# Patient Record
Sex: FEMALE | Race: WHITE | Employment: OTHER
[De-identification: names, ages, dates, MRNs, and addresses within clinical notes are randomized per-mention and may not be internally consistent; named-entity substitution may affect disease eponyms.]

---

## 2017-01-23 RX ORDER — SODIUM CHLORIDE 0.9 % (FLUSH) 0.9 %
5-10 SYRINGE (ML) INJECTION AS NEEDED
Status: CANCELLED | OUTPATIENT
Start: 2017-01-24

## 2017-01-23 RX ORDER — DIAZEPAM 5 MG/1
10 TABLET ORAL ONCE
Status: CANCELLED | OUTPATIENT
Start: 2017-01-24 | End: 2017-01-24

## 2017-01-24 ENCOUNTER — SURGERY (OUTPATIENT)
Age: 77
End: 2017-01-24

## 2017-01-24 ENCOUNTER — HOSPITAL ENCOUNTER (OUTPATIENT)
Age: 77
Setting detail: OUTPATIENT SURGERY
Discharge: HOME | End: 2017-01-24
Attending: PHYSICAL MEDICINE & REHABILITATION | Admitting: PHYSICAL MEDICINE & REHABILITATION
Payer: MEDICARE

## 2017-01-24 ENCOUNTER — APPOINTMENT (OUTPATIENT)
Dept: GENERAL RADIOLOGY | Age: 77
End: 2017-01-24
Attending: PHYSICAL MEDICINE & REHABILITATION
Payer: MEDICARE

## 2017-01-24 VITALS
SYSTOLIC BLOOD PRESSURE: 111 MMHG | DIASTOLIC BLOOD PRESSURE: 58 MMHG | HEART RATE: 76 BPM | BODY MASS INDEX: 24.35 KG/M2 | RESPIRATION RATE: 14 BRPM | OXYGEN SATURATION: 98 % | TEMPERATURE: 98.1 F | WEIGHT: 124 LBS | HEIGHT: 60 IN

## 2017-01-24 LAB — GLUCOSE BLD STRIP.AUTO-MCNC: 112 MG/DL (ref 70–110)

## 2017-01-24 PROCEDURE — 82962 GLUCOSE BLOOD TEST: CPT

## 2017-01-24 PROCEDURE — 76010000009 HC PAIN MGT 0 TO 30 MIN PROC: Performed by: PHYSICAL MEDICINE & REHABILITATION

## 2017-01-24 PROCEDURE — 77030003665 HC NDL SPN BBMI -A: Performed by: PHYSICAL MEDICINE & REHABILITATION

## 2017-01-24 PROCEDURE — 74011250636 HC RX REV CODE- 250/636

## 2017-01-24 PROCEDURE — 74011000250 HC RX REV CODE- 250: Performed by: PHYSICAL MEDICINE & REHABILITATION

## 2017-01-24 PROCEDURE — 74011250636 HC RX REV CODE- 250/636: Performed by: PHYSICAL MEDICINE & REHABILITATION

## 2017-01-24 PROCEDURE — 74011250637 HC RX REV CODE- 250/637: Performed by: PHYSICAL MEDICINE & REHABILITATION

## 2017-01-24 RX ORDER — DIAZEPAM 5 MG/1
5 TABLET ORAL ONCE
Status: COMPLETED | OUTPATIENT
Start: 2017-01-24 | End: 2017-01-24

## 2017-01-24 RX ORDER — LIDOCAINE HYDROCHLORIDE 10 MG/ML
INJECTION, SOLUTION EPIDURAL; INFILTRATION; INTRACAUDAL; PERINEURAL AS NEEDED
Status: DISCONTINUED | OUTPATIENT
Start: 2017-01-24 | End: 2017-01-24 | Stop reason: HOSPADM

## 2017-01-24 RX ORDER — ROPIVACAINE HYDROCHLORIDE 2 MG/ML
INJECTION, SOLUTION EPIDURAL; INFILTRATION; PERINEURAL AS NEEDED
Status: DISCONTINUED | OUTPATIENT
Start: 2017-01-24 | End: 2017-01-24 | Stop reason: HOSPADM

## 2017-01-24 RX ORDER — SODIUM CHLORIDE 0.9 % (FLUSH) 0.9 %
5-10 SYRINGE (ML) INJECTION AS NEEDED
Status: DISCONTINUED | OUTPATIENT
Start: 2017-01-24 | End: 2017-01-24 | Stop reason: HOSPADM

## 2017-01-24 RX ORDER — DIAZEPAM 5 MG/1
10 TABLET ORAL ONCE
Status: DISCONTINUED | OUTPATIENT
Start: 2017-01-24 | End: 2017-01-24

## 2017-01-24 RX ADMIN — DIAZEPAM 5 MG: 5 TABLET ORAL at 10:24

## 2017-01-24 RX ADMIN — LIDOCAINE HYDROCHLORIDE 6 ML: 10 INJECTION, SOLUTION EPIDURAL; INFILTRATION; INTRACAUDAL; PERINEURAL at 11:15

## 2017-01-24 RX ADMIN — ROPIVACAINE HYDROCHLORIDE 6 ML: 2 INJECTION, SOLUTION EPIDURAL; INFILTRATION at 11:16

## 2017-01-24 NOTE — PROCEDURES
[facility]    DIAG LUMBAR FACET INJECTIONS  PROCEDURE REPORT      PATIENT:  Bhupendra Alan OF BIRTH:  1940  DATE OF SERVICE:  1/24/2017  [facility]    PRE-PROCEDURE DIAGNOSIS:  See Above    POST-PROCEDURE DIAGNOSIS:  See Above                PROCEDURE:  1. Bilateral diagnostic lumbar medial branch blocks via the L3/L4,  L4/L5,  L5/S1 medial branch nerves ( 78839, 50;  62055, 50;  40001, 50 )  2. Fluoroscopic needle guidance (35524)      LEVELS TREATED:  Bilateral sided L3/L4,  L4/L5,  L5/S1 medial branch nerves     ANESTHESIA:  See Medication Administration Record    COMPLICATIONS: None. PHYSICIAN:  Christy Eli MD    PRE-PROCEDURE NOTE:  Pre-procedural assessment of the patient was performed including a limited history and physical examination. The details of the procedure were discussed with the patient, including the risks, benefits and alternative options and an informed consent was obtained. The patients NPO status, if necessary for the specific procedure and/or administration of moderate intravenous sedation, if utilized, and availability of a responsible adult to escort the patient following the procedure were confirmed. PROCEDURE NOTE:  The patient was brought to the procedure suite and positioned on the fluoroscopy table in the prone position. Physiologic monitors were applied and supplemental oxygen was administered via nasal cannula. The skin was prepped in the standard surgical fashion and sterile drapes were applied over the procedure site. Please refer to the Flowsheet for documentation of the patients vital signs and the Medication Administration Record for any oral and/or intravenous sedation administered prior to or during the procedure. 1% Lidocaine was utilized for local anesthesia.  Under AP fluoroscopic guidance a 25-gauge, 3-1/2 inch short bevel spinal needle was advanced to the junction of the superior articular process and transverse process of each vertebral level immediately inferior to the above-mentioned dorsal rami medial branch nerves. A needle was also placed along the sacral ala to block the L5 medial branch nerve. After all needles were placed,  0.5 mL of 0.2% ropivacaine was injected at each location after the negative aspiration of blood, air or CSF. The needles were removed and the stilets were replaced. The procedure was performed on the contralateral side in the same fashion and at the same levels using the same volume of local anesthetic following negative aspiration of blood, air or CSF. The needles were removed intact. The area was thoroughly cleaned and sterile bandages applied as necessary. The patient tolerated the procedure well and vital signs remained stable throughout the procedure. The patient was assessed immediately following the procedure and was noted to have greater than 50% reduction in pain (a reduction from 7/10 to 3/10 in severity of lumbar pain). Based on these results, this procedure will be repeated to assess if the patient is an appropriate candidate for radiofrequency ablation of the above-mentioned medial branch nerves. Based on these results, the patient is considered an appropriate candidate for radiofrequency ablation of the above-mentioned medial branch nerves and will be scheduled for that procedure. The total levels successfully blocked were 3 levels, both sides. POST-PROCEDURE COURSE:  The patient was escorted from the procedure suite in satisfactory condition and recovered per facility protocol based on the type of procedure performed and/or the sedation utilized. The patient did not experience any adverse events and remained hemodynamically stable during the post-procedure period.       DISCHARGE NOTE:  Upon discharge, the patient was able to tolerate fluids and was in no acute distress. The patient was oriented to person, place and time and vital signs were stable. Appropriate post-procedure instructions were provided and explained to the patient in detail and all questions were answered.     Kelley Oconnell MD 1/24/2017 11:23 AM

## 2017-01-24 NOTE — H&P
24 Jabn 2017, 10:07AM.  Patient seen and examined prior to procedure. Chart and data reviewed. No significant interval changes from previous evaluation noted. Stable for procedure as intended and discussed.  [facility]

## 2017-01-24 NOTE — PERIOP NOTES
Armband removed and placed in shredder. Patient alert, oriented x 4, denies complaints of pain. Injection site dressing dry and intact. Taken to car by wheelchair with no complications.

## 2017-01-24 NOTE — DISCHARGE INSTRUCTIONS
[facility]      Post Procedures Instructions    *Resume Diet and Activity as tolerated. Rest for the remainder of the day. *You may fell worse before you feel better as the numbing medications wear off before the steroids take effect if used for your procedures. *Do not use affected extremity until numbness or loss of sensation has completely resolved without assistance. *DO NOT DRIVE, operate machinery/heavey equipment for 24 hours. *DO NOT DRINK ALCOHOL for 24 hours as it may interact with the sedation if you received it and also thins your blood and may cause you to bleed. *WAIT 24 hours before starting back ANY Blood thinning medications:   (Heparin, Coumadin, Warfarin, Lovenox, Plavix, Aggrenox)    *Resume Pre-Procedure Medications as prescribed except Blood Thinners unless directed by your Physician or Cardiologist.     *Avoid Hot tubs and Heating pad for 24 hours to prevent dissipation of medications, you may shower to remove bandages and remaining prep residue on the skin. * If you develop a Headache, drink plenty of fluids including beverages with caffeine (Coffee, Mt. Dew etc.) and rest.  If the headache persists longer than 24 hoursor intensifies - Please call [facility] ([facility]) [phone]    * If you are DIABETIC, check your blood sugar three times a day for the next three days, the steroids will increase your blood sugar. If your blood sugar is greater than 400 have someone drive you to the nearest [address]. * If you experience any of the following problems, call the [facility] [phone] between 8:00 am - 4:30pm or After Hours [phone].     Shortness of breath    Fever of 101 F or higher    Nausea / Vomiting (not normal to you)    Increasing stiffness in the neck    Weakness or numbness in the arms or legs that is not resolving    Prolonged and increasing pain > than 4 days    ANYTHING OUT of the ORDINARY TO YOU    If YOU are experiencing a severe reaction / complication that you have never had before post procedure, call 911 or go to the nearest emergency room! All patients must have a  for transportation [place] regardless if you do or do not receive sedation. DISCHARGE SUMMARY from Nurse      PATIENT INSTRUCTIONS:    After Oral  or intravenous sedation, for 24 hours or while taking prescription Narcotics:  · Limit your activities  · Do not drive and operate hazardous machinery  · Do not make important personal or business decisions  · Do  not drink alcoholic beverages  · If you have not urinated within 8 hours after discharge, please contact your surgeon on call. Report the following to your surgeon:  · Excessive pain, swelling, redness or odor of or around the surgical area  · Temperature over 101  · Nausea and vomiting lasting longer than 4 hours or if unable to take medications  · Any signs of decreased circulation or nerve impairment to extremity: change in color, persistent  numbness, tingling, coldness or increase pain  · Any questions        What to do at Home:  Recommended activity: Activity as tolerated, NO DRIVING FOR 24 Hours post injection          *  Please give a list of your current medications to your Primary Care Provider. *  Please update this list whenever your medications are discontinued, doses are      changed, or new medications (including over-the-counter products) are added. *  Please carry medication information at all times in case of emergency situations. These are general instructions for a healthy lifestyle:    No smoking/ No tobacco products/ Avoid exposure to second hand smoke    Surgeon General's Warning:  Quitting smoking now greatly reduces serious risk to your health.     Obesity, smoking, and sedentary lifestyle greatly increases your risk for illness    A healthy diet, regular physical exercise & weight monitoring are important for maintaining a healthy lifestyle    You may be retaining fluid if you have a history of heart failure or if you experience any of the following symptoms:  Weight gain of 3 pounds or more overnight or 5 pounds in a week, increased swelling in our hands or feet or shortness of breath while lying flat in bed. Please call your doctor as soon as you notice any of these symptoms; do not wait until your next office visit. Recognize signs and symptoms of STROKE:    F-face looks uneven    A-arms unable to move or move unevenly    S-speech slurred or non-existent    T-time-call 911 as soon as signs and symptoms begin-DO NOT go       Back to bed or wait to see if you get better-TIME IS BRAIN.

## 2017-01-27 ENCOUNTER — OFFICE VISIT (OUTPATIENT)
Dept: NEUROLOGY | Age: 77
End: 2017-01-27

## 2017-01-27 ENCOUNTER — TELEPHONE (OUTPATIENT)
Dept: PAIN MANAGEMENT | Age: 77
End: 2017-01-27

## 2017-01-27 VITALS
BODY MASS INDEX: 25.48 KG/M2 | SYSTOLIC BLOOD PRESSURE: 118 MMHG | OXYGEN SATURATION: 99 % | DIASTOLIC BLOOD PRESSURE: 58 MMHG | WEIGHT: 129.8 LBS | HEART RATE: 73 BPM | TEMPERATURE: 97.4 F | HEIGHT: 60 IN

## 2017-01-27 DIAGNOSIS — R25.9 MIXED ACTION AND RESTING TREMOR: ICD-10-CM

## 2017-01-27 DIAGNOSIS — G21.4 VASCULAR PARKINSONISM (HCC): Primary | ICD-10-CM

## 2017-01-27 DIAGNOSIS — I67.9 SMALL VESSEL DISEASE, CEREBROVASCULAR: ICD-10-CM

## 2017-01-27 RX ORDER — PRAMIPEXOLE DIHYDROCHLORIDE 0.5 MG/1
TABLET ORAL
Qty: 270 TAB | Refills: 0 | Status: SHIPPED | COMMUNITY
Start: 2017-01-27 | End: 2017-04-07 | Stop reason: SDUPTHER

## 2017-01-27 NOTE — MR AVS SNAPSHOT
Visit Information Date & Time Provider Department Dept. Phone Encounter #  
 1/27/2017  4:00 PM Sanjana Gomez MD [facility] [phone] [id number] Follow-up Instructions Return in about 3 months (around 4/27/2017). Your Appointments 1/31/2017  8:00 AM  
Follow Up with Marycarmen Wilkinson MD  
[facility]) Appt Note: 3 month  back fu  no copay; 3 month back fu no copay [address] [facility] [address]  
[phone]  
  
   
 [address] [facility],[address] [facility]  
  
    
 2/1/2017  2:00 PM  
ROUTINE CARE with Goldy Barton MD  
[facility] ([facility]) Appt Note: routine f/u 2mo; r/s from 11/18 at request of pt; .; DUE FOR SUB AWV  now; LMOV to confirm appt 1/9/2017 kjs; =  
 [address] [facility] [address] [id number] [address] [facility]  
  
    
 4/28/2017  4:00 PM  
Follow Up with Sanjana Gomez MD  
[facility]) Appt Note: 3MON F/U  
 [address] [facility] [address] [facility] [address] [phone]  
  
   
 [facility] [address] Upcoming Health Maintenance Date Due  
 MEDICARE YEARLY EXAM 5/10/2003 INFLUENZA AGE 9 TO ADULT 8/1/2016 HEMOGLOBIN A1C Q6M 1/13/2017 MICROALBUMIN Q1 7/13/2017 LIPID PANEL Q1 7/13/2017 FOOT EXAM Q1 7/14/2017 Pneumococcal 65+ Low/Medium Risk (2 of 2 - PPSV23) 7/20/2017 EYE EXAM RETINAL OR DILATED Q1 10/12/2017 GLAUCOMA SCREENING Q2Y 10/12/2018 DTaP/Tdap/Td series (2 - Td) 7/20/2026 Allergies as of 1/27/2017  Review Complete On: 1/27/2017 By: Sanjana Gomez MD  
  
 Severity Noted Reaction Type Reactions Codeine  06/29/2016    Swelling Codeine  07/01/2016    Other (comments) Severe swelling Feldene [Piroxicam]  06/29/2016    Swelling Severe swelling of hands, face and feet. Current Immunizations  Never Reviewed No immunizations on file. Not reviewed this visit You Were Diagnosed With   
  
 Codes Comments Vascular parkinsonism ([facility]    -  Primary ICD-10-CM: G21.4 ICD-9-CM: 332.0 Mixed action and resting tremor     ICD-10-CM: R25.9 ICD-9-CM: 781.0 Small vessel disease, cerebrovascular     ICD-10-CM: I67.9 ICD-9-CM: 437.9 Vitals BP Pulse Temp Height(growth percentile) Weight(growth percentile) SpO2  
 118/58 73 97.4 °F (36.3 °C) (Oral) 5' (1.524 m) 129 lb 12.8 oz (58.9 kg) 99% BMI OB Status Smoking Status 25.35 kg/m2 Hysterectomy Never Smoker BMI and BSA Data Body Mass Index Body Surface Area  
 25.35 kg/m 2 1.58 m 2 Preferred Pharmacy Pharmacy Name Phone [address] [facility] [phone] Your Updated Medication List  
  
   
This list is accurate as of: 1/27/17  4:25 PM.  Always use your most recent med list.  
  
  
  
  
 alendronate 70 mg tablet Commonly known as:  FOSAMAX Take 1 Tab by mouth every seven (7) days. amLODIPine 10 mg tablet Commonly known as:  Hernandez Cater TAKE 1 TABLET DAILY  
  
 aspirin 81 mg chewable tablet Take 81 mg by mouth daily. cholecalciferol 1,000 unit Cap Commonly known as:  VITAMIN D3 Take 1,000 Units by mouth daily. CRANBERRY PLUS VITAMIN C PO Take 4,000 mg by mouth daily. furosemide 40 mg tablet Commonly known as:  LASIX Take 1 Tab by mouth daily. Takes 40 mg every morning  
  
 gabapentin 300 mg capsule Commonly known as:  NEURONTIN Take 1 Cap by mouth nightly. glucose blood VI test strips strip Commonly known as:  ASCENSIA AUTODISC VI, ONE TOUCH ULTRA TEST VI  
by Does Not Apply route See Admin Instructions. Check 1-2 times a day Lancets Misc Precision X-tra. Check 1-2 times a day  
  
 levothyroxine 100 mcg tablet Commonly known as:  SYNTHROID Take 1 Tab by mouth Daily (before breakfast). magnesium 250 mg Tab Take 250 mg by mouth daily. metFORMIN 1,000 mg tablet Commonly known as:  GLUCOPHAGE  
TAKE 1 TABLET TWICE A DAY WITH MEALS  
  
 metOLazone 5 mg tablet Commonly known as:  ZAROXOLYN  
TAKE 1 TABLET DAILY NexIUM 40 mg capsule Generic drug:  esomeprazole TAKE 1 CAPSULE EVERY MORNING  
  
 omega-3 fatty acids-vitamin e 1,000 mg Cap Take 1 Cap by mouth two (2) times a day. 1,200 mg 2 times daily  
  
 pramipexole 0.5 mg tablet Commonly known as:  MIRAPEX  
1 tab po three times a day  
  
 predniSONE 20 mg tablet Commonly known as:  Jose Felling Take 20 mg by mouth once as needed for Pain (gout). simvastatin 20 mg tablet Commonly known as:  ZOCOR  
TAKE 1 TABLET NIGHTLY  
  
 tiZANidine 4 mg capsule Commonly known as:  Kip Schirmer Take 4 mg by mouth nightly. traMADol 50 mg tablet Commonly known as:  ULTRAM  
Take 50 mg by mouth every eight (8) hours as needed for Pain.  
  
 valsartan 160 mg tablet Commonly known as:  DIOVAN Take 1 Tab by mouth daily. Prescriptions Sent to Mail Order Refills  
 pramipexole (MIRAPEX) 0.5 mg tablet 0 Si tab po three times a day Class: Mail Order Pharmacy: [address], [address] Ph #: [phone] Follow-up Instructions Return in about 3 months (around 2017). Introducing [facility]! Dear Mary Greene: Thank you for requesting a [employer] account. Our records indicate that you already have an active [employer] account. You can access your account anytime at [email or web address] Did you know that you can access your hospital and ER discharge instructions at any time in [employer]?   You can also review all of your test results from your hospital stay or ER visit. Additional Information If you have questions, please visit the Frequently Asked Questions section of the [employer] website at [email or web address] Remember, [employer] is NOT to be used for urgent needs. For medical emergencies, dial 911. Now available from your iPhone and Android! Please provide this summary of care documentation to your next provider. Your primary care clinician is listed as Leda Harrell. If you have any questions after today's visit, please call [phone].

## 2017-01-27 NOTE — PROGRESS NOTES
[facility]             [address] [place] [address] [address], [address]    1/27/2017    HPI:  Romayne Boop is a 64 y.o., right handed,   female, who presents with tremor . The patient reports onset of left hand tremor approximately 12 years ago. The right hand started shaking 4-5 years ago. Noam Garcia been diabetic since 1993. The tremor is worse at with action, but present at rest relieves with holding the hands. She also notes leg weakness and foot numbness, as well as left-sided sciatica, cramping as been relieved with the use of Neurontin. Tremor is still worse on the left. She denies any shuffling gait, although she does note some stiffness slowness to do back pain. She has no history of strokes. She does have a history of hypertension, hypothyroidism, and diabetes  SHe continues to have tremor worse at rest difficulty walking slowness of gait. She did not tolerate the vitamin B2. She returns today to review test results. Patient is walking better she did have an injection and reports her sciatica on the left is much improved initially s      Patient reports doing well except when very nervous or stressed. She is tolerating the Mirapex at 0.5 mg 3 times daily and only taking that dose. She denies any difficulty with the medication. She has fallen once since last visit when she tripped garage in [place].   She states her left knee occasionally soham and so is hesitant in terms of walking  Current Outpatient Prescriptions   Medication Sig Dispense Refill    pramipexole (MIRAPEX) 0.5 mg tablet 1 tab po three times a day 270 Tab 0    metOLazone (ZAROXOLYN) 5 mg tablet TAKE 1 TABLET DAILY 90 Tab 0    amLODIPine (NORVASC) 10 mg tablet TAKE 1 TABLET DAILY 90 Tab 0    simvastatin (ZOCOR) 20 mg tablet TAKE 1 TABLET NIGHTLY 90 Tab 0    NEXIUM 40 mg capsule TAKE 1 CAPSULE EVERY MORNING 90 Cap 0    metFORMIN (GLUCOPHAGE) 1,000 mg tablet TAKE 1 TABLET TWICE A DAY WITH MEALS 180 Tab 1    levothyroxine (SYNTHROID) 100 mcg tablet Take 1 Tab by mouth Daily (before breakfast). 30 Tab 1    magnesium 250 mg tab Take 250 mg by mouth daily.  valsartan (DIOVAN) 160 mg tablet Take 1 Tab by mouth daily. 30 Tab 1    alendronate (FOSAMAX) 70 mg tablet Take 1 Tab by mouth every seven (7) days. 4 Tab 3    predniSONE (DELTASONE) 20 mg tablet Take 20 mg by mouth once as needed for Pain (gout).  gabapentin (NEURONTIN) 300 mg capsule Take 1 Cap by mouth nightly. 30 Cap 0    furosemide (LASIX) 40 mg tablet Take 1 Tab by mouth daily. Takes 40 mg every morning 30 Tab 0    tiZANidine (ZANAFLEX) 4 mg capsule Take 4 mg by mouth nightly.  traMADol (ULTRAM) 50 mg tablet Take 50 mg by mouth every eight (8) hours as needed for Pain.  glucose blood VI test strips (ASCENSIA AUTODISC VI, ONE TOUCH ULTRA TEST VI) strip by Does Not Apply route See Admin Instructions. Check 1-2 times a day 100 Strip 6    Lancets misc Precision X-tra. Check 1-2 times a day 100 Each 6    aspirin 81 mg chewable tablet Take 81 mg by mouth daily.  omega-3 fatty acids-vitamin e 1,000 mg cap Take 1 Cap by mouth two (2) times a day. 1,200 mg 2 times daily      cholecalciferol (VITAMIN D3) 1,000 unit cap Take 1,000 Units by mouth daily.  CRANBERRY CONC/ASCORBIC ACID (CRANBERRY PLUS VITAMIN C PO) Take 4,000 mg by mouth daily.          Past Medical History   Diagnosis Date    Acid reflux     Cancer ([address]      uterine    Diabetes ([address]     Dizzy spells     Essential hypertension     Gallbladder attack     Hearing loss     High cholesterol     Hypertension     Memory loss     Sinus complaint     SOB (shortness of breath)     Thyroid disease        Past Surgical History   Procedure Laterality Date    Hx orthopaedic       5 back surgeries    Hx hysterectomy       partial hysterectomy    Hx orthopaedic       hand arm and left knee    Hx cataract removal      Hx appendectomy      Hx cholecystectomy      Pr neurological procedure unlisted  1993     Compression Fracture Surgery    Pr neurological procedure unlisted  1994    Pr neurological procedure unlisted  1995     Family History   Problem Relation Age of Onset    Cancer Mother 76     Cervical Cancer    Asthma Father     Heart Attack Father     Cancer Maternal Grandmother      colon cancer    Diabetes Maternal Grandmother     Hypertension Maternal Grandmother     Breast Cancer Daughter      two daughters    Diabetes Paternal Grandmother     Heart Disease Paternal Grandmother     Other Maternal Aunt      mental illness due to accident during infancy     Allergies   Allergen Reactions    Codeine Swelling    Codeine Other (comments)     Severe swelling    Feldene [Piroxicam] Swelling     Severe swelling of hands, face and feet. Review of Systems:   Review of Systems - History obtained from the patient  General ROS: negative  Psychological ROS: positive for - anxiety  ENT ROS: negative  Hematological and Lymphatic ROS: negative  Endocrine ROS: negative  Respiratory ROS: no cough, shortness of breath, or wheezing  Cardiovascular ROS: no chest pain or dyspnea on exertion  Gastrointestinal ROS: no abdominal pain, change in bowel habits, or black or bloody stools  Genito-Urinary ROS: no dysuria, trouble voiding, or hematuria  Musculoskeletal ROS: positive for - gait disturbance, joint pain and pain in back - lower  Neurological ROS: positive for - gait disturbance, numbness/tingling, tremors and weakness  Dermatological ROS: negative    PHYSICAL EXAMINATION:    Visit Vitals    /58    Pulse 73    Temp 97.4 °F (36.3 °C) (Oral)    Ht 5' (1.524 m)    Wt 58.9 kg (129 lb 12.8 oz)    SpO2 99%    BMI 25.35 kg/m2     General:  Well defined, nourished, and groomed individual in no acute distress. Neck: Supple, nontender, thyroid within normal limits, no JVD, no bruits, no pain with resistance to active range of motion. Heart: Regular rate and rhythm, no murmurs, rub, or gallop. Normal S1S2. Lungs:  Clear to auscultation bilaterally with equal chest expansion, no cough, no wheeze  Musculoskeletal:  Extremities revealed no edema and had full range of motion of joints. Psych:  Good mood and normal affect    NEUROLOGICAL EXAMINATION:     Mental Status:   Alert and oriented to person, place, and time with recent and remote memory  Fairly intact. Attention span and concentration are normal. Speech is fluent with a full fund of knowledge. Cranial Nerves:    II, III, IV, VI:  Visual acuity grossly intact. Visual fields are normal.    Pupils are equal, round, and reactive to light and accommodation. Extra-ocular movements are full and fluid. no ptosis or nystagmus. V-XII: Hearing is grossly intact. Facial features are symmetric  Motor Examination: Normal tone, bulk, and strength, 5/5 muscle strength throughout except distal ble. No cogwheel rigidity or clonus present. Coordination:  .  Finger to nose and rapid arm movement testing was fairly well done minimal resting worse on left     Gait and Station:  Steady while walking . No muscle wasting or fasiculations noted. Mri brain images reviewed personally  as showing moderately severe changes consistent with small vessel disease    Vit b12 supra therapeutic  Assessment and Plan:   Radha Bond is a 64 y.o. right handed female whose history and physical are consistent with mixed action and resting tremor,bradykinessia. Radha Bond who has risk factors including hypertension,diabetes,probable diabetic neuropathy,small vessl disease    Ish Sanchez was seen today for tremors.     Diagnoses and all orders for this visit:    Vascular parkinsonism ([address]    Mixed action and resting tremor    Small vessel disease, cerebrovascular    Other orders  -     pramipexole (MIRAPEX) 0.5 mg tablet; 1 tab po three times a day      Follow-up Disposition:  Return in about 3 months (around 4/27/2017). Reviewed work up accomplished ,notes from Dr Ambreen Hopper   Reviewed risks and benefits of mirapex I spent 30  minutes with the patient in face-to-face consultation, of which greater than 50% was spent in counseling and coordination of care as described above.

## 2017-01-27 NOTE — TELEPHONE ENCOUNTER
Ms. Teixeira Both was contacted for follow-up status post [address] on January 24, 2017.  She reports:    Pre-procedure numerical pain score: 8/10  Post-procedure numerical pain score immediately after: 0/10  Duration of relief post-procedure (if applicable): 2 days  Improvement in functional activities (if applicable): Yes  Percentage of overall improvement: 100%    COMMENTS:

## 2017-01-31 ENCOUNTER — OFFICE VISIT (OUTPATIENT)
Dept: ORTHOPEDIC SURGERY | Age: 77
End: 2017-01-31

## 2017-01-31 VITALS
HEART RATE: 76 BPM | OXYGEN SATURATION: 99 % | TEMPERATURE: 97.8 F | RESPIRATION RATE: 18 BRPM | BODY MASS INDEX: 25.25 KG/M2 | HEIGHT: 60 IN | SYSTOLIC BLOOD PRESSURE: 110 MMHG | DIASTOLIC BLOOD PRESSURE: 47 MMHG | WEIGHT: 128.6 LBS

## 2017-01-31 DIAGNOSIS — M54.16 LUMBAR RADICULOPATHY: ICD-10-CM

## 2017-01-31 DIAGNOSIS — M47.899 FACET SYNDROME: Primary | ICD-10-CM

## 2017-01-31 DIAGNOSIS — M48.061 LUMBAR STENOSIS: ICD-10-CM

## 2017-01-31 NOTE — MR AVS SNAPSHOT
Visit Information Date & Time Provider Department Dept. Phone Encounter #  
 1/31/2017  8:00 AM Kathy Uribe MD [facility] [phone] [id number] Follow-up Instructions Return in about 6 weeks (around 3/14/2017), or if symptoms worsen or fail to improve. Your Appointments 2/1/2017  2:00 PM  
ROUTINE CARE with Gregoria Subramanian MD  
[facility] ([address]) Appt Note: routine f/u 2mo; r/s from 11/18 at request of pt; .; DUE FOR SUB AWV  now; LMOV to confirm appt 1/9/2017 kjs; =  
 [address] [facility] [address] [place] [address] [address] [id number] [address] [address]  
  
    
 3/14/2017  3:45 PM  
Follow Up with Kathy Uribe MD  
[address] [facility] [facility] [address]) Appt Note: 6 WK BACK FU  NO COPAY  
 [address] [facility] [address]  
[phone]  
  
   
 [address] [facility],[address] [facility]  
  
    
 4/28/2017  4:00 PM  
Follow Up with Vipin Huff MD  
[facility] [address] [facility]) Appt Note: 3MON F/U  
 [address] [facility] [address] [facility] [id number] [phone]  
  
   
 [facility] [id number] Upcoming Health Maintenance Date Due  
 MEDICARE YEARLY EXAM 5/10/2003 INFLUENZA AGE 9 TO ADULT 8/1/2016 HEMOGLOBIN A1C Q6M 1/13/2017 MICROALBUMIN Q1 7/13/2017 LIPID PANEL Q1 7/13/2017 FOOT EXAM Q1 7/14/2017 Pneumococcal 65+ Low/Medium Risk (2 of 2 - PPSV23) 7/20/2017 EYE EXAM RETINAL OR DILATED Q1 10/12/2017 GLAUCOMA SCREENING Q2Y 10/12/2018 DTaP/Tdap/Td series (2 - Td) 7/20/2026 Allergies as of 1/31/2017  Review Complete On: 1/31/2017 By: Patrick Fisher LPN Severity Noted Reaction Type Reactions Codeine  06/29/2016    Swelling Codeine  07/01/2016    Other (comments) Severe swelling Feldene [Piroxicam]  06/29/2016    Swelling Severe swelling of hands, face and feet. Current Immunizations  Never Reviewed No immunizations on file. Not reviewed this visit You Were Diagnosed With   
  
 Codes Comments Facet syndrome    -  Primary ICD-10-CM: M12.88 ICD-9-CM: 724.8 Lumbar stenosis     ICD-10-CM: M48.06 
ICD-9-CM: 724.02 Lumbar radiculopathy     ICD-10-CM: M54.16 
ICD-9-CM: 724.4 Vitals BP Pulse Temp Resp Height(growth percentile) Weight(growth percentile) 110/47 76 97.8 °F (36.6 °C) (Oral) 18 5' (1.524 m) 128 lb 9.6 oz (58.3 kg) SpO2 BMI OB Status Smoking Status 99% 25.12 kg/m2 Hysterectomy Never Smoker BMI and BSA Data Body Mass Index Body Surface Area  
 25.12 kg/m 2 1.57 m 2 [phone] Your Updated Medication List  
  
   
This list is accurate as of: 1/31/17  8:48 AM.  Always use your most recent med list.  
  
  
  
  
 alendronate 70 mg tablet Commonly known as:  FOSAMAX Take 1 Tab by mouth every seven (7) days. amLODIPine 10 mg tablet Commonly known as:  Niurka Colon TAKE 1 TABLET DAILY  
  
 aspirin 81 mg chewable tablet Take 81 mg by mouth daily. cholecalciferol 1,000 unit Cap Commonly known as:  VITAMIN D3 Take 1,000 Units by mouth daily. CRANBERRY PLUS VITAMIN C PO Take 4,000 mg by mouth daily. furosemide 40 mg tablet Commonly known as:  LASIX Take 1 Tab by mouth daily. Takes 40 mg every morning  
  
 gabapentin 300 mg capsule Commonly known as:  NEURONTIN Take 1 Cap by mouth nightly. glucose blood VI test strips strip Commonly known as:  ASCENSIA AUTODISC VI, ONE TOUCH ULTRA TEST VI  
by Does Not Apply route See Admin Instructions. Check 1-2 times a day Lancets Misc Precision X-tra. Check 1-2 times a day levothyroxine 100 mcg tablet Commonly known as:  SYNTHROID Take 1 Tab by mouth Daily (before breakfast). magnesium 250 mg Tab Take 250 mg by mouth daily. metFORMIN 1,000 mg tablet Commonly known as:  GLUCOPHAGE  
TAKE 1 TABLET TWICE A DAY WITH MEALS  
  
 metOLazone 5 mg tablet Commonly known as:  ZAROXOLYN  
TAKE 1 TABLET DAILY NexIUM 40 mg capsule Generic drug:  esomeprazole TAKE 1 CAPSULE EVERY MORNING  
  
 omega-3 fatty acids-vitamin e 1,000 mg Cap Take 1 Cap by mouth two (2) times a day. 1,200 mg 2 times daily  
  
 pramipexole 0.5 mg tablet Commonly known as:  MIRAPEX  
1 tab po three times a day  
  
 predniSONE 20 mg tablet Commonly known as:  Devendra Lever Take 20 mg by mouth once as needed for Pain (gout). simvastatin 20 mg tablet Commonly known as:  ZOCOR  
TAKE 1 TABLET NIGHTLY  
  
 tiZANidine 4 mg capsule Commonly known as:  Pita Dilma Take 4 mg by mouth nightly. traMADol 50 mg tablet Commonly known as:  ULTRAM  
Take 50 mg by mouth every eight (8) hours as needed for Pain.  
  
 valsartan 160 mg tablet Commonly known as:  DIOVAN Take 1 Tab by mouth daily. Follow-up Instructions Return in about 6 weeks (around 3/14/2017), or if symptoms worsen or fail to improve. Patient Instructions Learning About Medial Branch Block and Neurotomy What are medial branch block and neurotomy? Facet joints connect your vertebrae to each other. Problems in these joints can cause chronic (long-term) pain in the neck or back. They can sometimes affect the shoulders, arms, buttocks, or legs. Medial branch nerves are the nerves that carry many of the pain messages from your facet joints. Radiofrequency medial branch neurotomy is a type of medial branch neurotomy that is used to relieve arthritis pain. It uses radio waves to damage nerves in your neck or back so that they can no longer send pain messages to your brain. Before your doctor knows if a neurotomy will help you, he or she will do a medial branch block to find out if certain nerves are the ones that are a source of your pain. You will need two separate visits to the outpatient center or hospital to have both procedures. How is a medial branch block done? The doctor will use a tiny needle to numb the skin where you will get the block. Then he or she puts the block needle into the numbed area. You may feel some pressure, but you should not feel pain. Using fluoroscopy (live X-ray) to guide the needle, the doctor injects medicine onto one or more nerves to make them numb. If you get relief from your pain in the next 4 to 6 hours, it's a sign that those nerves may be contributing to your pain. The relief will last only a short time. You may then have a medial branch neurotomy at a later visit to try to get longer relief. It takes 20 to 30 minutes to get the block. You can go home after the doctor watches you for about an hour. You will get instructions on how to report how much pain you have when you are at home. You will need someone to drive you home. How is medial branch neurotomy done? The doctor will use a tiny needle to numb the skin where you will get the neurotomy. Then he or she puts the neurotomy needle into the numbed area. You may feel some pressure. Using fluoroscopy (live X-ray) to guide the needle, the doctor sends radio waves through the needle to the nerve for 60 to 90 seconds. The radio waves heat the nerve, which damages it. The doctor may do this several times. And he or she may treat more than one nerve. It takes 45 to 90 minutes to get a neurotomy, depending on how many nerves are heated. You will probably go home 30 to 60 minutes later. You will need someone to drive you home. What can you expect after a neurotomy?  
You may feel a little sore or tender at the injection site at first. But after a successful neurotomy, most people have pain relief right away. It often lasts for 9 to 12 months or longer. Sometimes the pain relief is permanent. If your pain does come back, it may mean that the damaged nerve has healed and can send pain messages again. Or it can mean that a different nerve is causing pain. Your doctor will discuss your options with you. Follow-up care is a key part of your treatment and safety. Be sure to make and go to all appointments, and call your doctor if you are having problems. It's also a good idea to know your test results and keep a list of the medicines you take. Where can you learn more? Go to http://nathalia-kam.info/. Enter W246 in the search box to learn more about \"Learning About Medial Branch Block and Neurotomy. \" Current as of: February 19, 2016 Content Version: 11.1 © 9395-4440 [employer]. Care instructions adapted under license by [employer] (which disclaims liability or warranty for this information). If you have questions about a medical condition or this instruction, always ask your healthcare professional. [address] any warranty or liability for your use of this information. Introducing [facility]! Dear Kat Feliciano: Thank you for requesting a [employer] account. Our records indicate that you already have an active [employer] account. You can access your account anytime at [email or web address] Did you know that you can access your hospital and ER discharge instructions at any time in TheFamily? You can also review all of your test results from your hospital stay or ER visit. Additional Information If you have questions, please visit the Frequently Asked Questions section of the [employer] website at [email or web address] Remember, TheFamily is NOT to be used for urgent needs. For medical emergencies, dial 911. Now available from your iPhone and Android! Please provide this summary of care documentation to your next provider. Your primary care clinician is listed as Ariel Isidro. If you have any questions after today's visit, please call [phone].

## 2017-01-31 NOTE — PATIENT INSTRUCTIONS
Learning About Medial Branch Block and Neurotomy  What are medial branch block and neurotomy? Facet joints connect your vertebrae to each other. Problems in these joints can cause chronic (long-term) pain in the neck or back. They can sometimes affect the shoulders, arms, buttocks, or legs. Medial branch nerves are the nerves that carry many of the pain messages from your facet joints. Radiofrequency medial branch neurotomy is a type of medial branch neurotomy that is used to relieve arthritis pain. It uses radio waves to damage nerves in your neck or back so that they can no longer send pain messages to your brain. Before your doctor knows if a neurotomy will help you, he or she will do a medial branch block to find out if certain nerves are the ones that are a source of your pain. You will need two separate visits to the outpatient center or hospital to have both procedures. How is a medial branch block done? The doctor will use a tiny needle to numb the skin where you will get the block. Then he or she puts the block needle into the numbed area. You may feel some pressure, but you should not feel pain. Using fluoroscopy (live X-ray) to guide the needle, the doctor injects medicine onto one or more nerves to make them numb. If you get relief from your pain in the next 4 to 6 hours, it's a sign that those nerves may be contributing to your pain. The relief will last only a short time. You may then have a medial branch neurotomy at a later visit to try to get longer relief. It takes 20 to 30 minutes to get the block. You can go home after the doctor watches you for about an hour. You will get instructions on how to report how much pain you have when you are at home. You will need someone to drive you home. How is medial branch neurotomy done? The doctor will use a tiny needle to numb the skin where you will get the neurotomy. Then he or she puts the neurotomy needle into the numbed area.  You may feel some pressure. Using fluoroscopy (live X-ray) to guide the needle, the doctor sends radio waves through the needle to the nerve for 60 to 90 seconds. The radio waves heat the nerve, which damages it. The doctor may do this several times. And he or she may treat more than one nerve. It takes 45 to 90 minutes to get a neurotomy, depending on how many nerves are heated. You will probably go home 30 to 60 minutes later. You will need someone to drive you home. What can you expect after a neurotomy? You may feel a little sore or tender at the injection site at first. But after a successful neurotomy, most people have pain relief right away. It often lasts for 9 to 12 months or longer. Sometimes the pain relief is permanent. If your pain does come back, it may mean that the damaged nerve has healed and can send pain messages again. Or it can mean that a different nerve is causing pain. Your doctor will discuss your options with you. Follow-up care is a key part of your treatment and safety. Be sure to make and go to all appointments, and call your doctor if you are having problems. It's also a good idea to know your test results and keep a list of the medicines you take. Where can you learn more? Go to http://nathalia-kam.info/. Enter S669 in the search box to learn more about \"Learning About Medial Branch Block and Neurotomy. \"  Current as of: February 19, 2016  Content Version: 11.1  © 2681-7590 [employer]. Care instructions adapted under license by [employer] (which disclaims liability or warranty for this information). If you have questions about a medical condition or this instruction, always ask your healthcare professional. [place] [address] any warranty or liability for your use of this information.
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
## 2017-02-01 ENCOUNTER — OFFICE VISIT (OUTPATIENT)
Dept: FAMILY MEDICINE CLINIC | Age: 77
End: 2017-02-01

## 2017-02-01 VITALS
RESPIRATION RATE: 16 BRPM | OXYGEN SATURATION: 99 % | DIASTOLIC BLOOD PRESSURE: 50 MMHG | HEART RATE: 77 BPM | BODY MASS INDEX: 25.13 KG/M2 | WEIGHT: 128 LBS | HEIGHT: 60 IN | TEMPERATURE: 98.5 F | SYSTOLIC BLOOD PRESSURE: 120 MMHG

## 2017-02-01 DIAGNOSIS — G89.29 CHRONIC BILATERAL LOW BACK PAIN WITHOUT SCIATICA: ICD-10-CM

## 2017-02-01 DIAGNOSIS — E03.9 HYPOTHYROIDISM, UNSPECIFIED TYPE: ICD-10-CM

## 2017-02-01 DIAGNOSIS — K21.9 GASTROESOPHAGEAL REFLUX DISEASE WITHOUT ESOPHAGITIS: ICD-10-CM

## 2017-02-01 DIAGNOSIS — I10 ESSENTIAL HYPERTENSION WITH GOAL BLOOD PRESSURE LESS THAN 130/80: ICD-10-CM

## 2017-02-01 DIAGNOSIS — Z13.39 SCREENING FOR ALCOHOLISM: ICD-10-CM

## 2017-02-01 DIAGNOSIS — E11.9 CONTROLLED TYPE 2 DIABETES MELLITUS WITHOUT COMPLICATION, WITHOUT LONG-TERM CURRENT USE OF INSULIN (HCC): ICD-10-CM

## 2017-02-01 DIAGNOSIS — M54.50 CHRONIC BILATERAL LOW BACK PAIN WITHOUT SCIATICA: ICD-10-CM

## 2017-02-01 DIAGNOSIS — R53.82 CHRONIC FATIGUE: ICD-10-CM

## 2017-02-01 DIAGNOSIS — M79.89 LEG SWELLING: ICD-10-CM

## 2017-02-01 DIAGNOSIS — Z00.00 ROUTINE GENERAL MEDICAL EXAMINATION AT A HEALTH CARE FACILITY: Primary | ICD-10-CM

## 2017-02-01 PROBLEM — Z71.89 ADVANCE DIRECTIVE DISCUSSED WITH PATIENT: Status: ACTIVE | Noted: 2017-02-01

## 2017-02-01 LAB — HBA1C MFR BLD HPLC: 5.9 %

## 2017-02-01 RX ORDER — ESOMEPRAZOLE MAGNESIUM 40 MG/1
40 CAPSULE, DELAYED RELEASE ORAL DAILY
Qty: 90 CAP | Refills: 0 | Status: SHIPPED | OUTPATIENT
Start: 2017-02-01 | End: 2017-05-06 | Stop reason: SDUPTHER

## 2017-02-01 NOTE — MR AVS SNAPSHOT
Visit Information Date & Time Provider Department Dept. Phone Encounter #  
 2/1/2017  2:00 PM Jax Christopher, [facility] [phone] [id number] Your Appointments 3/14/2017  3:45 PM  
Follow Up with Dwain Gupta MD  
[address] [facility], [address] and [facility]) Appt Note: 6 WK BACK FU  NO COPAY  
 [address] [facility] [address]  
[phone]  
  
   
 [address] [facility],[address]  
  
    
 4/28/2017  4:00 PM  
Follow Up with Liz Rocha MD  
[address] [facility]) Appt Note: 3MON F/U  
 [address] [facility] [address]  
[phone]  
  
   
 [facility] [address] Upcoming Health Maintenance Date Due  
 MEDICARE YEARLY EXAM 5/10/2003 HEMOGLOBIN A1C Q6M 1/13/2017 MICROALBUMIN Q1 7/13/2017 LIPID PANEL Q1 7/13/2017 FOOT EXAM Q1 7/14/2017 Pneumococcal 65+ Low/Medium Risk (2 of 2 - PPSV23) 7/20/2017 EYE EXAM RETINAL OR DILATED Q1 10/12/2017 GLAUCOMA SCREENING Q2Y 10/12/2018 DTaP/Tdap/Td series (2 - Td) 7/20/2026 Allergies as of 2/1/2017  Review Complete On: 2/1/2017 By: Jax Christopher MD  
  
 Severity Noted Reaction Type Reactions Codeine  06/29/2016    Swelling Codeine  07/01/2016    Other (comments) Severe swelling Feldene [Piroxicam]  06/29/2016    Swelling Severe swelling of hands, face and feet. Current Immunizations  Never Reviewed Name Date Influenza Vaccine 8/1/2016 Not reviewed this visit You Were Diagnosed With   
  
 Codes Comments Controlled type 2 diabetes mellitus without complication, without long-term current use of insulin ([facility]    -  Primary ICD-10-CM: E11.9 ICD-9-CM: 250.00 Routine general medical examination at a health care facility     ICD-10-CM: Z00.00 ICD-9-CM: V70.0 Screening for alcoholism     ICD-10-CM: Z13.89 ICD-9-CM: V79.1 Hypothyroidism, unspecified type     ICD-10-CM: E03.9 ICD-9-CM: 244.9 Essential hypertension with goal blood pressure less than 130/80     ICD-10-CM: I10 
ICD-9-CM: 401.9 Leg swelling     ICD-10-CM: M79.89 ICD-9-CM: 729.81 Chronic bilateral low back pain without sciatica     ICD-10-CM: M54.5, G89.29 ICD-9-CM: 724.2, 338.29 Low TSH level     ICD-10-CM: R94.6 ICD-9-CM: 794.5 Gastroesophageal reflux disease without esophagitis     ICD-10-CM: K21.9 ICD-9-CM: 530.81 Chronic fatigue     ICD-10-CM: R53.82 
ICD-9-CM: 780.79 Vitals BP Pulse Temp Resp Height(growth percentile) Weight(growth percentile) 120/50 (BP 1 Location: Left arm, BP Patient Position: Sitting) 77 98.5 °F (36.9 °C) (Oral) 16 5' (1.524 m) 128 lb (58.1 kg) SpO2 BMI OB Status Smoking Status 99% 25 kg/m2 Hysterectomy Never Smoker Vitals History BMI and BSA Data Body Mass Index Body Surface Area  
 25 kg/m 2 1.57 m 2 Preferred Pharmacy Pharmacy Name Phone [address] [facility] [phone] Your Updated Medication List  
  
   
This list is accurate as of: 2/1/17  3:58 PM.  Always use your most recent med list.  
  
  
  
  
 alendronate 70 mg tablet Commonly known as:  FOSAMAX Take 1 Tab by mouth every seven (7) days. amLODIPine 10 mg tablet Commonly known as:  Huang Hilario TAKE 1 TABLET DAILY  
  
 aspirin 81 mg chewable tablet Take 81 mg by mouth daily. cholecalciferol 1,000 unit Cap Commonly known as:  VITAMIN D3 Take 1,000 Units by mouth daily. CRANBERRY PLUS VITAMIN C PO Take 4,000 mg by mouth daily. esomeprazole 40 mg capsule Commonly known as:  NexIUM Take 1 Cap by mouth daily. furosemide 40 mg tablet Commonly known as:  LASIX Take 1 Tab by mouth daily. Takes 40 mg every morning gabapentin 300 mg capsule Commonly known as:  NEURONTIN Take 1 Cap by mouth nightly. glucose blood VI test strips strip Commonly known as:  ASCENSIA AUTODISC VI, ONE TOUCH ULTRA TEST VI  
by Does Not Apply route See Admin Instructions. Check 1-2 times a day Lancets Misc Precision X-tra. Check 1-2 times a day  
  
 levothyroxine 100 mcg tablet Commonly known as:  SYNTHROID Take 1 Tab by mouth Daily (before breakfast). magnesium 250 mg Tab Take 250 mg by mouth daily. metFORMIN 1,000 mg tablet Commonly known as:  GLUCOPHAGE  
TAKE 1 TABLET TWICE A DAY WITH MEALS  
  
 metOLazone 5 mg tablet Commonly known as:  ZAROXOLYN  
TAKE 1 TABLET DAILY  
  
 omega-3 fatty acids-vitamin e 1,000 mg Cap Take 1 Cap by mouth two (2) times a day. 1,200 mg 2 times daily  
  
 pramipexole 0.5 mg tablet Commonly known as:  MIRAPEX  
1 tab po three times a day  
  
 predniSONE 20 mg tablet Commonly known as:  Jonetta Moment Take 20 mg by mouth once as needed for Pain (gout). simvastatin 20 mg tablet Commonly known as:  ZOCOR  
TAKE 1 TABLET NIGHTLY  
  
 tiZANidine 4 mg capsule Commonly known as:  Barbara Ord Take 4 mg by mouth nightly. traMADol 50 mg tablet Commonly known as:  ULTRAM  
Take 50 mg by mouth every eight (8) hours as needed for Pain.  
  
 valsartan 160 mg tablet Commonly known as:  DIOVAN Take 1 Tab by mouth daily. Prescriptions Sent to Pharmacy Refills  
 esomeprazole (NEXIUM) 40 mg capsule 0 Sig: Take 1 Cap by mouth daily. Class: Normal  
 Pharmacy: [address], [address] [place] [address] Ph #: [phone] Route: Oral  
  
We Performed the Following AMB POC HEMOGLOBIN A1C [82811 CPT(R)] Patient Instructions Medicare Part B Preventive Services Limitations Recommendation Scheduled Bone Mass Measurement 
(age 72 & older, biennial) Requires diagnosis related to osteoporosis or estrogen deficiency. Biennial benefit unless patient has history of long-term glucocorticoid tx or baseline is needed because initial test was by other method Had it in 2016 before left prior town  per patient. Cardiovascular Screening Blood Tests (every 5 years) Total cholesterol, HDL, Triglycerides Order as a panel if possible Done in July 2016 Colorectal Cancer Screening 
-Fecal occult blood test (annual) -Flexible sigmoidoscopy (5y) 
-Screening colonoscopy (10y) -Barium Enema  NA Counseling to Prevent Tobacco Use (up to 8 sessions per year) - Counseling greater than 3 and up to 10 minutes - Counseling greater than 10 minutes Patients must be asymptomatic of tobacco-related conditions to receive as preventive service N/a Diabetes Screening Tests (at least every 3 years, Medicare covers annually or at 6-month intervals for prediabetic patients) Fasting blood sugar (FBS) or glucose tolerance test (GTT) Patient must be diagnosed with one of the following: 
-Hypertension, Dyslipidemia, obesity, previous impaired FBS or GTT 
Or any two of the following: overweight, FH of diabetes, age ? 72, history of gestational diabetes, birth of baby weighing more than 9 pounds  Due Diabetes Self-Management Training (DSMT) (no USPSTF recommendation) Requires referral by treating physician for patient with diabetes or renal disease. 10 hours of initial DSMT session of no less than 30 minutes each in a continuous 12-month period. 2 hours of follow-up DSMT in subsequent years. N/a Glaucoma Screening (no USPSTF recommendation) Diabetes mellitus, family history, , age 48 or over,  American, age 72 or over Up to date 9/2016 Due  
9/2017 Human Immunodeficiency Virus (HIV) Screening (annually for increased risk patients) HIV-1 and HIV-2 by EIA, AUDREY, rapid antibody test, or oral mucosa transudate Patient must be at increased risk for HIV infection per USPSTF guidelines or pregnant. Tests covered annually for patients at increased risk. Pregnant patients may receive up to 3 test during pregnancy. N/a Medical Nutrition Therapy (MNT) (for diabetes or renal disease not recommended schedule) Requires referral by treating physician for patient with diabetes or renal disease. Can be provided in same year as diabetes self-management training (DSMT), and CMS recommends medical nutrition therapy take place after DSMT. Up to 3 hours for initial year and 2 hours in subsequent years. N/a Shingles Vaccination A shingles vaccine is also recommended once in a lifetime after age 61 Up to date per pt Seasonal Influenza Vaccination (annually)  Up to date  Yearly Pneumococcal Vaccination (once after 65)  Up to date Hepatitis B Vaccinations (if medium/high risk) Medium/high risk factors:  End-stage renal disease, Hemophiliacs who received Factor VIII or IX concentrates, Clients of institutions for the mentally retarded, Persons who live in the same house as a HepB virus carrier, Homosexual men, Illicit injectable drug abusers. NA Screening Mammography (biennial age 54-69) Annually (age 36 or over) Per patient had it in may 2016. Yearly Screening Pap Tests and Pelvic Examination (up to age 79 and after 79 if unknown history or abnormal study last 10 years) Every 24 months except high risk N/a Ultrasound Screening for Abdominal Aortic Aneurysm (AAA) (once) Patient must be referred through IPPE and not have had a screening for abdominal aortic aneurysm before under Medicare. Limited to patients who meet one of the following criteria: 
- Men who are 73-68 years old and have smoked more than 100 cigarettes in their lifetime. 
-Anyone with a FH of AAA 
-Anyone recommended for screening by USPSTF N/a Introducing [facility]! Dear Mohit Prom: Thank you for requesting a [employer] account.   Our records indicate that you already have an active [employer] account. You can access your account anytime at [email or web address] Did you know that you can access your hospital and ER discharge instructions at any time in [employer]? You can also review all of your test results from your hospital stay or ER visit. Additional Information If you have questions, please visit the Frequently Asked Questions section of the [employer] website at [email or web address] Remember, [employer]ustr is NOT to be used for urgent needs. For medical emergencies, dial 911. Now available from your iPhone and Android! Please provide this summary of care documentation to your next provider. Your primary care clinician is listed as Magdalena Echevarria. If you have any questions after today's visit, please call [phone].

## 2017-02-01 NOTE — PATIENT INSTRUCTIONS
Medicare Part B Preventive Services Limitations Recommendation Scheduled   Bone Mass Measurement  (age 72 & older, biennial) Requires diagnosis related to osteoporosis or estrogen deficiency. Biennial benefit unless patient has history of long-term glucocorticoid tx or baseline is needed because initial test was by other method Had it in 2016 before left prior town  per patient. Cardiovascular Screening Blood Tests (every 5 years)  Total cholesterol, HDL, Triglycerides Order as a panel if possible Done in July 2016       Colorectal Cancer Screening  -Fecal occult blood test (annual)  -Flexible sigmoidoscopy (5y)  -Screening colonoscopy (10y)  -Barium Enema  NA     Counseling to Prevent Tobacco Use (up to 8 sessions per year)  - Counseling greater than 3 and up to 10 minutes  - Counseling greater than 10 minutes Patients must be asymptomatic of tobacco-related conditions to receive as preventive service N/a      Diabetes Screening Tests (at least every 3 years, Medicare covers annually or at 6-month intervals for prediabetic patients)    Fasting blood sugar (FBS) or glucose tolerance test (GTT) Patient must be diagnosed with one of the following:  -Hypertension, Dyslipidemia, obesity, previous impaired FBS or GTT  Or any two of the following: overweight, FH of diabetes, age ? 72, history of gestational diabetes, birth of baby weighing more than 9 pounds  Due   Diabetes Self-Management Training (DSMT) (no USPSTF recommendation) Requires referral by treating physician for patient with diabetes or renal disease. 10 hours of initial DSMT session of no less than 30 minutes each in a continuous 12-month period. 2 hours of follow-up DSMT in subsequent years.  N/a      Glaucoma Screening (no USPSTF recommendation) Diabetes mellitus, family history, , age 48 or over,  American, age 72 or over Up to date  9/2016 Due   9/2017   Human Immunodeficiency Virus (HIV) Screening (annually for increased risk patients)  HIV-1 and HIV-2 by EIA, AUDREY, rapid antibody test, or oral mucosa transudate Patient must be at increased risk for HIV infection per USPSTF guidelines or pregnant. Tests covered annually for patients at increased risk. Pregnant patients may receive up to 3 test during pregnancy. N/a      Medical Nutrition Therapy (MNT) (for diabetes or renal disease not recommended schedule) Requires referral by treating physician for patient with diabetes or renal disease. Can be provided in same year as diabetes self-management training (DSMT), and CMS recommends medical nutrition therapy take place after DSMT. Up to 3 hours for initial year and 2 hours in subsequent years. N/a      Shingles Vaccination A shingles vaccine is also recommended once in a lifetime after age 61 Up to date per pt     Seasonal Influenza Vaccination (annually)  Up to date  Yearly    Pneumococcal Vaccination (once after 72)  Up to date     Hepatitis B Vaccinations (if medium/high risk) Medium/high risk factors:  End-stage renal disease,  Hemophiliacs who received Factor VIII or IX concentrates, Clients of institutions for the mentally retarded, Persons who live in the same house as a HepB virus carrier, Homosexual men, Illicit injectable drug abusers. NA    Screening Mammography (biennial age 54-69) Annually (age 36 or over) Per patient had it in may 2016. Yearly    Screening Pap Tests and Pelvic Examination (up to age 79 and after 79 if unknown history or abnormal study last 10 years) Every 24 months except high risk N/a      Ultrasound Screening for Abdominal Aortic Aneurysm (AAA) (once) Patient must be referred through IPPE and not have had a screening for abdominal aortic aneurysm before under Medicare.   Limited to patients who meet one of the following criteria:  - Men who are 73-68 years old and have smoked more than 100 cigarettes in their lifetime.  -Anyone with a FH of AAA  -Anyone recommended for screening by USPSTF N/a

## 2017-02-01 NOTE — PROGRESS NOTES
This is a Subsequent Medicare Annual Wellness Visit providing Personalized Prevention Plan Services (PPPS) (Performed 12 months after initial AWV and PPPS )    I have reviewed the patient's medical history in detail and updated the computerized patient record. History     Past Medical History   Diagnosis Date    Acid reflux     Cancer ([facility] [address]      uterine    Diabetes ([facility] [address]     Dizzy spells     Essential hypertension     Gallbladder attack     Hearing loss     High cholesterol     Hypertension     Memory loss     Sinus complaint     SOB (shortness of breath)     Thyroid disease       Past Surgical History   Procedure Laterality Date    Hx orthopaedic       5 back surgeries    Hx hysterectomy       partial hysterectomy    Hx orthopaedic       hand arm and left knee    Hx cataract removal      Hx appendectomy      Hx cholecystectomy      Pr neurological procedure unlisted  1993     Compression Fracture Surgery    Pr neurological procedure unlisted  1994    Pr neurological procedure unlisted  1995     Current Outpatient Prescriptions   Medication Sig Dispense Refill    pramipexole (MIRAPEX) 0.5 mg tablet 1 tab po three times a day 270 Tab 0    metOLazone (ZAROXOLYN) 5 mg tablet TAKE 1 TABLET DAILY 90 Tab 0    amLODIPine (NORVASC) 10 mg tablet TAKE 1 TABLET DAILY 90 Tab 0    simvastatin (ZOCOR) 20 mg tablet TAKE 1 TABLET NIGHTLY 90 Tab 0    NEXIUM 40 mg capsule TAKE 1 CAPSULE EVERY MORNING 90 Cap 0    metFORMIN (GLUCOPHAGE) 1,000 mg tablet TAKE 1 TABLET TWICE A DAY WITH MEALS 180 Tab 1    levothyroxine (SYNTHROID) 100 mcg tablet Take 1 Tab by mouth Daily (before breakfast). 30 Tab 1    magnesium 250 mg tab Take 250 mg by mouth daily.  valsartan (DIOVAN) 160 mg tablet Take 1 Tab by mouth daily. 30 Tab 1    alendronate (FOSAMAX) 70 mg tablet Take 1 Tab by mouth every seven (7) days. 4 Tab 3    predniSONE (DELTASONE) 20 mg tablet Take 20 mg by mouth once as needed for Pain (gout).  gabapentin (NEURONTIN) 300 mg capsule Take 1 Cap by mouth nightly. 30 Cap 0    furosemide (LASIX) 40 mg tablet Take 1 Tab by mouth daily. Takes 40 mg every morning 30 Tab 0    tiZANidine (ZANAFLEX) 4 mg capsule Take 4 mg by mouth nightly.  traMADol (ULTRAM) 50 mg tablet Take 50 mg by mouth every eight (8) hours as needed for Pain.  glucose blood VI test strips (ASCENSIA AUTODISC VI, ONE TOUCH ULTRA TEST VI) strip by Does Not Apply route See Admin Instructions. Check 1-2 times a day 100 Strip 6    Lancets misc Precision X-tra. Check 1-2 times a day 100 Each 6    aspirin 81 mg chewable tablet Take 81 mg by mouth daily.  omega-3 fatty acids-vitamin e 1,000 mg cap Take 1 Cap by mouth two (2) times a day. 1,200 mg 2 times daily      cholecalciferol (VITAMIN D3) 1,000 unit cap Take 1,000 Units by mouth daily.  CRANBERRY CONC/ASCORBIC ACID (CRANBERRY PLUS VITAMIN C PO) Take 4,000 mg by mouth daily. Allergies   Allergen Reactions    Codeine Swelling    Codeine Other (comments)     Severe swelling    Feldene [Piroxicam] Swelling     Severe swelling of hands, face and feet.      Family History   Problem Relation Age of Onset    Cancer Mother 76     Cervical Cancer    Asthma Father     Heart Attack Father     Cancer Maternal Grandmother      colon cancer    Diabetes Maternal Grandmother     Hypertension Maternal Grandmother     Breast Cancer Daughter      two daughters    Diabetes Paternal Grandmother     Heart Disease Paternal Grandmother     Other Maternal Aunt      mental illness due to accident during infancy     Social History   Substance Use Topics    Smoking status: Never Smoker    Smokeless tobacco: Never Used    Alcohol use No     Patient Active Problem List   Diagnosis Code    Diabetic mononeuropathy associated with diabetes mellitus due to underlying condition ([facility] [address] E08.41    Hypothyroidism E03.9    Leg swelling M79.89    Essential hypertension with goal blood pressure less than 130/80 I10    Diabetes mellitus type 2, controlled (HCC) E11.9    H/O iron deficiency anemia Z86.2    Cramp of both lower extremities R25.2    Chronic bilateral low back pain without sciatica/ h/o prior back surgery M54.5, G89.29    Tremor of both hands R25.1    H/O osteopenia/ per patient. started medication due to high fracture risk  Z87.39    Low TSH level/ recheck labs/ pt wants to wait till next labs before do referral to endocrinologist  R94.6    Spondylosis of lumbar region without myelopathy or radiculopathy M47.816    Lumbar facet arthropathy ([address] M12.88    Lumbar stenosis M48.06       Depression Risk Factor Screening:     PHQ 2 / 9, over the last two weeks 6/29/2016   Little interest or pleasure in doing things Not at all   Feeling down, depressed or hopeless Not at all   Total Score PHQ 2 0     Alcohol Risk Factor Screening: On any occasion during the past 3 months, have you had more than 3 drinks containing alcohol? No    Do you average more than 7 drinks per week? No      Functional Ability and Level of Safety:     Hearing Loss   moderate    Activities of Daily Living   Self-care. Requires assistance with: no ADLs    Fall Risk     Fall Risk Assessment, last 12 mths 9/16/2016   Able to walk? Yes   Fall in past 12 months? Yes   Fall with injury?  Yes   Number of falls in past 12 months 3   Fall Risk Score 4     Abuse Screen   Patient is not abused    Review of Systems   per Dr Kenyon Cole    Physical Examination     Evaluation of Cognitive Function:  Mood/affect:  neutral  Appearance: age appropriate  Family member/caregiver input: N/A    Cognitive functions intact    Patient Care Team:  Kishan Johnson MD as PCP - General (Family Practice)  Kishan Johnson MD (Family Practice)  Damian Savage MD (Ophthalmology)  Guera Milner MD as Consulting Provider (Neurology)    Advice/Referrals/Counseling   Education and counseling provided:  Are appropriate based on today's review and evaluation  Review nurses note and assessment. Agree with that. Discussed 5 years health plan and given copy in AVS.  Discussed advance directive. Given hand out with AVS for more information. Discussed DNR and DNI. Pt had made living wheel in the past during hospitalization at [facility]. Now said she will make a new living wheel again as not sure how her kids will able to decide together if any situation occurs as they all think very different way. She will think and let me know. Assessment/Plan       ICD-10-CM ICD-9-CM    1. Routine general medical examination at a health care facility Z00.00 V70.0    2. Screening for alcoholism Z13.89 V79.1    Pt understood and agrees with above plan. Follow-up Disposition: Not on File.

## 2017-02-01 NOTE — PROGRESS NOTES
1. Have you been to the ER, urgent care clinic since your last visit? Hospitalized since your last visit? No  2. Have you seen or consulted any other health care providers outside of the [address] [place] [address] since your last visit? Include any pap smears or colon screening. Yes Dr. José Alfaro endocrinology two weeks ago. Patient requests refill on Nexium 90 day supply with refills to [facility]. Last flu vaccine 8/2016.

## 2017-02-02 NOTE — PROGRESS NOTES
HISTORY OF PRESENT ILLNESS  Marcio Skinner is a 64 y.o. female. HPI:  here for routine follow up. H/o type 2 diabetes. Very well controlled. Today HBA1C done at the office was 5.9. Compliance with medication. Denies any side effects. H/o hypothyrodism. Seen endocrinology. Recently had labs done there will obtain records. No change in dose of medication yet as not heard from specialist office. For now she will call them for an update as well. Denies any unusual fatigue. Feels ok. No mood changes. No constipation or diarrhea. Feeling fatigue and feels like she sleeps all the time. No depression or anxiety. Has h/o tremor. Probably parkinson's disease. Also having hand tremors more at resting. Which has improved somewhat on medication. Has seen neurology. H/o chronic lower back pain. Following pain management. Having left lower ext sciatica pain and also feeling unsteady gait due to radiculopathy from lumbar spinal stenosis over left lower ext. Has no loss of urine or bowel control. Has been having frequent fall. Today discussed to use support but she does not like to do that. Discussed safety concern and said she will try to use. H/o leg swelling. Lately stable. No concern. No sob. No chest pain. No nausea or vomiting. Appetite fair. No sleep concern. H/o GERD and stable on current medication. No trouble swallowing. No change in voice. She had dexa scan and mammogram done mid last year. Still has not received a records. She thinks it was around aug 2016.    Visit Vitals    /50 (BP 1 Location: Left arm, BP Patient Position: Sitting)    Pulse 77    Temp 98.5 °F (36.9 °C) (Oral)    Resp 16    Ht 5' (1.524 m)    Wt 128 lb (58.1 kg)    SpO2 99%    BMI 25 kg/m2     Lab Results   Component Value Date/Time    Hemoglobin A1c 6.6 07/13/2016 09:51 AM    Hemoglobin A1c (POC) 5.9 02/01/2017 03:52 PM     Lab Results   Component Value Date/Time    WBC 7.6 07/13/2016 09:51 AM    HGB 11.1 07/13/2016 09:51 AM    HCT 32.9 07/13/2016 09:51 AM    PLATELET 597 83/03/2732 09:51 AM    MCV 85.2 07/13/2016 09:51 AM     Lab Results   Component Value Date/Time    Sodium 142 07/13/2016 09:51 AM    Potassium 4.1 07/13/2016 09:51 AM    Chloride 102 07/13/2016 09:51 AM    CO2 31 07/13/2016 09:51 AM    Anion gap 9 07/13/2016 09:51 AM    Glucose 124 07/13/2016 09:51 AM    BUN 27 07/13/2016 09:51 AM    Creatinine 1.15 07/13/2016 09:51 AM    BUN/Creatinine ratio 23 07/13/2016 09:51 AM    GFR est AA 55 07/13/2016 09:51 AM    GFR est non-AA 46 07/13/2016 09:51 AM    Calcium 9.9 07/13/2016 09:51 AM    Bilirubin, total 0.5 07/13/2016 09:51 AM    AST (SGOT) 14 07/13/2016 09:51 AM    Alk. phosphatase 55 07/13/2016 09:51 AM    Protein, total 7.2 07/13/2016 09:51 AM    Albumin 4.1 07/13/2016 09:51 AM    Globulin 3.1 07/13/2016 09:51 AM    A-G Ratio 1.3 07/13/2016 09:51 AM    ALT (SGPT) 19 07/13/2016 09:51 AM     Lab Results   Component Value Date/Time    TSH 0.01 10/26/2016 05:10 PM     Lab Results   Component Value Date/Time    Cholesterol, total 147 07/13/2016 09:51 AM    HDL Cholesterol 41 07/13/2016 09:51 AM    LDL, calculated 56.8 07/13/2016 09:51 AM    VLDL, calculated 49.2 07/13/2016 09:51 AM    Triglyceride 246 07/13/2016 09:51 AM    CHOL/HDL Ratio 3.6 07/13/2016 09:51 AM       ROS: see HPI     Physical Exam   Constitutional: She is oriented to person, place, and time. No distress. Cardiovascular: Normal heart sounds. Pulmonary/Chest: No respiratory distress. She has no wheezes. Abdominal: Soft. There is no tenderness. Musculoskeletal: She exhibits no edema. Generalize lower back discomfort. SLR positive. ROM limited due to discomfort mainly flexion. Neurological: She is oriented to person, place, and time. Resting tremor. Neurologically grossly intact    Psychiatric: Her behavior is normal. Thought content normal.       ASSESSMENT and PLAN    ICD-10-CM ICD-9-CM    1.  Controlled type 2 diabetes mellitus without complication, without long-term current use of insulin ([facility] [address]): well controlled diabetes. Will continue current plan. Will f/u next visit. On statin and ARB  E11.9 250.00 AMB POC HEMOGLOBIN A1C   2. Hypothyroidism, unspecified type: following endocrinology. Will try to obtain recent labs. E03.9 244.9    3. Essential hypertension with goal blood pressure less than 130/80: stable. Continue current plan. I10 401.9    4. Leg swelling: currently stable. Advised to take low salt diet. Keep leg elevated and f/u next visit. On lasix to take as needed. M79.89 729.81    5. . Chronic bilateral low back pain without sciatica/ h/o prior back surgery: now following Dr. Kwon. Having rt hip joint injection for pain relief. Also on pain medication for lower back pain. Will follow specialist recommendations. M54.5 724.2     G89.29 338.29    6. Low TSH level R94.6 794.5    7 Gastroesophageal reflux disease without esophagitis: stable on medication. K21.9 530.81 esomeprazole (NEXIUM) 40 mg capsule   8 Chronic fatigue: could be multiple cause. As could be pain medication or statin. Or thyroid problem . Will observe for now . R53.82 780.79    Pt understood and agrees with above plan.    Review HM   Follow-up Disposition: Not on File

## 2017-02-03 RX ORDER — DIAZEPAM 5 MG/1
10 TABLET ORAL ONCE
Status: CANCELLED | OUTPATIENT
Start: 2017-02-07 | End: 2017-02-07

## 2017-02-03 RX ORDER — SODIUM CHLORIDE 0.9 % (FLUSH) 0.9 %
5-10 SYRINGE (ML) INJECTION AS NEEDED
Status: CANCELLED | OUTPATIENT
Start: 2017-02-07

## 2017-02-07 ENCOUNTER — HOSPITAL ENCOUNTER (OUTPATIENT)
Age: 77
Setting detail: OUTPATIENT SURGERY
Discharge: HOME | End: 2017-02-07
Attending: PHYSICAL MEDICINE & REHABILITATION | Admitting: PHYSICAL MEDICINE & REHABILITATION
Payer: MEDICARE

## 2017-02-07 ENCOUNTER — SURGERY (OUTPATIENT)
Age: 77
End: 2017-02-07

## 2017-02-07 ENCOUNTER — APPOINTMENT (OUTPATIENT)
Dept: GENERAL RADIOLOGY | Age: 77
End: 2017-02-07
Attending: PHYSICAL MEDICINE & REHABILITATION
Payer: MEDICARE

## 2017-02-07 VITALS
RESPIRATION RATE: 17 BRPM | HEART RATE: 63 BPM | BODY MASS INDEX: 25.13 KG/M2 | DIASTOLIC BLOOD PRESSURE: 62 MMHG | HEIGHT: 60 IN | OXYGEN SATURATION: 96 % | SYSTOLIC BLOOD PRESSURE: 117 MMHG | TEMPERATURE: 98.5 F | WEIGHT: 128 LBS

## 2017-02-07 LAB — GLUCOSE BLD STRIP.AUTO-MCNC: 127 MG/DL (ref 70–110)

## 2017-02-07 PROCEDURE — 74011000250 HC RX REV CODE- 250: Performed by: PHYSICAL MEDICINE & REHABILITATION

## 2017-02-07 PROCEDURE — 77030003672 HC NDL SPN HALY -A: Performed by: PHYSICAL MEDICINE & REHABILITATION

## 2017-02-07 PROCEDURE — 74011250636 HC RX REV CODE- 250/636: Performed by: PHYSICAL MEDICINE & REHABILITATION

## 2017-02-07 PROCEDURE — 82962 GLUCOSE BLOOD TEST: CPT

## 2017-02-07 PROCEDURE — 76010000009 HC PAIN MGT 0 TO 30 MIN PROC: Performed by: PHYSICAL MEDICINE & REHABILITATION

## 2017-02-07 PROCEDURE — 74011250636 HC RX REV CODE- 250/636

## 2017-02-07 PROCEDURE — 74011250637 HC RX REV CODE- 250/637: Performed by: PHYSICAL MEDICINE & REHABILITATION

## 2017-02-07 RX ORDER — ROPIVACAINE HYDROCHLORIDE 2 MG/ML
INJECTION, SOLUTION EPIDURAL; INFILTRATION; PERINEURAL AS NEEDED
Status: DISCONTINUED | OUTPATIENT
Start: 2017-02-07 | End: 2017-02-07 | Stop reason: HOSPADM

## 2017-02-07 RX ORDER — DIAZEPAM 5 MG/1
10 TABLET ORAL ONCE
Status: COMPLETED | OUTPATIENT
Start: 2017-02-07 | End: 2017-02-07

## 2017-02-07 RX ORDER — SODIUM CHLORIDE 0.9 % (FLUSH) 0.9 %
5-10 SYRINGE (ML) INJECTION AS NEEDED
Status: DISCONTINUED | OUTPATIENT
Start: 2017-02-07 | End: 2017-02-07 | Stop reason: HOSPADM

## 2017-02-07 RX ORDER — LIDOCAINE HYDROCHLORIDE 10 MG/ML
INJECTION, SOLUTION EPIDURAL; INFILTRATION; INTRACAUDAL; PERINEURAL AS NEEDED
Status: DISCONTINUED | OUTPATIENT
Start: 2017-02-07 | End: 2017-02-07 | Stop reason: HOSPADM

## 2017-02-07 RX ADMIN — DIAZEPAM 5 MG: 5 TABLET ORAL at 09:44

## 2017-02-07 RX ADMIN — ROPIVACAINE HYDROCHLORIDE 6 ML: 2 INJECTION, SOLUTION EPIDURAL; INFILTRATION at 10:10

## 2017-02-07 RX ADMIN — LIDOCAINE HYDROCHLORIDE 6 ML: 10 INJECTION, SOLUTION EPIDURAL; INFILTRATION; INTRACAUDAL; PERINEURAL at 10:10

## 2017-02-07 NOTE — PROCEDURES
[facility]    DIAG LUMBAR FACET INJECTIONS  PROCEDURE REPORT      PATIENT:  Leandro Cline OF BIRTH:  1940  DATE OF SERVICE:  2/7/2017  SITE:  [facility] Special Procedures Suite    PRE-PROCEDURE DIAGNOSIS:  See Above    POST-PROCEDURE DIAGNOSIS:  See Above                PROCEDURE:  1. Bilateral diagnostic lumbar medial branch blocks via the L3/L4,  L4/L5,  L5/S1 medial branch nerves ( 15978, 50;  34164, 50;  23691, 50 )  2. Fluoroscopic needle guidance (54679)      LEVELS TREATED:  Bilateral sided L3/L4,  L4/L5,  L5/S1 medial branch nerves     ANESTHESIA:  See Medication Administration Record    COMPLICATIONS: None. PHYSICIAN:  Love Baltazar MD    PRE-PROCEDURE NOTE:  Pre-procedural assessment of the patient was performed including a limited history and physical examination. The details of the procedure were discussed with the patient, including the risks, benefits and alternative options and an informed consent was obtained. The patients NPO status, if necessary for the specific procedure and/or administration of moderate intravenous sedation, if utilized, and availability of a responsible adult to escort the patient following the procedure were confirmed. PROCEDURE NOTE:  The patient was brought to the procedure suite and positioned on the fluoroscopy table in the prone position. Physiologic monitors were applied and supplemental oxygen was administered via nasal cannula. The skin was prepped in the standard surgical fashion and sterile drapes were applied over the procedure site. Please refer to the Flowsheet for documentation of the patients vital signs and the Medication Administration Record for any oral and/or intravenous sedation administered prior to or during the procedure. 1% Lidocaine was utilized for local anesthesia.  Under AP fluoroscopic guidance a 25-gauge, 3-1/2 inch short bevel spinal needle was advanced to the junction of the superior articular process and transverse process of each vertebral level immediately inferior to the above-mentioned dorsal rami medial branch nerves. A needle was also placed along the sacral ala to block the L5 medial branch nerve. After all needles were placed,  0.5 mL of 0.2% ropivacaine was injected at each location after the negative aspiration of blood, air or CSF. The needles were removed and the stilets were replaced. The procedure was performed on the contralateral side in the same fashion and at the same levels using the same volume of local anesthetic following negative aspiration of blood, air or CSF. The needles were removed intact. The area was thoroughly cleaned and sterile bandages applied as necessary. The patient tolerated the procedure well and vital signs remained stable throughout the procedure. The patient was assessed immediately following the procedure and was noted to have greater than 50% reduction in pain (a reduction from 6/10 to 2/10 in severity of lumbar pain). Based on these results, the patient is considered an appropriate candidate for radiofrequency ablation of the above-mentioned medial branch nerves and will be scheduled for that procedure. The total levels successfully blocked were 3 levels, both sides. POST-PROCEDURE COURSE:  The patient was escorted from the procedure suite in satisfactory condition and recovered per facility protocol based on the type of procedure performed and/or the sedation utilized. The patient did not experience any adverse events and remained hemodynamically stable during the post-procedure period. DISCHARGE NOTE:  Upon discharge, the patient was able to tolerate fluids and was in no acute distress. The patient was oriented to person, place and time and vital signs were stable.  Appropriate post-procedure instructions were provided and explained to the patient in detail and all questions were answered.     Benson Kincaid MD 2/7/2017 11:48 AM

## 2017-02-07 NOTE — INTERVAL H&P NOTE
H&P Update:  Jordan Tyler was seen and examined. History and physical has been reviewed. The patient has been examined.  There have been no significant clinical changes since the completion of the originally dated History and Physical.    Signed By: Juliocesar Saavedra MD     February 7, 2017 9:20 AM

## 2017-02-07 NOTE — PERIOP NOTES
Patient tolerated procedure well. No complications noted. VSS. No redness, swelling, or bleeding from injection site. Dressing dry and intact. Armband removed and shredded. Patient ambulated to main entrance and discharged alive and well, in stable condition.

## 2017-02-07 NOTE — H&P (VIEW-ONLY)
This is a Subsequent Medicare Annual Wellness Visit providing Personalized Prevention Plan Services (PPPS) (Performed 12 months after initial AWV and PPPS )    I have reviewed the patient's medical history in detail and updated the computerized patient record. History     Past Medical History   Diagnosis Date    Acid reflux     Cancer ([facility] [address]      uterine    Diabetes ([facility] [address]     Dizzy spells     Essential hypertension     Gallbladder attack     Hearing loss     High cholesterol     Hypertension     Memory loss     Sinus complaint     SOB (shortness of breath)     Thyroid disease       Past Surgical History   Procedure Laterality Date    Hx orthopaedic       5 back surgeries    Hx hysterectomy       partial hysterectomy    Hx orthopaedic       hand arm and left knee    Hx cataract removal      Hx appendectomy      Hx cholecystectomy      Pr neurological procedure unlisted  1993     Compression Fracture Surgery    Pr neurological procedure unlisted  1994    Pr neurological procedure unlisted  1995     Current Outpatient Prescriptions   Medication Sig Dispense Refill    pramipexole (MIRAPEX) 0.5 mg tablet 1 tab po three times a day 270 Tab 0    metOLazone (ZAROXOLYN) 5 mg tablet TAKE 1 TABLET DAILY 90 Tab 0    amLODIPine (NORVASC) 10 mg tablet TAKE 1 TABLET DAILY 90 Tab 0    simvastatin (ZOCOR) 20 mg tablet TAKE 1 TABLET NIGHTLY 90 Tab 0    NEXIUM 40 mg capsule TAKE 1 CAPSULE EVERY MORNING 90 Cap 0    metFORMIN (GLUCOPHAGE) 1,000 mg tablet TAKE 1 TABLET TWICE A DAY WITH MEALS 180 Tab 1    levothyroxine (SYNTHROID) 100 mcg tablet Take 1 Tab by mouth Daily (before breakfast). 30 Tab 1    magnesium 250 mg tab Take 250 mg by mouth daily.  valsartan (DIOVAN) 160 mg tablet Take 1 Tab by mouth daily. 30 Tab 1    alendronate (FOSAMAX) 70 mg tablet Take 1 Tab by mouth every seven (7) days. 4 Tab 3    predniSONE (DELTASONE) 20 mg tablet Take 20 mg by mouth once as needed for Pain (gout).  gabapentin (NEURONTIN) 300 mg capsule Take 1 Cap by mouth nightly. 30 Cap 0    furosemide (LASIX) 40 mg tablet Take 1 Tab by mouth daily. Takes 40 mg every morning 30 Tab 0    tiZANidine (ZANAFLEX) 4 mg capsule Take 4 mg by mouth nightly.  traMADol (ULTRAM) 50 mg tablet Take 50 mg by mouth every eight (8) hours as needed for Pain.  glucose blood VI test strips (ASCENSIA AUTODISC VI, ONE TOUCH ULTRA TEST VI) strip by Does Not Apply route See Admin Instructions. Check 1-2 times a day 100 Strip 6    Lancets misc Precision X-tra. Check 1-2 times a day 100 Each 6    aspirin 81 mg chewable tablet Take 81 mg by mouth daily.  omega-3 fatty acids-vitamin e 1,000 mg cap Take 1 Cap by mouth two (2) times a day. 1,200 mg 2 times daily      cholecalciferol (VITAMIN D3) 1,000 unit cap Take 1,000 Units by mouth daily.  CRANBERRY CONC/ASCORBIC ACID (CRANBERRY PLUS VITAMIN C PO) Take 4,000 mg by mouth daily. Allergies   Allergen Reactions    Codeine Swelling    Codeine Other (comments)     Severe swelling    Feldene [Piroxicam] Swelling     Severe swelling of hands, face and feet.      Family History   Problem Relation Age of Onset    Cancer Mother 76     Cervical Cancer    Asthma Father     Heart Attack Father     Cancer Maternal Grandmother      colon cancer    Diabetes Maternal Grandmother     Hypertension Maternal Grandmother     Breast Cancer Daughter      two daughters    Diabetes Paternal Grandmother     Heart Disease Paternal Grandmother     Other Maternal Aunt      mental illness due to accident during infancy     Social History   Substance Use Topics    Smoking status: Never Smoker    Smokeless tobacco: Never Used    Alcohol use No     Patient Active Problem List   Diagnosis Code    Diabetic mononeuropathy associated with diabetes mellitus due to underlying condition ([facility] [address] E08.41    Hypothyroidism E03.9    Leg swelling M79.89    Essential hypertension with goal blood pressure less than 130/80 I10    Diabetes mellitus type 2, controlled ([facility]) E11.9    H/O iron deficiency anemia Z86.2    Cramp of both lower extremities R25.2    Chronic bilateral low back pain without sciatica/ h/o prior back surgery M54.5, G89.29    Tremor of both hands R25.1    H/O osteopenia/ per patient. started medication due to high fracture risk  Z87.39    Low TSH level/ recheck labs/ pt wants to wait till next labs before do referral to endocrinologist  R94.6    Spondylosis of lumbar region without myelopathy or radiculopathy M47.816    Lumbar facet arthropathy ([address] M12.88    Lumbar stenosis M48.06       Depression Risk Factor Screening:     PHQ 2 / 9, over the last two weeks 6/29/2016   Little interest or pleasure in doing things Not at all   Feeling down, depressed or hopeless Not at all   Total Score PHQ 2 0     Alcohol Risk Factor Screening: On any occasion during the past 3 months, have you had more than 3 drinks containing alcohol? No    Do you average more than 7 drinks per week? No      Functional Ability and Level of Safety:     Hearing Loss   moderate    Activities of Daily Living   Self-care. Requires assistance with: no ADLs    Fall Risk     Fall Risk Assessment, last 12 mths 9/16/2016   Able to walk? Yes   Fall in past 12 months? Yes   Fall with injury?  Yes   Number of falls in past 12 months 3   Fall Risk Score 4     Abuse Screen   Patient is not abused    Review of Systems   per Dr Celestina Sigala    Physical Examination     Evaluation of Cognitive Function:  Mood/affect:  neutral  Appearance: age appropriate  Family member/caregiver input: N/A    Cognitive functions intact    Patient Care Team:  Tushar Wray MD as PCP - General (Family Practice)  Tushar Wray MD (Family Practice)  Brie Keller MD (Ophthalmology)  Olu Mora MD as Consulting Provider (Neurology)    Advice/Referrals/Counseling   Education and counseling provided:  Are appropriate based on today's review and evaluation  Review nurses note and assessment. Agree with that. Discussed 5 years health plan and given copy in AVS.  Discussed advance directive. Given hand out with AVS for more information. Discussed DNR and DNI. Pt had made living wheel in the past during hospitalization at [facility]. Now said she will make a new living wheel again as not sure how her kids will able to decide together if any situation occurs as they all think very different way. She will think and let me know. Assessment/Plan       ICD-10-CM ICD-9-CM    1. Routine general medical examination at a health care facility Z00.00 V70.0    2. Screening for alcoholism Z13.89 V79.1    Pt understood and agrees with above plan. Follow-up Disposition: Not on File.

## 2017-02-10 ENCOUNTER — TELEPHONE (OUTPATIENT)
Dept: PAIN MANAGEMENT | Age: 77
End: 2017-02-10

## 2017-02-10 NOTE — TELEPHONE ENCOUNTER
Ms. Que Urbano was contacted for follow-up status post [address] on February 7th 2017. She reports:     Pre-procedure numerical pain score: 7/10  Post-procedure numerical pain score immediately after: 3/10  Duration of relief post-procedure (if applicable): 3 days  Improvement in functional activities (if applicable): Yes  Percentage of overall improvement: 75%     COMMENTS:   Ms. Que Urbano states that she has been able to stand and cook a meal then sit down to have dinner with her family; prior to the procedure she cannot recall the last time she was able to do so. She typically would not be able to stand and cook, or either would have to lay across the counter for support and is now able to stand upright for a substantial amount of time. She states that she has been experiencing relief for the past 3 days and the pain is just now starting to return as an achey feeling, instead of the sharp or shooting pain she is accustomed to. The achey feeling improves when she lies down for a short time before resuming activity. She expresses excitement at continuing with RFA as she is looking forward to being pain free for longer periods.

## 2017-02-20 RX ORDER — MIDAZOLAM HYDROCHLORIDE 1 MG/ML
.5-6 INJECTION, SOLUTION INTRAMUSCULAR; INTRAVENOUS
Status: CANCELLED | OUTPATIENT
Start: 2017-02-21

## 2017-02-20 RX ORDER — SODIUM CHLORIDE 0.9 % (FLUSH) 0.9 %
5-10 SYRINGE (ML) INJECTION AS NEEDED
Status: CANCELLED | OUTPATIENT
Start: 2017-02-21

## 2017-02-21 ENCOUNTER — SURGERY (OUTPATIENT)
Age: 77
End: 2017-02-21

## 2017-02-21 ENCOUNTER — HOSPITAL ENCOUNTER (OUTPATIENT)
Age: 77
Setting detail: OUTPATIENT SURGERY
Discharge: HOME | End: 2017-02-21
Attending: PHYSICAL MEDICINE & REHABILITATION | Admitting: PHYSICAL MEDICINE & REHABILITATION
Payer: MEDICARE

## 2017-02-21 ENCOUNTER — APPOINTMENT (OUTPATIENT)
Dept: GENERAL RADIOLOGY | Age: 77
End: 2017-02-21
Attending: PHYSICAL MEDICINE & REHABILITATION
Payer: MEDICARE

## 2017-02-21 VITALS
SYSTOLIC BLOOD PRESSURE: 114 MMHG | OXYGEN SATURATION: 98 % | HEART RATE: 68 BPM | RESPIRATION RATE: 20 BRPM | TEMPERATURE: 97.2 F | DIASTOLIC BLOOD PRESSURE: 61 MMHG

## 2017-02-21 LAB — GLUCOSE BLD STRIP.AUTO-MCNC: 106 MG/DL (ref 70–110)

## 2017-02-21 PROCEDURE — 82962 GLUCOSE BLOOD TEST: CPT

## 2017-02-21 PROCEDURE — 99152 MOD SED SAME PHYS/QHP 5/>YRS: CPT | Performed by: PHYSICAL MEDICINE & REHABILITATION

## 2017-02-21 PROCEDURE — 76010000009 HC PAIN MGT 0 TO 30 MIN PROC: Performed by: PHYSICAL MEDICINE & REHABILITATION

## 2017-02-21 PROCEDURE — 74011250636 HC RX REV CODE- 250/636

## 2017-02-21 PROCEDURE — 99144 HC MOD CS >5 YRS 1ST 30 MINS: CPT | Performed by: PHYSICAL MEDICINE & REHABILITATION

## 2017-02-21 PROCEDURE — 74011000250 HC RX REV CODE- 250: Performed by: PHYSICAL MEDICINE & REHABILITATION

## 2017-02-21 PROCEDURE — 74011250636 HC RX REV CODE- 250/636: Performed by: PHYSICAL MEDICINE & REHABILITATION

## 2017-02-21 PROCEDURE — 77030020508 HC PD GRND GENRTR BAYL -A: Performed by: PHYSICAL MEDICINE & REHABILITATION

## 2017-02-21 RX ORDER — MIDAZOLAM HYDROCHLORIDE 1 MG/ML
.5-6 INJECTION, SOLUTION INTRAMUSCULAR; INTRAVENOUS
Status: DISCONTINUED | OUTPATIENT
Start: 2017-02-21 | End: 2017-02-21 | Stop reason: HOSPADM

## 2017-02-21 RX ORDER — DEXAMETHASONE SODIUM PHOSPHATE 100 MG/10ML
INJECTION INTRAMUSCULAR; INTRAVENOUS AS NEEDED
Status: DISCONTINUED | OUTPATIENT
Start: 2017-02-21 | End: 2017-02-21 | Stop reason: HOSPADM

## 2017-02-21 RX ORDER — SODIUM CHLORIDE 0.9 % (FLUSH) 0.9 %
5-10 SYRINGE (ML) INJECTION AS NEEDED
Status: DISCONTINUED | OUTPATIENT
Start: 2017-02-21 | End: 2017-02-21 | Stop reason: HOSPADM

## 2017-02-21 RX ORDER — FENTANYL CITRATE 50 UG/ML
INJECTION, SOLUTION INTRAMUSCULAR; INTRAVENOUS AS NEEDED
Status: DISCONTINUED | OUTPATIENT
Start: 2017-02-21 | End: 2017-02-21 | Stop reason: HOSPADM

## 2017-02-21 RX ORDER — LIDOCAINE HYDROCHLORIDE 10 MG/ML
INJECTION, SOLUTION EPIDURAL; INFILTRATION; INTRACAUDAL; PERINEURAL AS NEEDED
Status: DISCONTINUED | OUTPATIENT
Start: 2017-02-21 | End: 2017-02-21 | Stop reason: HOSPADM

## 2017-02-21 RX ORDER — LIDOCAINE HYDROCHLORIDE 20 MG/ML
INJECTION, SOLUTION EPIDURAL; INFILTRATION; INTRACAUDAL; PERINEURAL AS NEEDED
Status: DISCONTINUED | OUTPATIENT
Start: 2017-02-21 | End: 2017-02-21 | Stop reason: HOSPADM

## 2017-02-21 RX ADMIN — LIDOCAINE HYDROCHLORIDE 3 ML: 20 INJECTION, SOLUTION INTRAVENOUS at 12:12

## 2017-02-21 RX ADMIN — FENTANYL CITRATE 50 MCG: 50 INJECTION INTRAMUSCULAR; INTRAVENOUS at 12:12

## 2017-02-21 RX ADMIN — DEXAMETHASONE SODIUM PHOSPHATE 6 MG: 10 INJECTION INTRAMUSCULAR; INTRAVENOUS at 12:12

## 2017-02-21 RX ADMIN — LIDOCAINE HYDROCHLORIDE 10 ML: 10 INJECTION, SOLUTION EPIDURAL; INFILTRATION; INTRACAUDAL; PERINEURAL at 12:12

## 2017-02-21 RX ADMIN — MIDAZOLAM HYDROCHLORIDE 1 MG: 1 INJECTION, SOLUTION INTRAMUSCULAR; INTRAVENOUS at 12:12

## 2017-02-21 NOTE — H&P (VIEW-ONLY)
This is a Subsequent Medicare Annual Wellness Visit providing Personalized Prevention Plan Services (PPPS) (Performed 12 months after initial AWV and PPPS )    I have reviewed the patient's medical history in detail and updated the computerized patient record. History     Past Medical History   Diagnosis Date    Acid reflux     Cancer ([facility] [address]      uterine    Diabetes ([facility] [address]     Dizzy spells     Essential hypertension     Gallbladder attack     Hearing loss     High cholesterol     Hypertension     Memory loss     Sinus complaint     SOB (shortness of breath)     Thyroid disease       Past Surgical History   Procedure Laterality Date    Hx orthopaedic       5 back surgeries    Hx hysterectomy       partial hysterectomy    Hx orthopaedic       hand arm and left knee    Hx cataract removal      Hx appendectomy      Hx cholecystectomy      Pr neurological procedure unlisted  1993     Compression Fracture Surgery    Pr neurological procedure unlisted  1994    Pr neurological procedure unlisted  1995     Current Outpatient Prescriptions   Medication Sig Dispense Refill    pramipexole (MIRAPEX) 0.5 mg tablet 1 tab po three times a day 270 Tab 0    metOLazone (ZAROXOLYN) 5 mg tablet TAKE 1 TABLET DAILY 90 Tab 0    amLODIPine (NORVASC) 10 mg tablet TAKE 1 TABLET DAILY 90 Tab 0    simvastatin (ZOCOR) 20 mg tablet TAKE 1 TABLET NIGHTLY 90 Tab 0    NEXIUM 40 mg capsule TAKE 1 CAPSULE EVERY MORNING 90 Cap 0    metFORMIN (GLUCOPHAGE) 1,000 mg tablet TAKE 1 TABLET TWICE A DAY WITH MEALS 180 Tab 1    levothyroxine (SYNTHROID) 100 mcg tablet Take 1 Tab by mouth Daily (before breakfast). 30 Tab 1    magnesium 250 mg tab Take 250 mg by mouth daily.  valsartan (DIOVAN) 160 mg tablet Take 1 Tab by mouth daily. 30 Tab 1    alendronate (FOSAMAX) 70 mg tablet Take 1 Tab by mouth every seven (7) days. 4 Tab 3    predniSONE (DELTASONE) 20 mg tablet Take 20 mg by mouth once as needed for Pain (gout).  gabapentin (NEURONTIN) 300 mg capsule Take 1 Cap by mouth nightly. 30 Cap 0    furosemide (LASIX) 40 mg tablet Take 1 Tab by mouth daily. Takes 40 mg every morning 30 Tab 0    tiZANidine (ZANAFLEX) 4 mg capsule Take 4 mg by mouth nightly.  traMADol (ULTRAM) 50 mg tablet Take 50 mg by mouth every eight (8) hours as needed for Pain.  glucose blood VI test strips (ASCENSIA AUTODISC VI, ONE TOUCH ULTRA TEST VI) strip by Does Not Apply route See Admin Instructions. Check 1-2 times a day 100 Strip 6    Lancets misc Precision X-tra. Check 1-2 times a day 100 Each 6    aspirin 81 mg chewable tablet Take 81 mg by mouth daily.  omega-3 fatty acids-vitamin e 1,000 mg cap Take 1 Cap by mouth two (2) times a day. 1,200 mg 2 times daily      cholecalciferol (VITAMIN D3) 1,000 unit cap Take 1,000 Units by mouth daily.  CRANBERRY CONC/ASCORBIC ACID (CRANBERRY PLUS VITAMIN C PO) Take 4,000 mg by mouth daily. Allergies   Allergen Reactions    Codeine Swelling    Codeine Other (comments)     Severe swelling    Feldene [Piroxicam] Swelling     Severe swelling of hands, face and feet.      Family History   Problem Relation Age of Onset    Cancer Mother 76     Cervical Cancer    Asthma Father     Heart Attack Father     Cancer Maternal Grandmother      colon cancer    Diabetes Maternal Grandmother     Hypertension Maternal Grandmother     Breast Cancer Daughter      two daughters    Diabetes Paternal Grandmother     Heart Disease Paternal Grandmother     Other Maternal Aunt      mental illness due to accident during infancy     Social History   Substance Use Topics    Smoking status: Never Smoker    Smokeless tobacco: Never Used    Alcohol use No     Patient Active Problem List   Diagnosis Code    Diabetic mononeuropathy associated with diabetes mellitus due to underlying condition ([facility] [address] E08.41    Hypothyroidism E03.9    Leg swelling M79.89    Essential hypertension with goal blood pressure less than 130/80 I10    Diabetes mellitus type 2, controlled ([facility] E11.9    H/O iron deficiency anemia Z86.2    Cramp of both lower extremities R25.2    Chronic bilateral low back pain without sciatica/ h/o prior back surgery M54.5, G89.29    Tremor of both hands R25.1    H/O osteopenia/ per patient. started medication due to high fracture risk  Z87.39    Low TSH level/ recheck labs/ pt wants to wait till next labs before do referral to endocrinologist  R94.6    Spondylosis of lumbar region without myelopathy or radiculopathy M47.816    Lumbar facet arthropathy ([address] M12.88    Lumbar stenosis M48.06       Depression Risk Factor Screening:     PHQ 2 / 9, over the last two weeks 6/29/2016   Little interest or pleasure in doing things Not at all   Feeling down, depressed or hopeless Not at all   Total Score PHQ 2 0     Alcohol Risk Factor Screening: On any occasion during the past 3 months, have you had more than 3 drinks containing alcohol? No    Do you average more than 7 drinks per week? No      Functional Ability and Level of Safety:     Hearing Loss   moderate    Activities of Daily Living   Self-care. Requires assistance with: no ADLs    Fall Risk     Fall Risk Assessment, last 12 mths 9/16/2016   Able to walk? Yes   Fall in past 12 months? Yes   Fall with injury?  Yes   Number of falls in past 12 months 3   Fall Risk Score 4     Abuse Screen   Patient is not abused    Review of Systems   per Dr Bakari Alxeandre    Physical Examination     Evaluation of Cognitive Function:  Mood/affect:  neutral  Appearance: age appropriate  Family member/caregiver input: N/A    Cognitive functions intact    Patient Care Team:  Radha Cuenca MD as PCP - General (Family Practice)  Radha Cuenca MD (Family Practice)  Diana Benjamin MD (Ophthalmology)  Karon Bhatia MD as Consulting Provider (Neurology)    Advice/Referrals/Counseling   Education and counseling provided:  Are appropriate based on today's review and evaluation  Review nurses note and assessment. Agree with that. Discussed 5 years health plan and given copy in AVS.  Discussed advance directive. Given hand out with AVS for more information. Discussed DNR and DNI. Pt had made living wheel in the past during hospitalization at [facility]. Now said she will make a new living wheel again as not sure how her kids will able to decide together if any situation occurs as they all think very different way. She will think and let me know. Assessment/Plan       ICD-10-CM ICD-9-CM    1. Routine general medical examination at a health care facility Z00.00 V70.0    2. Screening for alcoholism Z13.89 V79.1    Pt understood and agrees with above plan. Follow-up Disposition: Not on File.

## 2017-02-21 NOTE — PROCEDURES
[facility]    THERMOCOAGULATION OF LUMBAR MEDIAL BRANCH  PROCEDURE REPORT      PATIENT:  Chidi De La Rosa OF BIRTH:  1940  DATE OF SERVICE:  2/21/2017  SITE:  [facility] Special Procedures Suite    PRE-PROCEDURE DIAGNOSIS:  See Above    POST-PROCEDURE DIAGNOSIS:  See Above    PROCEDURE:      1. Right radiofrequency thermocoagulation of lumbar medial branch nerves, L3/L4, L4/L5, L5/S1 (95115, 64636 x2)  2. Fluoroscopic needle guidance (spinal) (74226)  3. Supervision of moderate sedation (54944)  4. Additional 15 minutes of supervised moderate sedation (66842)    ANESTHESIA:  Local with moderate IV sedation. See Medication Administration Record for specific medications and dosage. COMPLICATIONS: None. PHYSICIAN:  Carleen Bhatia MD    PRE-PROCEDURE NOTE:  Pre-procedural assessment of the patient was performed including a limited history and physical examination. The details of the procedure were discussed with the patient, including the risks, benefits and alternative options and an informed consent was obtained. The patients NPO status, if necessary for the specific procedure and/or administration of moderate intravenous sedation, if utilized, and availability of a responsible adult to escort the patient following the procedure were confirmed. A peripheral intravenous cannula was placed without difficulty and lactated Ringers solution administered. See nursing notes for details. PROCEDURE NOTE:  The patient was brought to the procedure suite and positioned on the fluoroscopy table in the prone position. Physiologic monitors were applied and supplemental oxygen was administered via nasal cannula. The skin was prepped in the standard surgical fashion and sterile drapes were applied over the procedure site.  Please refer to the Flowsheet for documentation of the patients vital signs and the Medication Administration Record for any oral and/or intravenous sedation administered prior to or during the procedure. 1% Lidocaine was utilized for local anesthesia. Under 10-15 degree ipsilateral oblique fluoroscopic guidance a 15cm 18gauge radiofrequency needle with a 10 mm curved active tip was advanced to the junction of the superior articular process and transverse process of each vertebral level immediately inferior to the above-mentioned dorsal rami medial branch nerves. Under AP fluoroscopic guidance, a similar needle was then placed over the superior margin of the sacral ala to thermocoagulate the L5 medial branch nerve. After each individual needle was placed, sensory and motor testing, at 50 Hz and 2 Hz, respectively, were performed which elicited ipsilateral deep local back discomfort without evidence of motor stimulation in the ipsilateral gluteal muscles or extremity. Following this, 1 mL of lidocaine 2% was injected after the negative aspiration of blood, air or CSF. Final correct needle placement was then confirmed by viewing each needle in AP and lateral fluoroscopic views in addition to repeat sensory and motor testing which, again, elicited ipsilateral deep local back discomfort without evidence of motor stimulation in the ipsilateral gluteal muscles or extremity. Medial branch nerve radiofrequency thermocoagulation was then performed at each level for 120 seconds at 80° centigrade x 1 cycle. Following this, 1/3ml  to 1/2ml of a mixture of lidocaine 1% admixed with dexamethasone 2mg [10mg/ml] was injected through each radiofrequency needle after negative aspiration and before removing each needle. Then, all needles were removed intact. The area was thoroughly cleaned and sterile bandages applied as necessary. The patient tolerated the procedure well without complication and the vital signs remained stable throughout the procedure.     POST-PROCEDURE COURSE:   The patient was escorted from the procedure suite in satisfactory condition and recovered per facility protocol based on the type of procedure performed and/or the sedation utilized. The patient did not experience any adverse events and remained hemodynamically stable during the post-procedure period. DISCHARGE NOTE:  Upon discharge, the patient was able to tolerate fluids and was in no acute distress. The patient was oriented to person, place and time and vital signs were stable. Appropriate post-procedure instructions were provided and explained to the patient in detail and all questions were answered.                     Ngozi Cifuentes MD 2/21/2017 12:27 PM

## 2017-02-21 NOTE — DISCHARGE INSTRUCTIONS
DISCHARGE SUMMARY from Nurse    The following personal items are in your possession at time of discharge:                                    PATIENT INSTRUCTIONS:    After general anesthesia or intravenous sedation, for 24 hours or while taking prescription Narcotics:  · Limit your activities  · Do not drive and operate hazardous machinery  · Do not make important personal or business decisions  · Do  not drink alcoholic beverages  · If you have not urinated within 8 hours after discharge, please contact your surgeon on call. Report the following to your surgeon:  · Excessive pain, swelling, redness or odor of or around the surgical area  · Temperature over 100.5  · Nausea and vomiting lasting longer than 4 hours or if unable to take medications  · Any signs of decreased circulation or nerve impairment to extremity: change in color, persistent  numbness, tingling, coldness or increase pain  · Any questions        What to do at Home:  Recommended activity: {discharge activity:23274}, ***    If you experience any of the following symptoms ***, please follow up with ***. *  Please give a list of your current medications to your Primary Care Provider. *  Please update this list whenever your medications are discontinued, doses are      changed, or new medications (including over-the-counter products) are added. *  Please carry medication information at all times in case of emergency situations. These are general instructions for a healthy lifestyle:    No smoking/ No tobacco products/ Avoid exposure to second hand smoke    Surgeon General's Warning:  Quitting smoking now greatly reduces serious risk to your health.     Obesity, smoking, and sedentary lifestyle greatly increases your risk for illness    A healthy diet, regular physical exercise & weight monitoring are important for maintaining a healthy lifestyle    You may be retaining fluid if you have a history of heart failure or if you experience any of the following symptoms:  Weight gain of 3 pounds or more overnight or 5 pounds in a week, increased swelling in our hands or feet or shortness of breath while lying flat in bed. Please call your doctor as soon as you notice any of these symptoms; do not wait until your next office visit. Recognize signs and symptoms of STROKE:    F-face looks uneven    A-arms unable to move or move unevenly    S-speech slurred or non-existent    T-time-call 911 as soon as signs and symptoms begin-DO NOT go       Back to bed or wait to see if you get better-TIME IS BRAIN. Warning Signs of HEART ATTACK     Call 911 if you have these symptoms:   Chest discomfort. Most heart attacks involve discomfort in the center of the chest that lasts more than a few minutes, or that goes away and comes back. It can feel like uncomfortable pressure, squeezing, fullness, or pain.  Discomfort in other areas of the upper body. Symptoms can include pain or discomfort in one or both arms, the back, neck, jaw, or stomach.  Shortness of breath with or without chest discomfort.  Other signs may include breaking out in a cold sweat, nausea, or lightheadedness. Don't wait more than five minutes to call 911 - MINUTES MATTER! Fast action can save your life. Calling 911 is almost always the fastest way to get lifesaving treatment. Emergency Medical Services staff can begin treatment when they arrive -- up to an hour sooner than if someone gets to the hospital by car. The discharge information has been reviewed with the {PATIENT PARENT GUARDIAN:23483}. The {PATIENT PARENT GUARDIAN:38996} verbalized understanding. Discharge medications reviewed with the {Dishcarge meds reviewed [facility]} and appropriate educational materials and side effects teaching were provided.

## 2017-02-26 DIAGNOSIS — M79.18 MYOFASCIAL PAIN: ICD-10-CM

## 2017-02-26 DIAGNOSIS — Z87.39 H/O OSTEOPENIA: ICD-10-CM

## 2017-02-26 DIAGNOSIS — M47.899 FACET SYNDROME: ICD-10-CM

## 2017-02-27 RX ORDER — ALENDRONATE SODIUM 70 MG/1
TABLET ORAL
Qty: 12 TAB | Refills: 0 | Status: SHIPPED | OUTPATIENT
Start: 2017-02-27 | End: 2017-05-22 | Stop reason: SDUPTHER

## 2017-02-28 ENCOUNTER — TELEPHONE (OUTPATIENT)
Dept: PAIN MANAGEMENT | Age: 77
End: 2017-02-28

## 2017-02-28 NOTE — TELEPHONE ENCOUNTER
Ms. Zhanna Kerr was contacted for follow-up status post [address] on February 7, 2017.  She reports:    Pre-procedure numerical pain score: 9/10  Post-procedure numerical pain score immediately after: 4/10  Duration of relief post-procedure (if applicable): 2 days  Improvement in functional activities (if applicable): Yes  Percentage of overall improvement: 50%    COMMENTS:

## 2017-02-28 NOTE — TELEPHONE ENCOUNTER
Ms. Chavarria All was contacted for follow-up status post [address] on February 21, 2017.  She reports:    Pre-procedure numerical pain score: 7/10  Post-procedure numerical pain score immediately after: 3/10  Duration of relief post-procedure (if applicable): 1 weeks  Improvement in functional activities (if applicable): Yes  Percentage of overall improvement: 50%    COMMENTS:

## 2017-03-07 DIAGNOSIS — M79.89 LEG SWELLING: ICD-10-CM

## 2017-03-07 RX ORDER — METOLAZONE 5 MG/1
TABLET ORAL
Qty: 90 TAB | Refills: 0 | Status: SHIPPED | OUTPATIENT
Start: 2017-03-07 | End: 2017-05-31

## 2017-03-07 RX ORDER — SIMVASTATIN 20 MG/1
TABLET, FILM COATED ORAL
Qty: 90 TAB | Refills: 0 | Status: SHIPPED | OUTPATIENT
Start: 2017-03-07 | End: 2017-05-31 | Stop reason: SDUPTHER

## 2017-03-07 RX ORDER — AMLODIPINE BESYLATE 10 MG/1
TABLET ORAL
Qty: 90 TAB | Refills: 0 | Status: SHIPPED | OUTPATIENT
Start: 2017-03-07 | End: 2017-05-31 | Stop reason: SDUPTHER

## 2017-03-14 ENCOUNTER — OFFICE VISIT (OUTPATIENT)
Dept: ORTHOPEDIC SURGERY | Age: 77
End: 2017-03-14

## 2017-03-14 VITALS
TEMPERATURE: 97.9 F | DIASTOLIC BLOOD PRESSURE: 50 MMHG | BODY MASS INDEX: 25.13 KG/M2 | HEIGHT: 60 IN | SYSTOLIC BLOOD PRESSURE: 120 MMHG | RESPIRATION RATE: 18 BRPM | WEIGHT: 128 LBS | OXYGEN SATURATION: 99 % | HEART RATE: 75 BPM

## 2017-03-14 DIAGNOSIS — M48.061 LUMBAR STENOSIS: ICD-10-CM

## 2017-03-14 DIAGNOSIS — M54.16 LUMBAR RADICULOPATHY: ICD-10-CM

## 2017-03-14 DIAGNOSIS — M25.562 PAIN IN BOTH KNEES, UNSPECIFIED CHRONICITY: ICD-10-CM

## 2017-03-14 DIAGNOSIS — M47.899 FACET SYNDROME: Primary | ICD-10-CM

## 2017-03-14 DIAGNOSIS — M25.561 PAIN IN BOTH KNEES, UNSPECIFIED CHRONICITY: ICD-10-CM

## 2017-03-14 RX ORDER — DICLOFENAC SODIUM 10 MG/G
GEL TOPICAL 4 TIMES DAILY
Qty: 5 EACH | Refills: 2 | Status: SHIPPED | OUTPATIENT
Start: 2017-03-14 | End: 2019-02-08

## 2017-03-14 RX ORDER — DICLOFENAC SODIUM 10 MG/G
GEL TOPICAL 4 TIMES DAILY
Qty: 5 EACH | Refills: 2 | Status: SHIPPED | OUTPATIENT
Start: 2017-03-14 | End: 2017-03-14 | Stop reason: SDUPTHER

## 2017-03-14 RX ORDER — LEVOTHYROXINE SODIUM 88 UG/1
TABLET ORAL
Refills: 2 | COMMUNITY
Start: 2017-02-02 | End: 2017-05-31 | Stop reason: DRUGHIGH

## 2017-03-14 NOTE — MR AVS SNAPSHOT
Visit Information Date & Time Provider Department Dept. Phone Encounter #  
 3/14/2017  3:45 PM Glo Maravilla MD [facility] [phone] [id number] Follow-up Instructions Return if symptoms worsen or fail to improve. Routing History Follow-up and Disposition History Your Appointments 3/15/2017 11:00 AM  
PROCEDURE with Christy Eli MD  
[facility] (WENDIE SCHEDULING) Appt Note: LT RFA @ L3, L4, L5 per Henick [address] [facility] [address] [facility]  
[phone]  
  
   
 [facility] [id number]  
  
    
 3/22/2017  3:45 PM  
Office Visit with Christy Eli MD  
[address] [place] [address] for Pain Management [facility]) Appt Note: POST PROCEDURE F.U FROM RFA  
 [address] [facility] [address]  
[phone] [address]  
  
    
 4/28/2017  4:00 PM  
Follow Up with Patricia Wolf MD  
[address] [place] [address] [facility] Appt Note: 3MON F/U  
 [address] [address]  
[phone]  
  
   
 [facility] [id number] 5/31/2017  2:45 PM  
ROUTINE CARE with Kerry Bonilla MD  
[address] [facility] Appt Note: 3 mo fu; LMOV to reschedule appt /POO 2/17/2017 kjs; .  
 [address] [facility] [address]  
[address] [facility] [address] [facility] [address] Upcoming Health Maintenance Date Due MICROALBUMIN Q1 7/13/2017 LIPID PANEL Q1 7/13/2017 FOOT EXAM Q1 7/14/2017 Pneumococcal 65+ Low/Medium Risk (2 of 2 - PPSV23) 7/20/2017 HEMOGLOBIN A1C Q6M 8/1/2017 EYE EXAM RETINAL OR DILATED Q1 10/12/2017 MEDICARE YEARLY EXAM 2/2/2018 GLAUCOMA SCREENING Q2Y 10/12/2018 DTaP/Tdap/Td series (2 - Td) 7/20/2026 Allergies as of 3/14/2017  Review Complete On: 3/14/2017 By: Elliott Elmore MD  
  
 Severity Noted Reaction Type Reactions Codeine  06/29/2016    Swelling Codeine  07/01/2016    Other (comments) Severe swelling Feldene [Piroxicam]  06/29/2016    Swelling Severe swelling of hands, face and feet. Current Immunizations  Never Reviewed Name Date Influenza Vaccine 8/1/2016 Not reviewed this visit You Were Diagnosed With   
  
 Codes Comments Facet syndrome    -  Primary ICD-10-CM: M12.88 ICD-9-CM: 724.8 Lumbar stenosis     ICD-10-CM: M48.06 
ICD-9-CM: 724.02 Lumbar radiculopathy     ICD-10-CM: M54.16 
ICD-9-CM: 724.4 Pain in both knees, unspecified chronicity     ICD-10-CM: M25.561, M25.562 ICD-9-CM: 719.46 Vitals BP Pulse Temp Resp Height(growth percentile) Weight(growth percentile) 120/50 75 97.9 °F (36.6 °C) (Oral) 18 5' (1.524 m) 128 lb (58.1 kg) SpO2 BMI OB Status Smoking Status 99% 25 kg/m2 Hysterectomy Never Smoker Vitals History BMI and BSA Data Body Mass Index Body Surface Area  
 25 kg/m 2 1.57 m 2 Preferred Pharmacy Pharmacy Name Phone [address] [facility] [phone] Your Updated Medication List  
  
   
This list is accurate as of: 3/14/17  4:21 PM.  Always use your most recent med list.  
  
  
  
  
 alendronate 70 mg tablet Commonly known as:  FOSAMAX TAKE 1 TABLET EVERY 7 DAYS  
  
 amLODIPine 10 mg tablet Commonly known as:  Kia Gabby TAKE 1 TABLET DAILY  
  
 aspirin 81 mg chewable tablet Take 81 mg by mouth daily. cholecalciferol 1,000 unit Cap Commonly known as:  VITAMIN D3 Take 1,000 Units by mouth daily. CRANBERRY PLUS VITAMIN C PO Take 4,000 mg by mouth daily. diclofenac 1 % Gel Commonly known as:  VOLTAREN Apply  to affected area four (4) times daily. esomeprazole 40 mg capsule Commonly known as:  NexIUM Take 1 Cap by mouth daily. furosemide 40 mg tablet Commonly known as:  LASIX Take 1 Tab by mouth daily. Takes 40 mg every morning  
  
 gabapentin 300 mg capsule Commonly known as:  NEURONTIN Take 1 Cap by mouth nightly. glucose blood VI test strips strip Commonly known as:  ASCENSIA AUTODISC VI, ONE TOUCH ULTRA TEST VI  
by Does Not Apply route See Admin Instructions. Check 1-2 times a day Lancets Misc Precision X-tra. Check 1-2 times a day * levothyroxine 100 mcg tablet Commonly known as:  SYNTHROID Take 1 Tab by mouth Daily (before breakfast). * levothyroxine 88 mcg tablet Commonly known as:  SYNTHROID  
  
 magnesium 250 mg Tab Take 250 mg by mouth daily. metFORMIN 1,000 mg tablet Commonly known as:  GLUCOPHAGE  
TAKE 1 TABLET TWICE A DAY WITH MEALS  
  
 metOLazone 5 mg tablet Commonly known as:  ZAROXOLYN  
TAKE 1 TABLET DAILY  
  
 omega-3 fatty acids-vitamin e 1,000 mg Cap Take 1 Cap by mouth two (2) times a day. 1,200 mg 2 times daily  
  
 pramipexole 0.5 mg tablet Commonly known as:  MIRAPEX  
1 tab po three times a day  
  
 predniSONE 20 mg tablet Commonly known as:  Alinda Melinda Take 20 mg by mouth once as needed for Pain (gout). simvastatin 20 mg tablet Commonly known as:  ZOCOR  
TAKE 1 TABLET NIGHTLY  
  
 tiZANidine 4 mg capsule Commonly known as:  Kinjal Carmichael Take 4 mg by mouth nightly. traMADol 50 mg tablet Commonly known as:  ULTRAM  
Take 50 mg by mouth every eight (8) hours as needed for Pain.  
  
 valsartan 160 mg tablet Commonly known as:  DIOVAN Take 1 Tab by mouth daily. * Notice: This list has 2 medication(s) that are the same as other medications prescribed for you. Read the directions carefully, and ask your doctor or other care provider to review them with you. Prescriptions Sent to Pharmacy  Refills  
 diclofenac (VOLTAREN) 1 % gel 2  
 Sig: Apply  to affected area four (4) times daily. Class: Normal  
 Pharmacy: [address], [address] [place] [address] #: [phone] Route: Topical  
  
Follow-up Instructions Return if symptoms worsen or fail to improve. Patient Instructions Medications that treat pain: 
1) Gabapentin (Neurontin) 300 mg at night time is for nerve pain. 2) Tizanidine (Zanaflex) 4 mg at night time is for muscle spasms due to nerve damage. 3) Tramadol (Ultram) 50 mg every 8 hours as needed for pain is for any cause of pain. This is an opioid medication. Introducing \[facility]\"" & [facility] SERVICES! Dear Martha Vaughan: Thank you for requesting a [employer] account. Our records indicate that you already have an active [employer] account. You can access your account anytime at [email or web address] Did you know that you can access your hospital and ER discharge instructions at any time in [employer]? You can also review all of your test results from your hospital stay or ER visit. Additional Information If you have questions, please visit the Frequently Asked Questions section of the [employer] website at [email or web address] Remember, [employer] is NOT to be used for urgent needs. For medical emergencies, dial 911. Now available from your iPhone and Android! Please provide this summary of care documentation to your next provider. Your primary care clinician is listed as Francia Palacio. If you have any questions after today's visit, please call [phone].

## 2017-03-14 NOTE — PATIENT INSTRUCTIONS
Medications that treat pain:  1) Gabapentin (Neurontin) 300 mg at night time is for nerve pain. 2) Tizanidine (Zanaflex) 4 mg at night time is for muscle spasms due to nerve damage. 3) Tramadol (Ultram) 50 mg every 8 hours as needed for pain is for any cause of pain. This is an opioid medication.
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
## 2017-03-15 ENCOUNTER — SURGERY (OUTPATIENT)
Age: 77
End: 2017-03-15

## 2017-03-15 ENCOUNTER — APPOINTMENT (OUTPATIENT)
Dept: GENERAL RADIOLOGY | Age: 77
End: 2017-03-15
Attending: PHYSICAL MEDICINE & REHABILITATION
Payer: MEDICARE

## 2017-03-15 ENCOUNTER — HOSPITAL ENCOUNTER (OUTPATIENT)
Age: 77
Setting detail: OUTPATIENT SURGERY
Discharge: HOME | End: 2017-03-15
Attending: PHYSICAL MEDICINE & REHABILITATION | Admitting: PHYSICAL MEDICINE & REHABILITATION
Payer: MEDICARE

## 2017-03-15 VITALS
TEMPERATURE: 98.4 F | RESPIRATION RATE: 16 BRPM | OXYGEN SATURATION: 97 % | HEART RATE: 69 BPM | DIASTOLIC BLOOD PRESSURE: 54 MMHG | HEIGHT: 60 IN | SYSTOLIC BLOOD PRESSURE: 93 MMHG | WEIGHT: 128 LBS | BODY MASS INDEX: 25.13 KG/M2

## 2017-03-15 LAB — GLUCOSE BLD STRIP.AUTO-MCNC: 95 MG/DL (ref 70–110)

## 2017-03-15 PROCEDURE — 74011000250 HC RX REV CODE- 250: Performed by: PHYSICAL MEDICINE & REHABILITATION

## 2017-03-15 PROCEDURE — 99152 MOD SED SAME PHYS/QHP 5/>YRS: CPT | Performed by: PHYSICAL MEDICINE & REHABILITATION

## 2017-03-15 PROCEDURE — 74011250636 HC RX REV CODE- 250/636

## 2017-03-15 PROCEDURE — 74011250636 HC RX REV CODE- 250/636: Performed by: PHYSICAL MEDICINE & REHABILITATION

## 2017-03-15 PROCEDURE — 82962 GLUCOSE BLOOD TEST: CPT

## 2017-03-15 PROCEDURE — 74011000250 HC RX REV CODE- 250

## 2017-03-15 PROCEDURE — 76010000009 HC PAIN MGT 0 TO 30 MIN PROC: Performed by: PHYSICAL MEDICINE & REHABILITATION

## 2017-03-15 PROCEDURE — 77030020508 HC PD GRND GENRTR BAYL -A: Performed by: PHYSICAL MEDICINE & REHABILITATION

## 2017-03-15 RX ORDER — FENTANYL CITRATE 50 UG/ML
INJECTION, SOLUTION INTRAMUSCULAR; INTRAVENOUS AS NEEDED
Status: DISCONTINUED | OUTPATIENT
Start: 2017-03-15 | End: 2017-03-15 | Stop reason: HOSPADM

## 2017-03-15 RX ORDER — SODIUM CHLORIDE 0.9 % (FLUSH) 0.9 %
5-10 SYRINGE (ML) INJECTION AS NEEDED
Status: DISCONTINUED | OUTPATIENT
Start: 2017-03-15 | End: 2017-03-15 | Stop reason: HOSPADM

## 2017-03-15 RX ORDER — LIDOCAINE HYDROCHLORIDE 10 MG/ML
INJECTION, SOLUTION EPIDURAL; INFILTRATION; INTRACAUDAL; PERINEURAL AS NEEDED
Status: DISCONTINUED | OUTPATIENT
Start: 2017-03-15 | End: 2017-03-15 | Stop reason: HOSPADM

## 2017-03-15 RX ORDER — SODIUM CHLORIDE 0.9 % (FLUSH) 0.9 %
5-10 SYRINGE (ML) INJECTION AS NEEDED
Status: CANCELLED | OUTPATIENT
Start: 2017-03-15

## 2017-03-15 RX ORDER — DEXAMETHASONE SODIUM PHOSPHATE 100 MG/10ML
INJECTION INTRAMUSCULAR; INTRAVENOUS AS NEEDED
Status: DISCONTINUED | OUTPATIENT
Start: 2017-03-15 | End: 2017-03-15 | Stop reason: HOSPADM

## 2017-03-15 RX ORDER — LIDOCAINE HYDROCHLORIDE 20 MG/ML
INJECTION, SOLUTION EPIDURAL; INFILTRATION; INTRACAUDAL; PERINEURAL AS NEEDED
Status: DISCONTINUED | OUTPATIENT
Start: 2017-03-15 | End: 2017-03-15 | Stop reason: HOSPADM

## 2017-03-15 RX ORDER — MIDAZOLAM HYDROCHLORIDE 1 MG/ML
.5-6 INJECTION, SOLUTION INTRAMUSCULAR; INTRAVENOUS
Status: CANCELLED | OUTPATIENT
Start: 2017-03-15

## 2017-03-15 RX ORDER — MIDAZOLAM HYDROCHLORIDE 1 MG/ML
.5-6 INJECTION, SOLUTION INTRAMUSCULAR; INTRAVENOUS
Status: DISCONTINUED | OUTPATIENT
Start: 2017-03-15 | End: 2017-03-15 | Stop reason: HOSPADM

## 2017-03-15 RX ORDER — MIDAZOLAM HYDROCHLORIDE 1 MG/ML
INJECTION, SOLUTION INTRAMUSCULAR; INTRAVENOUS AS NEEDED
Status: DISCONTINUED | OUTPATIENT
Start: 2017-03-15 | End: 2017-03-15 | Stop reason: HOSPADM

## 2017-03-15 RX ADMIN — Medication 5 ML: at 12:20

## 2017-03-15 RX ADMIN — FENTANYL CITRATE 50 MCG: 50 INJECTION INTRAMUSCULAR; INTRAVENOUS at 12:20

## 2017-03-15 RX ADMIN — LIDOCAINE HYDROCHLORIDE 3 ML: 20 INJECTION, SOLUTION INTRAVENOUS at 12:30

## 2017-03-15 RX ADMIN — DEXAMETHASONE SODIUM PHOSPHATE 6 MG: 10 INJECTION INTRAMUSCULAR; INTRAVENOUS at 12:30

## 2017-03-15 RX ADMIN — LIDOCAINE HYDROCHLORIDE 10 ML: 10 INJECTION, SOLUTION EPIDURAL; INFILTRATION; INTRACAUDAL; PERINEURAL at 12:22

## 2017-03-15 RX ADMIN — MIDAZOLAM HYDROCHLORIDE 1 MG: 1 INJECTION, SOLUTION INTRAMUSCULAR; INTRAVENOUS at 12:20
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
## 2017-03-15 NOTE — INTERVAL H&P NOTE
H&P Update:  Romayne Boop was seen and examined. History and physical has been reviewed. The patient has been examined.  There have been no significant clinical changes since the completion of the originally dated History and Physical.    Signed By: Marycarmen Courtney MD     March 15, 2017 10:30 AM

## 2017-03-15 NOTE — DISCHARGE INSTRUCTIONS
[address] [place] [address] for Pain Management      Post Procedures Instructions    *Resume Diet and Activity as tolerated. Rest for the remainder of the day. *You may fell worse before you feel better as the numbing medications wear off before the steroids take effect if used for your procedures. *Do not use affected extremity until numbness or loss of sensation has completely resolved without assistance. *DO NOT DRIVE, operate machinery/heavey equipment for 24 hours. *DO NOT DRINK ALCOHOL for 24 hours as it may interact with the sedation if you received it and also thins your blood and may cause you to bleed. *WAIT 24 hours before starting back ANY Blood thinning medications:   (Heparin, Coumadin, Warfarin, Lovenox, Plavix, Aggrenox)    *Resume Pre-Procedure Medications as prescribed except Blood Thinners unless directed by your Physician or Cardiologist.     *Avoid Hot tubs and Heating pad for 24 hours to prevent dissipation of medications, you may shower to remove bandages and remaining prep residue on the skin. * If you develop a Headache, drink plenty of fluids including beverages with caffeine (Coffee, Mt. Dew etc.) and rest.  If the headache persists longer than 24 hoursor intensifies - Please call [facility] ([facility]) [phone]      * If you are DIABETIC, check your blood sugar three times a day for the next three days, the steroids will increase your blood sugar. If your blood sugar is greater than 400 have someone drive you to the nearest [address]. * If you experience any of the following problems, call the [facility] [phone] between 8:00 am - 4:30pm or After Hours [phone].     Shortness of breath    Fever of 101 F or higher    Nausea / Vomiting (not normal to you)    Increasing stiffness in the neck    Weakness or numbness in the arms or legs that is not resolving    Prolonged and increasing pain > than 4 days    ANYTHING OUT of the ORDINARY TO YOU    If YOU are experiencing a severe reaction / complication that you have never had before post procedure, call 911 or go to the nearest emergency room! All patients must have a  for transportation [place] regardless if you do or do not receive sedation. DISCHARGE SUMMARY from Nurse      PATIENT INSTRUCTIONS:    After Oral  or intravenous sedation, for 24 hours or while taking prescription Narcotics:  · Limit your activities  · Do not drive and operate hazardous machinery  · Do not make important personal or business decisions  · Do  not drink alcoholic beverages  · If you have not urinated within 8 hours after discharge, please contact your surgeon on call. Report the following to your surgeon:  · Excessive pain, swelling, redness or odor of or around the surgical area  · Temperature over 101  · Nausea and vomiting lasting longer than 4 hours or if unable to take medications  · Any signs of decreased circulation or nerve impairment to extremity: change in color, persistent  numbness, tingling, coldness or increase pain  · Any questions        What to do at Home:  Recommended activity: Activity as tolerated, NO DRIVING FOR 24 Hours post injection          *  Please give a list of your current medications to your Primary Care Provider. *  Please update this list whenever your medications are discontinued, doses are      changed, or new medications (including over-the-counter products) are added. *  Please carry medication information at all times in case of emergency situations. These are general instructions for a healthy lifestyle:    No smoking/ No tobacco products/ Avoid exposure to second hand smoke    Surgeon General's Warning:  Quitting smoking now greatly reduces serious risk to your health.     Obesity, smoking, and sedentary lifestyle greatly increases your risk for illness    A healthy diet, regular physical exercise & weight monitoring are important for maintaining a healthy lifestyle    You may be retaining fluid if you have a history of heart failure or if you experience any of the following symptoms:  Weight gain of 3 pounds or more overnight or 5 pounds in a week, increased swelling in our hands or feet or shortness of breath while lying flat in bed. Please call your doctor as soon as you notice any of these symptoms; do not wait until your next office visit. Recognize signs and symptoms of STROKE:    F-face looks uneven    A-arms unable to move or move unevenly    S-speech slurred or non-existent    T-time-call 911 as soon as signs and symptoms begin-DO NOT go       Back to bed or wait to see if you get better-TIME IS BRAIN. [facility] Activation    Thank you for requesting access to [facility]. Please follow the instructions below to securely access and download your online medical record. [facility] allows you to send messages to your doctor, view your test results, renew your prescriptions, schedule appointments, and more. How Do I Sign Up? 1. In your internet browser, go to [email or web address]  2. Click on the First Time User? Click Here link in the Sign In box. You will be redirect to the New Member Sign Up page. 3. Enter your [facility] Access Code exactly as it appears below. You will not need to use this code after youve completed the sign-up process. If you do not sign up before the expiration date, you must request a new code. [facility] Access Code: Activation code not generated  Current [facility] Status: Active (This is the date your [facility] access code will )    4. Enter the last four digits of your Social Security Number (xxxx) and Date of Birth (mm/dd/yyyy) as indicated and click Submit. You will be taken to the next sign-up page. 5. Create a [facility] ID. This will be your [facility] login ID and cannot be changed, so think of one that is secure and easy to remember.   6. Create a [facility] password. You can change your password at any time. 7. Enter your Password Reset Question and Answer. This can be used at a later time if you forget your password. 8. Enter your e-mail address. You will receive e-mail notification when new information is available in [address] 9. Click Sign Up. You can now view and download portions of your medical record. 10. Click the Download Summary menu link to download a portable copy of your medical information. Additional Information    If you have questions, please visit the Frequently Asked Questions section of the [employer] website at [email or web address] Remember, [facility] is NOT to be used for urgent needs. For medical emergencies, dial 911.

## 2017-03-15 NOTE — PROCEDURES
[facility]    THERMOCOAGULATION OF LUMBAR MEDIAL BRANCH  PROCEDURE REPORT      PATIENT:  Lori Portillo OF BIRTH:  1940  DATE OF SERVICE:  3/15/2017  [facility]    PRE-PROCEDURE DIAGNOSIS:  See Above    POST-PROCEDURE DIAGNOSIS:  See Above    PROCEDURE:      1. Left radiofrequency thermocoagulation of lumbar medial branch nerves, L3/L4, L4/L5, L5/S1 (48153, 64636 x2)  2. Fluoroscopic needle guidance (spinal) (92875)  3. Supervision of moderate sedation (27756)  4. Additional 15 minutes of supervised moderate sedation (03119)    ANESTHESIA:  Local with moderate IV sedation. See Medication Administration Record for specific medications and dosage. COMPLICATIONS: None. PHYSICIAN:  Mary Keith MD    PRE-PROCEDURE NOTE:  Pre-procedural assessment of the patient was performed including a limited history and physical examination. The details of the procedure were discussed with the patient, including the risks, benefits and alternative options and an informed consent was obtained. The patients NPO status, if necessary for the specific procedure and/or administration of moderate intravenous sedation, if utilized, and availability of a responsible adult to escort the patient following the procedure were confirmed. A peripheral intravenous cannula was placed without difficulty and lactated Ringers solution administered. See nursing notes for details. PROCEDURE NOTE:  The patient was brought to the procedure suite and positioned on the fluoroscopy table in the prone position. Physiologic monitors were applied and supplemental oxygen was administered via nasal cannula. The skin was prepped in the standard surgical fashion and sterile drapes were applied over the procedure site.  Please refer to the Flowsheet for documentation of the patients vital signs and the Medication Administration Record for any oral and/or intravenous sedation administered prior to or during the procedure. 1% Lidocaine was utilized for local anesthesia. Under 10-15 degree ipsilateral oblique fluoroscopic guidance a 15cm 18gauge radiofrequency needle with a 10 mm curved active tip was advanced to the junction of the superior articular process and transverse process of each vertebral level immediately inferior to the above-mentioned dorsal rami medial branch nerves. Under AP fluoroscopic guidance, a similar needle was then placed over the superior margin of the sacral ala to thermocoagulate the L5 medial branch nerve. After each individual needle was placed, sensory and motor testing, at 50 Hz and 2 Hz, respectively, were performed which elicited ipsilateral deep local back discomfort without evidence of motor stimulation in the ipsilateral gluteal muscles or extremity. Following this, 1 mL of lidocaine 2% was injected after the negative aspiration of blood, air or CSF. Final correct needle placement was then confirmed by viewing each needle in AP and lateral fluoroscopic views in addition to repeat sensory and motor testing which, again, elicited ipsilateral deep local back discomfort without evidence of motor stimulation in the ipsilateral gluteal muscles or extremity. Medial branch nerve radiofrequency thermocoagulation was then performed at each level for 120 seconds at 80° centigrade x 1 cycle. Following this, 1/3ml  to 1/2ml of a mixture of lidocaine 1% admixed with dexamethasone 2mg [10mg/ml] was injected through each radiofrequency needle after negative aspiration and before removing each needle. Then, all needles were removed intact. The area was thoroughly cleaned and sterile bandages applied as necessary. The patient tolerated the procedure well without complication and the vital signs remained stable throughout the procedure.     POST-PROCEDURE COURSE:   The patient was escorted from the procedure suite in satisfactory condition and recovered per facility protocol based on the type of procedure performed and/or the sedation utilized. The patient did not experience any adverse events and remained hemodynamically stable during the post-procedure period. DISCHARGE NOTE:  Upon discharge, the patient was able to tolerate fluids and was in no acute distress. The patient was oriented to person, place and time and vital signs were stable. Appropriate post-procedure instructions were provided and explained to the patient in detail and all questions were answered.                     Kelley Oconnell MD 3/15/2017 12:37 PM

## 2017-03-16 DIAGNOSIS — I10 ESSENTIAL HYPERTENSION WITH GOAL BLOOD PRESSURE LESS THAN 130/80: ICD-10-CM

## 2017-03-16 RX ORDER — VALSARTAN 160 MG/1
TABLET ORAL
Qty: 180 TAB | Refills: 0 | Status: SHIPPED | OUTPATIENT
Start: 2017-03-16 | End: 2017-04-10 | Stop reason: SDUPTHER

## 2017-03-21 ENCOUNTER — TELEPHONE (OUTPATIENT)
Dept: PAIN MANAGEMENT | Age: 77
End: 2017-03-21

## 2017-03-21 NOTE — TELEPHONE ENCOUNTER
Patient left a voicemail stating that she needed to cancel her upcoming appointment with Dr. Luda Benítez. Called patient to reschedule appointment and confirm cancellation of 3/22 appointment. A voicemail was left informing patient that appointment was cancelled and she can call myself, Bonnie Monson or the  to reschedule her appointment. Direct contact information provided.

## 2017-04-07 RX ORDER — PRAMIPEXOLE DIHYDROCHLORIDE 0.5 MG/1
TABLET ORAL
Qty: 270 TAB | Refills: 0 | Status: SHIPPED | OUTPATIENT
Start: 2017-04-07 | End: 2017-04-28 | Stop reason: SDUPTHER

## 2017-04-10 DIAGNOSIS — I10 ESSENTIAL HYPERTENSION WITH GOAL BLOOD PRESSURE LESS THAN 130/80: ICD-10-CM

## 2017-04-10 RX ORDER — VALSARTAN 160 MG/1
160 TABLET ORAL DAILY
Qty: 180 TAB | Refills: 0 | Status: SHIPPED | OUTPATIENT
Start: 2017-04-10 | End: 2017-04-27 | Stop reason: SDUPTHER

## 2017-04-10 NOTE — TELEPHONE ENCOUNTER
This patient contacted office for the following prescriptions to be filled:    Medication requested :   Requested Prescriptions     Pending Prescriptions Disp Refills    valsartan (DIOVAN) 160 mg tablet 180 Tab 0     PCP: Romain Streeter or Print: [facility]  Mail order or Local pharmacy Mail Order     Scheduled appointment if not seen by current providers in office: [address] [place] [address] 2/1/2017 f/u 5/31/2017

## 2017-04-27 DIAGNOSIS — I10 ESSENTIAL HYPERTENSION WITH GOAL BLOOD PRESSURE LESS THAN 130/80: ICD-10-CM

## 2017-04-27 RX ORDER — VALSARTAN 160 MG/1
160 TABLET ORAL DAILY
Qty: 180 TAB | Refills: 0 | Status: SHIPPED | OUTPATIENT
Start: 2017-04-27 | End: 2017-05-31 | Stop reason: SDUPTHER

## 2017-04-27 NOTE — TELEPHONE ENCOUNTER
This patient contacted office for the following prescriptions to be filled:    Medication requested :   Requested Prescriptions     Pending Prescriptions Disp Refills    valsartan (DIOVAN) 160 mg tablet 180 Tab 0     Sig: Take 1 Tab by mouth daily.      PCP: Romain Streeter or Print: [facility]   Mail order or Local pharmacy Mail Order     Scheduled appointment if not seen by current providers in office: LOV 2/1/2017 f/u 5/31/2017

## 2017-04-28 ENCOUNTER — OFFICE VISIT (OUTPATIENT)
Dept: NEUROLOGY | Age: 77
End: 2017-04-28

## 2017-04-28 VITALS
DIASTOLIC BLOOD PRESSURE: 80 MMHG | SYSTOLIC BLOOD PRESSURE: 160 MMHG | TEMPERATURE: 98.1 F | WEIGHT: 140.2 LBS | HEART RATE: 82 BPM | HEIGHT: 60 IN | OXYGEN SATURATION: 98 % | BODY MASS INDEX: 27.52 KG/M2

## 2017-04-28 DIAGNOSIS — R25.9 MIXED ACTION AND RESTING TREMOR: ICD-10-CM

## 2017-04-28 DIAGNOSIS — I67.9 SMALL VESSEL DISEASE, CEREBROVASCULAR: ICD-10-CM

## 2017-04-28 DIAGNOSIS — I10 ESSENTIAL HYPERTENSION WITH GOAL BLOOD PRESSURE LESS THAN 130/80: ICD-10-CM

## 2017-04-28 DIAGNOSIS — G21.4 VASCULAR PARKINSONISM (HCC): Primary | ICD-10-CM

## 2017-04-28 RX ORDER — PRAMIPEXOLE DIHYDROCHLORIDE 0.5 MG/1
TABLET ORAL
Qty: 270 TAB | Refills: 0 | Status: SHIPPED | OUTPATIENT
Start: 2017-04-28 | End: 2017-07-06 | Stop reason: SDUPTHER

## 2017-04-28 NOTE — PROGRESS NOTES
[facility]             [address] [place] [address], [address] [place] [address]                [facility], [address]    4/28/2017    HPI:  Annalise Liz is a 64 y.o., right handed,   female, who presents with tremor . The patient reports onset of left hand tremor approximately 12 years ago. The right hand started shaking 4-5 years ago. Suleiman Beverage been diabetic since 1993. The tremor is worse at with action, but present at rest relieves with holding the hands. She also notes leg weakness and foot numbness, as well as left-sided sciatica, cramping as been relieved with the use of Neurontin. Tremor is still worse on the left. She denies any shuffling gait, although she does note some stiffness slowness to do back pain. She has no history of strokes. She does have a history of hypertension, hypothyroidism, and diabetes  SHe continues to have tremor worse at rest difficulty walking slowness of gait. She did not tolerate the vitamin B2. She returns today to review test results. Patient is walking better she did have an injection and reports her sciatica on the left is much improved initially s      Patient reports doing well except when very nervous or stressed. She is tolerating the Mirapex at 0.5 mg 3 times daily and only taking that dose. She denies any difficulty with the medication. She has not  fallen  since last visit     She reports doing well except onset of mouth tremor after she read a very upsetting letter. She reports her blood pressures been up at times as well she denies any other new difficulties. Current Outpatient Prescriptions   Medication Sig Dispense Refill    pramipexole (MIRAPEX) 0.5 mg tablet TAKE 1 TABLET THREE TIMES A  Tab 0    valsartan (DIOVAN) 160 mg tablet Take 1 Tab by mouth daily.  180 Tab 0    levothyroxine (SYNTHROID) 88 mcg tablet   2    amLODIPine (NORVASC) 10 mg tablet TAKE 1 TABLET DAILY 90 Tab 0    metOLazone (ZAROXOLYN) 5 mg tablet TAKE 1 TABLET DAILY 90 Tab 0    simvastatin (ZOCOR) 20 mg tablet TAKE 1 TABLET NIGHTLY 90 Tab 0    alendronate (FOSAMAX) 70 mg tablet TAKE 1 TABLET EVERY 7 DAYS 12 Tab 0    esomeprazole (NEXIUM) 40 mg capsule Take 1 Cap by mouth daily. 90 Cap 0    metFORMIN (GLUCOPHAGE) 1,000 mg tablet TAKE 1 TABLET TWICE A DAY WITH MEALS 180 Tab 1    magnesium 250 mg tab Take 250 mg by mouth daily.  predniSONE (DELTASONE) 20 mg tablet Take 20 mg by mouth once as needed for Pain (gout).  gabapentin (NEURONTIN) 300 mg capsule Take 1 Cap by mouth nightly. 30 Cap 0    furosemide (LASIX) 40 mg tablet Take 1 Tab by mouth daily. Takes 40 mg every morning (Patient taking differently: Take 40 mg by mouth daily as needed. Takes 40 mg every morning) 30 Tab 0    tiZANidine (ZANAFLEX) 4 mg capsule Take 4 mg by mouth nightly.  glucose blood VI test strips (ASCENSIA AUTODISC VI, ONE TOUCH ULTRA TEST VI) strip by Does Not Apply route See Admin Instructions. Check 1-2 times a day 100 Strip 6    Lancets misc Precision X-tra. Check 1-2 times a day 100 Each 6    aspirin 81 mg chewable tablet Take 81 mg by mouth daily.  omega-3 fatty acids-vitamin e 1,000 mg cap Take 1 Cap by mouth two (2) times a day. 1,200 mg 2 times daily      cholecalciferol (VITAMIN D3) 1,000 unit cap Take 1,000 Units by mouth daily.  CRANBERRY CONC/ASCORBIC ACID (CRANBERRY PLUS VITAMIN C PO) Take 4,000 mg by mouth daily.  diclofenac (VOLTAREN) 1 % gel Apply  to affected area four (4) times daily. 5 Each 2    levothyroxine (SYNTHROID) 100 mcg tablet Take 1 Tab by mouth Daily (before breakfast). (Patient taking differently: Take 75 mcg by mouth Daily (before breakfast). ) 30 Tab 1    traMADol (ULTRAM) 50 mg tablet Take 50 mg by mouth every eight (8) hours as needed for Pain.          Past Medical History:   Diagnosis Date    Acid reflux     Cancer ([facility]     uterine    Diabetes ([facility]     Dizzy spells     Essential hypertension     Gallbladder attack     Hearing loss     High cholesterol     Hypertension     Memory loss     Sinus complaint     SOB (shortness of breath)     Thyroid disease        Past Surgical History:   Procedure Laterality Date    HX APPENDECTOMY      HX CATARACT REMOVAL      HX CHOLECYSTECTOMY      HX HYSTERECTOMY      partial hysterectomy    HX ORTHOPAEDIC      5 back surgeries    HX ORTHOPAEDIC      hand arm and left knee    NEUROLOGICAL PROCEDURE UNLISTED  1993    Compression Fracture Surgery    NEUROLOGICAL PROCEDURE UNLISTED  1994    NEUROLOGICAL PROCEDURE UNLISTED  1995     Family History   Problem Relation Age of Onset    Cancer Mother 76     Cervical Cancer    Asthma Father     Heart Attack Father     Cancer Maternal Grandmother      colon cancer    Diabetes Maternal Grandmother     Hypertension Maternal Grandmother     Breast Cancer Daughter      two daughters    Diabetes Paternal Grandmother     Heart Disease Paternal Grandmother     Other Maternal Aunt      mental illness due to accident during infancy     Allergies   Allergen Reactions    Codeine Swelling    Codeine Other (comments)     Severe swelling    Feldene [Piroxicam] Swelling     Severe swelling of hands, face and feet.        Review of Systems:   Review of Systems - History obtained from the patient  General ROS: negative  Psychological ROS: positive for - anxiety  ENT ROS: negative  Hematological and Lymphatic ROS: negative  Endocrine ROS: negative  Respiratory ROS: no cough, shortness of breath, or wheezing  Cardiovascular ROS: no chest pain or dyspnea on exertion  Gastrointestinal ROS: no abdominal pain, change in bowel habits, or black or bloody stools  Genito-Urinary ROS: no dysuria, trouble voiding, or hematuria  Musculoskeletal ROS: positive for - gait disturbance, joint pain and pain in back - lower  Neurological ROS: positive for - gait disturbance, numbness/tingling, tremors and weakness  Dermatological ROS: negative    PHYSICAL EXAMINATION: Visit Vitals    /80    Pulse 82    Temp 98.1 °F (36.7 °C) (Oral)    Ht 5' (1.524 m)    Wt 63.6 kg (140 lb 3.2 oz)    SpO2 98%    BMI 27.38 kg/m2     General:  Well defined, nourished, and groomed individual in no acute distress. Neck: Supple, nontender, thyroid within normal limits, no JVD, no bruits, no pain with resistance to active range of motion. Heart: Regular rate and rhythm, no murmurs, rub, or gallop. Normal S1S2. Lungs:  Clear to auscultation bilaterally with equal chest expansion, no cough, no wheeze  Musculoskeletal:  Extremities revealed no edema and had full range of motion of joints. Psych:  Good mood and normal affect    NEUROLOGICAL EXAMINATION:     Mental Status:   Alert and oriented to person, place, and time with recent and remote memory  Fairly intact. Attention span and concentration are normal. Speech is fluent with a full fund of knowledge. Cranial Nerves:    II, III, IV, VI:  Visual acuity grossly intact. Visual fields are normal.    Pupils are equal, round, and reactive to light and accommodation. Extra-ocular movements are full and fluid. no ptosis or nystagmus. V-XII: Hearing is grossly intact. Facial features are symmetric  Motor Examination: Normal tone, bulk, and strength, 5/5 muscle strength throughout except distal ble. No cogwheel rigidity or clonus present. Coordination:  .  Finger to nose and rapid arm movement testing was fairly well done minimal resting worse on left  No significant tremor mouth    Gait and Station:  Steady while walking . No muscle wasting or fasiculations noted. Mri brain images reviewed personally  as showing moderately severe changes consistent with small vessel disease    Vit b12 supra therapeutic  Assessment and Plan:   Candy Ceja is a 64 y.o. right handed female whose history and physical are consistent with mixed action and resting tremor,bradykinessia.   Candy Ceja who has risk factors including hypertension,diabetes,probable diabetic neuropathy,small vessl disease    Marielle Gillette was seen today for tremors. Diagnoses and all orders for this visit:    Vascular parkinsonism ([address]    Mixed action and resting tremor    Small vessel disease, cerebrovascular    Essential hypertension with goal blood pressure less than 130/80    Other orders  -     pramipexole (MIRAPEX) 0.5 mg tablet; TAKE 1 TABLET THREE TIMES A DAY      Follow-up Disposition:  Return in about 3 months (around 7/28/2017). Reviewed work up accomplished ,notes from Dr Linares Getting   Reviewed risks and benefits of mirapex I spent 30  minutes with the patient in face-to-face consultation, of which greater than 50% was spent in counseling and coordination of care as described above.

## 2017-04-28 NOTE — MR AVS SNAPSHOT
Visit Information Date & Time Provider Department Dept. Phone Encounter #  
 4/28/2017  4:00 PM Rosy Almazan  [facility] [address] [address] [id number] Follow-up Instructions Return in about 3 months (around 7/28/2017). Follow-up and Disposition History Your Appointments 5/31/2017  2:45 PM  
ROUTINE CARE with Sameer Rasheed MD  
[facility] ([address]) Appt Note: 3 mo fu; LMOV to reschedule appt /POO 2/17/2017 kjs; .  
 [address] Critical access hospital [address] [place] [address] Drive [address] [facility]  
  
    
 7/28/2017  1:45 PM  
Follow Up with Rosy Almazan MD  
[facility] [facility]) Appt Note: 3mon f/u  
 [address] [address]  
[phone]  
  
   
 [facility] [id number] Upcoming Health Maintenance Date Due MICROALBUMIN Q1 7/13/2017 LIPID PANEL Q1 7/13/2017 FOOT EXAM Q1 7/14/2017 Pneumococcal 65+ Low/Medium Risk (2 of 2 - PPSV23) 7/20/2017 HEMOGLOBIN A1C Q6M 8/1/2017 EYE EXAM RETINAL OR DILATED Q1 10/12/2017 MEDICARE YEARLY EXAM 2/2/2018 GLAUCOMA SCREENING Q2Y 10/12/2018 DTaP/Tdap/Td series (2 - Td) 7/20/2026 Allergies as of 4/28/2017  Review Complete On: 4/28/2017 By: Rita Castellanos LPN Severity Noted Reaction Type Reactions Codeine  06/29/2016    Swelling Codeine  07/01/2016    Other (comments) Severe swelling Feldene [Piroxicam]  06/29/2016    Swelling Severe swelling of hands, face and feet. Current Immunizations  Never Reviewed Name Date Influenza Vaccine 8/1/2016 Not reviewed this visit You Were Diagnosed With   
  
 Codes Comments Vascular parkinsonism ([facility]    -  Primary ICD-10-CM: G21.4 ICD-9-CM: 332.0 Mixed action and resting tremor     ICD-10-CM: R25.9 ICD-9-CM: 781.0 Small vessel disease, cerebrovascular     ICD-10-CM: I67.9 ICD-9-CM: 437.9 Essential hypertension with goal blood pressure less than 130/80     ICD-10-CM: I10 
ICD-9-CM: 401.9 Vitals BP Pulse Temp Height(growth percentile) Weight(growth percentile) SpO2  
 160/80 82 98.1 °F (36.7 °C) (Oral) 5' (1.524 m) 140 lb 3.2 oz (63.6 kg) 98% BMI OB Status Smoking Status 27.38 kg/m2 Hysterectomy Never Smoker BMI and BSA Data Body Mass Index Body Surface Area  
 27.38 kg/m 2 1.64 m 2 Preferred Pharmacy Pharmacy Name Phone [address] [facility] [phone] Your Updated Medication List  
  
   
This list is accurate as of: 4/28/17  4:43 PM.  Always use your most recent med list.  
  
  
  
  
 alendronate 70 mg tablet Commonly known as:  FOSAMAX TAKE 1 TABLET EVERY 7 DAYS  
  
 amLODIPine 10 mg tablet Commonly known as:  Candida Justin TAKE 1 TABLET DAILY  
  
 aspirin 81 mg chewable tablet Take 81 mg by mouth daily. cholecalciferol 1,000 unit Cap Commonly known as:  VITAMIN D3 Take 1,000 Units by mouth daily. CRANBERRY PLUS VITAMIN C PO Take 4,000 mg by mouth daily. diclofenac 1 % Gel Commonly known as:  VOLTAREN Apply  to affected area four (4) times daily. esomeprazole 40 mg capsule Commonly known as:  NexIUM Take 1 Cap by mouth daily. furosemide 40 mg tablet Commonly known as:  LASIX Take 1 Tab by mouth daily. Takes 40 mg every morning  
  
 gabapentin 300 mg capsule Commonly known as:  NEURONTIN Take 1 Cap by mouth nightly. glucose blood VI test strips strip Commonly known as:  ASCENSIA AUTODISC VI, ONE TOUCH ULTRA TEST VI  
by Does Not Apply route See Admin Instructions. Check 1-2 times a day Lancets Misc Precision X-tra. Check 1-2 times a day * levothyroxine 100 mcg tablet Commonly known as:  SYNTHROID  
 Take 1 Tab by mouth Daily (before breakfast). * levothyroxine 88 mcg tablet Commonly known as:  SYNTHROID  
  
 magnesium 250 mg Tab Take 250 mg by mouth daily. metFORMIN 1,000 mg tablet Commonly known as:  GLUCOPHAGE  
TAKE 1 TABLET TWICE A DAY WITH MEALS  
  
 metOLazone 5 mg tablet Commonly known as:  ZAROXOLYN  
TAKE 1 TABLET DAILY  
  
 omega-3 fatty acids-vitamin e 1,000 mg Cap Take 1 Cap by mouth two (2) times a day. 1,200 mg 2 times daily  
  
 pramipexole 0.5 mg tablet Commonly known as:  MIRAPEX TAKE 1 TABLET THREE TIMES A DAY  
  
 predniSONE 20 mg tablet Commonly known as:  Fabiola Schilder Take 20 mg by mouth once as needed for Pain (gout). simvastatin 20 mg tablet Commonly known as:  ZOCOR  
TAKE 1 TABLET NIGHTLY  
  
 tiZANidine 4 mg capsule Commonly known as:  Leon Crew Take 4 mg by mouth nightly. traMADol 50 mg tablet Commonly known as:  ULTRAM  
Take 50 mg by mouth every eight (8) hours as needed for Pain.  
  
 valsartan 160 mg tablet Commonly known as:  DIOVAN Take 1 Tab by mouth daily. * Notice: This list has 2 medication(s) that are the same as other medications prescribed for you. Read the directions carefully, and ask your doctor or other care provider to review them with you. Prescriptions Sent to Pharmacy Refills  
 pramipexole (MIRAPEX) 0.5 mg tablet 0 Sig: TAKE 1 TABLET THREE TIMES A DAY Class: Normal  
 Pharmacy: [address], [address] [place] [address] #: [phone] Follow-up Instructions Return in about 3 months (around 7/28/2017). Introducing [facility]! Dear Dwain Gip: Thank you for requesting a [employer] account. Our records indicate that you already have an active [employer] account. You can access your account anytime at [email or web address]. [email or web address] Did you know that you can access your hospital and ER discharge instructions at any time in [employer]? You can also review all of your test results from your hospital stay or ER visit. Additional Information If you have questions, please visit the Frequently Asked Questions section of the [employer] website at [email or web address] Remember, [employer] is NOT to be used for urgent needs. For medical emergencies, dial 911. Now available from your iPhone and Android! Please provide this summary of care documentation to your next provider. Your primary care clinician is listed as Teresa Jordan. If you have any questions after today's visit, please call [phone].

## 2017-05-04 DIAGNOSIS — R25.1 TREMOR OF BOTH HANDS: ICD-10-CM

## 2017-05-04 DIAGNOSIS — R79.89 LOW TSH LEVEL: ICD-10-CM

## 2017-05-04 DIAGNOSIS — E03.9 HYPOTHYROIDISM, UNSPECIFIED TYPE: ICD-10-CM

## 2017-05-04 DIAGNOSIS — E11.9 WELL CONTROLLED DIABETES MELLITUS (HCC): Primary | ICD-10-CM

## 2017-05-06 DIAGNOSIS — K21.9 GASTROESOPHAGEAL REFLUX DISEASE WITHOUT ESOPHAGITIS: ICD-10-CM

## 2017-05-08 RX ORDER — ESOMEPRAZOLE MAGNESIUM 40 MG/1
CAPSULE, DELAYED RELEASE ORAL
Qty: 90 CAP | Refills: 0 | Status: SHIPPED | OUTPATIENT
Start: 2017-05-08 | End: 2017-11-07 | Stop reason: ALTCHOICE

## 2017-05-11 DIAGNOSIS — E11.9 DIABETES MELLITUS TYPE 2, CONTROLLED (HCC): ICD-10-CM

## 2017-05-11 RX ORDER — METFORMIN HYDROCHLORIDE 1000 MG/1
TABLET ORAL
Qty: 180 TAB | Refills: 0 | Status: SHIPPED | OUTPATIENT
Start: 2017-05-11 | End: 2017-08-09 | Stop reason: SDUPTHER

## 2017-05-20 DIAGNOSIS — Z87.39 H/O OSTEOPENIA: ICD-10-CM

## 2017-05-20 DIAGNOSIS — M79.18 MYOFASCIAL PAIN: ICD-10-CM

## 2017-05-20 DIAGNOSIS — M47.899 FACET SYNDROME: ICD-10-CM

## 2017-05-22 RX ORDER — ALENDRONATE SODIUM 70 MG/1
TABLET ORAL
Qty: 12 TAB | Refills: 0 | Status: SHIPPED | COMMUNITY
Start: 2017-05-22 | End: 2017-08-14 | Stop reason: SDUPTHER

## 2017-05-31 ENCOUNTER — OFFICE VISIT (OUTPATIENT)
Dept: FAMILY MEDICINE CLINIC | Age: 77
End: 2017-05-31

## 2017-05-31 VITALS
BODY MASS INDEX: 27.09 KG/M2 | SYSTOLIC BLOOD PRESSURE: 114 MMHG | HEIGHT: 60 IN | DIASTOLIC BLOOD PRESSURE: 50 MMHG | RESPIRATION RATE: 16 BRPM | TEMPERATURE: 98.3 F | WEIGHT: 138 LBS | OXYGEN SATURATION: 98 % | HEART RATE: 67 BPM

## 2017-05-31 DIAGNOSIS — E03.9 HYPOTHYROIDISM, UNSPECIFIED TYPE: ICD-10-CM

## 2017-05-31 DIAGNOSIS — R06.83 SNORING: ICD-10-CM

## 2017-05-31 DIAGNOSIS — R25.1 TREMOR OF BOTH HANDS: ICD-10-CM

## 2017-05-31 DIAGNOSIS — R40.0 DAYTIME SLEEPINESS: ICD-10-CM

## 2017-05-31 DIAGNOSIS — R53.83 FATIGUE, UNSPECIFIED TYPE: ICD-10-CM

## 2017-05-31 DIAGNOSIS — R05.8 DRY COUGH: ICD-10-CM

## 2017-05-31 DIAGNOSIS — M79.89 LEG SWELLING: ICD-10-CM

## 2017-05-31 DIAGNOSIS — I10 ESSENTIAL HYPERTENSION WITH GOAL BLOOD PRESSURE LESS THAN 130/80: Primary | ICD-10-CM

## 2017-05-31 RX ORDER — LEVOTHYROXINE SODIUM 112 UG/1
135 TABLET ORAL
COMMUNITY
End: 2019-02-08

## 2017-05-31 RX ORDER — VALSARTAN 160 MG/1
160 TABLET ORAL DAILY
Qty: 90 TAB | Refills: 0 | Status: SHIPPED | OUTPATIENT
Start: 2017-05-31 | End: 2017-10-12 | Stop reason: SDUPTHER

## 2017-05-31 RX ORDER — AMLODIPINE BESYLATE 5 MG/1
5 TABLET ORAL DAILY
Qty: 30 TAB | Refills: 1 | Status: SHIPPED | OUTPATIENT
Start: 2017-05-31 | End: 2017-08-13 | Stop reason: SDUPTHER

## 2017-05-31 RX ORDER — METOLAZONE 5 MG/1
TABLET ORAL
Qty: 90 TAB | Refills: 0 | Status: CANCELLED | OUTPATIENT
Start: 2017-05-31

## 2017-05-31 RX ORDER — SIMVASTATIN 20 MG/1
20 TABLET, FILM COATED ORAL
Qty: 90 TAB | Refills: 0 | Status: SHIPPED | OUTPATIENT
Start: 2017-05-31 | End: 2017-08-30 | Stop reason: SDUPTHER

## 2017-05-31 NOTE — PROGRESS NOTES
1. Have you been to the ER, urgent care clinic since your last visit? Hospitalized since your last visit? No    2. Have you seen or consulted any other health care providers outside of the [address] [place] [address] since your last visit? Include any pap smears or colon screening. Yes Dr. Alex Ge, LOV: two weeks ago. Patient states she sleeps all the time; wants to discuss. Patient states [facility] will be switching to all generic medications only; otherwise meds will need pre-approval and will be more expensive. Patient requests generic for Nexium - needs omeprazole Rx. Also, needs refill on Valsartan but needs to have Rx changed to \"1 tablet twice daily\". All Rx refills need to be 90 day supply.

## 2017-05-31 NOTE — MR AVS SNAPSHOT
Visit Information Date & Time Provider Department Dept. Phone Encounter #  
 5/31/2017  2:45 PM Qi Salinas, [address] [id number] Follow-up Instructions Return in about 1 month (around 6/30/2017). Your Appointments 7/28/2017  1:45 PM  
Follow Up with Brenda Brady MD  
[address]) Appt Note: 3mon f/u  
 [address] [place] [address] [place] [address] [phone]  
  
   
 [place] [id number] Upcoming Health Maintenance Date Due MICROALBUMIN Q1 7/13/2017 LIPID PANEL Q1 7/13/2017 FOOT EXAM Q1 7/14/2017 Pneumococcal 65+ Low/Medium Risk (2 of 2 - PPSV23) 7/20/2017 HEMOGLOBIN A1C Q6M 8/1/2017 INFLUENZA AGE 9 TO ADULT 8/1/2017 EYE EXAM RETINAL OR DILATED Q1 10/12/2017 MEDICARE YEARLY EXAM 2/2/2018 GLAUCOMA SCREENING Q2Y 10/12/2018 DTaP/Tdap/Td series (2 - Td) 7/20/2026 Allergies as of 5/31/2017  Review Complete On: 5/31/2017 By: Qi Salinas MD  
  
 Severity Noted Reaction Type Reactions Codeine  06/29/2016    Swelling Codeine  07/01/2016    Other (comments) Severe swelling Feldene [Piroxicam]  06/29/2016    Swelling Severe swelling of hands, face and feet. Current Immunizations  Never Reviewed Name Date Influenza Vaccine 8/1/2016 Not reviewed this visit You Were Diagnosed With   
  
 Codes Comments Essential hypertension with goal blood pressure less than 130/80    -  Primary ICD-10-CM: I10 
ICD-9-CM: 401.9 Leg swelling     ICD-10-CM: M79.89 ICD-9-CM: 729.81 Fatigue, unspecified type     ICD-10-CM: R53.83 ICD-9-CM: 780.79 Hypothyroidism, unspecified type     ICD-10-CM: E03.9 ICD-9-CM: 244.9 Tremor of both hands     ICD-10-CM: R25.1 ICD-9-CM: 781.0 Daytime sleepiness     ICD-10-CM: R40.0 ICD-9-CM: 780.54  Snoring     ICD-10-CM: R06.83 
ICD-9-CM: 786.09   
 Dry cough     ICD-10-CM: R05 ICD-9-CM: 162. 2 Vitals BP Pulse Temp Resp Height(growth percentile) Weight(growth percentile) 114/50 (BP 1 Location: Left arm, BP Patient Position: Sitting) 67 98.3 °F (36.8 °C) (Oral) 16 5' (1.524 m) 138 lb (62.6 kg) SpO2 BMI OB Status Smoking Status 98% 26.95 kg/m2 Hysterectomy Never Smoker BMI and BSA Data Body Mass Index Body Surface Area  
 26.95 kg/m 2 1.63 m 2 Preferred Pharmacy Pharmacy Name Phone [address] [facility] [phone] Your Updated Medication List  
  
   
This list is accurate as of: 5/31/17  4:04 PM.  Always use your most recent med list.  
  
  
  
  
 alendronate 70 mg tablet Commonly known as:  FOSAMAX TAKE 1 TABLET EVERY 7 DAYS  
  
 amLODIPine 5 mg tablet Commonly known as:  Delphina Peat Take 1 Tab by mouth daily. aspirin 81 mg chewable tablet Take 81 mg by mouth daily. cholecalciferol 1,000 unit Cap Commonly known as:  VITAMIN D3 Take 1,000 Units by mouth daily. CRANBERRY PLUS VITAMIN C PO Take 4,000 mg by mouth daily. diclofenac 1 % Gel Commonly known as:  VOLTAREN Apply  to affected area four (4) times daily. furosemide 40 mg tablet Commonly known as:  LASIX Take 1 Tab by mouth daily. Takes 40 mg every morning  
  
 gabapentin 300 mg capsule Commonly known as:  NEURONTIN Take 1 Cap by mouth nightly. glucose blood VI test strips strip Commonly known as:  ASCENSIA AUTODISC VI, ONE TOUCH ULTRA TEST VI  
by Does Not Apply route See Admin Instructions. Check 1-2 times a day Lancets Misc Precision X-tra. Check 1-2 times a day  
  
 levothyroxine 112 mcg tablet Commonly known as:  SYNTHROID Take 112 mcg by mouth Daily (before breakfast). magnesium 250 mg Tab Take 250 mg by mouth daily. metFORMIN 1,000 mg tablet Commonly known as:  GLUCOPHAGE  
 TAKE 1 TABLET TWICE A DAY WITH MEALS NexIUM 40 mg capsule Generic drug:  esomeprazole TAKE 1 CAPSULE DAILY  
  
 omega-3 fatty acids-vitamin e 1,000 mg Cap Take 1 Cap by mouth two (2) times a day. 1,200 mg 2 times daily  
  
 pramipexole 0.5 mg tablet Commonly known as:  MIRAPEX TAKE 1 TABLET THREE TIMES A DAY  
  
 predniSONE 20 mg tablet Commonly known as:  Newton Gang Take 20 mg by mouth once as needed for Pain (gout). simvastatin 20 mg tablet Commonly known as:  ZOCOR Take 1 Tab by mouth nightly. tiZANidine 4 mg capsule Commonly known as:  Dick Heading Take 4 mg by mouth nightly. valsartan 160 mg tablet Commonly known as:  DIOVAN Take 1 Tab by mouth daily. Prescriptions Sent to Pharmacy Refills  
 valsartan (DIOVAN) 160 mg tablet 0 Sig: Take 1 Tab by mouth daily. Class: Normal  
 Pharmacy: [address], [address] Ph #: [phone] Route: Oral  
 amLODIPine (NORVASC) 5 mg tablet 1 Sig: Take 1 Tab by mouth daily. Class: Normal  
 Pharmacy: [address], [address] Ph #: [phone] Route: Oral  
 simvastatin (ZOCOR) 20 mg tablet 0 Sig: Take 1 Tab by mouth nightly. Class: Normal  
 Pharmacy: [address], [address] Ph #: [phone] Route: Oral  
  
We Performed the Following REFERRAL TO SLEEP STUDIES [REF99 Custom] Comments:  
 Please evaluate patient for daytime sleepiness, snoring. Follow-up Instructions Return in about 1 month (around 6/30/2017). To-Do List   
 05/31/2017 Imaging:  XR CHEST PA LAT Referral Information Referral ID Referred By Referred To  
  
 [id number] [facility] Not Available Visits Status Start Date End Date 1 New Request 5/31/17 5/31/18  If your referral has a status of pending review or denied, additional information will be sent to support the outcome of this decision. Patient Instructions Leg and Ankle Edema: Care Instructions Your Care Instructions Swelling in the legs, ankles, and feet is called edema. It is common after you sit or stand for a while. Long plane flights or car rides often cause swelling in the legs and feet. You may also have swelling if you have to stand for long periods of time at your job. Problems with the veins in the legs (varicose veins) and changes in hormones can also cause swelling. Sometimes the swelling in the ankles and feet is caused by a more serious problem, such as heart failure, infection, blood clots, or liver or kidney disease. Follow-up care is a key part of your treatment and safety. Be sure to make and go to all appointments, and call your doctor if you are having problems. Its also a good idea to know your test results and keep a list of the medicines you take. How can you care for yourself at home? · If your doctor gave you medicine, take it as prescribed. Call your doctor if you think you are having a problem with your medicine. · Whenever you are resting, raise your legs up. Try to keep the swollen area higher than the level of your heart. · Take breaks from standing or sitting in one position. ¨ Walk around to increase the blood flow in your lower legs. ¨ Move your feet and ankles often while you stand, or tighten and relax your leg muscles. · Wear support stockings. Put them on in the morning, before swelling gets worse. · Eat a balanced diet. Lose weight if you need to. · Limit the amount of salt (sodium) in your diet. Salt holds fluid in the body and may increase swelling. When should you call for help? Call 911 anytime you think you may need emergency care. For example, call if: 
· You have symptoms of a blood clot in your lung (called a pulmonary embolism). These may include: 
¨ Sudden chest pain. ¨ Trouble breathing. ¨ Coughing up blood. Call your doctor now or seek immediate medical care if: 
· You have signs of a blood clot, such as: 
¨ Pain in your calf, back of the knee, thigh, or groin. ¨ Redness and swelling in your leg or groin. · You have symptoms of infection, such as: 
¨ Increased pain, swelling, warmth, or redness. ¨ Red streaks or pus. ¨ A fever. Watch closely for changes in your health, and be sure to contact your doctor if: 
· Your swelling is getting worse. · You have new or worsening pain in your legs. · You do not get better as expected. Where can you learn more? Go to http://nathalia-kam.info/. Enter R052 in the search box to learn more about \"Leg and Ankle Edema: Care Instructions. \" Current as of: May 27, 2016 Content Version: 11.2 © 9258-2669 USB [employer]. Care instructions adapted under license by [employer] (which disclaims liability or warranty for this information). If you have questions about a medical condition or this instruction, always ask your healthcare professional. [place] [address] any warranty or liability for your use of this information. Low Sodium Diet (2,000 Milligram): Care Instructions Your Care Instructions Too much sodium causes your body to hold on to extra water. This can raise your blood pressure and force your heart and kidneys to work harder. In very serious cases, this could cause you to be put in the hospital. It might even be life-threatening. By limiting sodium, you will feel better and lower your risk of serious problems. The most common source of sodium is salt. People get most of the salt in their diet from canned, prepared, and packaged foods. Fast food and restaurant meals also are very high in sodium. Your doctor will probably limit your sodium to less than 2,000 milligrams (mg) a day. This limit counts all the sodium in prepared and packaged foods and any salt you add to your food. Follow-up care is a key part of your treatment and safety. Be sure to make and go to all appointments, and call your doctor if you are having problems. It's also a good idea to know your test results and keep a list of the medicines you take. How can you care for yourself at home? Read food labels · Read labels on cans and food packages. The labels tell you how much sodium is in each serving. Make sure that you look at the serving size. If you eat more than the serving size, you have eaten more sodium. · Food labels also tell you the Percent Daily Value for sodium. Choose products with low Percent Daily Values for sodium. · Be aware that sodium can come in forms other than salt, including monosodium glutamate (MSG), sodium citrate, and sodium bicarbonate (baking soda). MSG is often added to Asian food. When you eat out, you can sometimes ask for food without MSG or added salt. Buy low-sodium foods · Buy foods that are labeled \"unsalted\" (no salt added), \"sodium-free\" (less than 5 mg of sodium per serving), or \"low-sodium\" (less than 140 mg of sodium per serving). Foods labeled \"reduced-sodium\" and \"light sodium\" may still have too much sodium. Be sure to read the label to see how much sodium you are getting. · Buy fresh vegetables, or frozen vegetables without added sauces. Buy low-sodium versions of canned vegetables, soups, and other canned goods. Prepare low-sodium meals · Cut back on the amount of salt you use in cooking. This will help you adjust to the taste. Do not add salt after cooking. One teaspoon of salt has about 2,300 mg of sodium. · Take the salt shaker off the table. · Flavor your food with garlic, lemon juice, onion, vinegar, herbs, and spices. Do not use soy sauce, lite soy sauce, steak sauce, onion salt, garlic salt, celery salt, mustard, or ketchup on your food. · Use low-sodium salad dressings, sauces, and ketchup. Or make your own salad dressings and sauces without adding salt. · Use less salt (or none) when recipes call for it. You can often use half the salt a recipe calls for without losing flavor. Other foods such as rice, pasta, and grains do not need added salt. · Rinse canned vegetables, and cook them in fresh water. This removes somebut not allof the salt. · Avoid water that is naturally high in sodium or that has been treated with water softeners, which add sodium. Call your local water company to find out the sodium content of your water supply. If you buy bottled water, read the label and choose a sodium-free brand. Avoid high-sodium foods · Avoid eating: ¨ Smoked, cured, salted, and canned meat, fish, and poultry. ¨ Ham, peterson, hot dogs, and luncheon meats. ¨ Regular, hard, and processed cheese and regular peanut butter. ¨ Crackers with salted tops, and other salted snack foods such as pretzels, chips, and salted popcorn. ¨ Frozen prepared meals, unless labeled low-sodium. ¨ Canned and dried soups, broths, and bouillon, unless labeled sodium-free or low-sodium. ¨ Canned vegetables, unless labeled sodium-free or low-sodium. ¨ Western Keli fries, pizza, tacos, and other fast foods. ¨ Pickles, olives, ketchup, and other condiments, especially soy sauce, unless labeled sodium-free or low-sodium. Where can you learn more? Go to http://nathalia-kam.info/. Enter T317 in the search box to learn more about \"Low Sodium Diet (2,000 Milligram): Care Instructions. \" Current as of: July 26, 2016 Content Version: 11.2 © 6726-3798 Open[employer]ic. Care instructions adapted under license by [employer] (which disclaims liability or warranty for this information). If you have questions about a medical condition or this instruction, always ask your healthcare professional. [address] any warranty or liability for your use of this information. Sleep Apnea: Care Instructions Your Care Instructions Sleep apnea means that you frequently stop breathing for 10 seconds or longer during sleep. It can be mild to severe, based on the number of times an hour that you stop breathing or have slowed breathing. Blocked or narrowed airways in your nose, mouth, or throat can cause sleep apnea. Your airway can become blocked when your throat muscles and tongue relax during sleep. You can treat sleep apnea at home by making lifestyle changes. You also can use a CPAP breathing machine that keeps tissues in the throat from blocking your airway. Or your doctor may suggest that you use a breathing device while you sleep. It helps keep your airway open. This could be a device that you put in your mouth. Other examples include strips or disks that you use on your nose. In some cases, surgery may be needed to remove enlarged tissues in the throat. Follow-up care is a key part of your treatment and safety. Be sure to make and go to all appointments, and call your doctor if you are having problems. It's also a good idea to know your test results and keep a list of the medicines you take. How can you care for yourself at home? · Lose weight, if needed. It may reduce the number of times you stop breathing or have slowed breathing. · Sleep on your side. It may stop mild apnea. If you tend to roll onto your back, sew a pocket in the back of your pajama top. Put a tennis ball into the pocket, and stitch the pocket shut. This will help keep you from sleeping on your back. · Avoid alcohol and medicines such as sleeping pills and sedatives before bed. · Do not smoke. Smoking can make sleep apnea worse. If you need help quitting, talk to your doctor about stop-smoking programs and medicines. These can increase your chances of quitting for good. · Prop up the head of your bed 4 to 6 inches by putting bricks under the legs of the bed. · Treat breathing problems, such as a stuffy nose, caused by a cold or allergies. · Try a continuous positive airway pressure (CPAP) breathing machine if your doctor recommends it. The machine keeps your airway open when you sleep. · If CPAP does not work for you, ask your doctor if you can try other breathing machines. A bilevel positive airway pressure machine uses one type of air pressure for breathing in and another type for breathing out. Another device raises or lowers air pressure as needed while you breathe. · Talk to your doctor if: 
¨ Your nose feels dry or bleeds when you use one of these machines. You may need to increase moisture in the air. A humidifier may help. ¨ Your nose is runny or stuffy from using a breathing machine. Decongestants or a corticosteroid nasal spray may help. ¨ You are sleepy during the day and it gets in the way of the normal things you do. Do not drive when you are drowsy. When should you call for help? Watch closely for changes in your health, and be sure to contact your doctor if: 
· You still have sleep apnea even though you have made lifestyle changes. · You are thinking of trying a device such as CPAP. · You are having problems using a CPAP or similar machine. Where can you learn more? Go to http://nathalia-kam.info/. Enter W664 in the search box to learn more about \"Sleep Apnea: Care Instructions. \" Current as of: May 23, 2016 Content Version: 11.2 © 8626-7489 [employer]Comunity. Care instructions adapted under license by [employer] (which disclaims liability or warranty for this information). If you have questions about a medical condition or this instruction, always ask your healthcare professional. [place] [address] any warranty or liability for your use of this information. Parkinson's Disease: Care Instructions Your Care Instructions Parkinson's disease can cause tremors, stiffness, and problems with movement. Severe or advanced cases can also cause problems with thinking. In Parkinson's disease, part of the brain cannot make enough dopamine, a chemical that helps control movement. Taking your medicines correctly and getting regular exercise may help you maintain your quality of life. There are many things that can cause Parkinson's disease symptoms, including some medicine, some toxins, and trauma to the head. The cause in most cases is not known. Follow-up care is a key part of your treatment and safety. Be sure to make and go to all appointments, and call your doctor if you are having problems. Its also a good idea to know your test results and keep a list of the medicines you take. How can you care for yourself at home? General care · Take your medicines exactly as prescribed. Call your doctor if you think you are having a problem with your medicine. · Make sure your home is safe: 
¨ Place furniture so that you have something to hold on to as you walk around the house. ¨ Use chairs that make it easier to sit down and stand up. ¨ Group the things you use most, such as reading glasses, keys, and the telephone, in one easy-to-reach place. ¨ Tack down rugs so that you do not trip. ¨ Put no-slip tape and handrails in the tub to prevent falls. · Use a cane, walker, or scooter if your doctor suggests it. · Keep up your normal activities as much as you can. · Find ways to manage stress, which can make symptoms worse. · Spend time with family and friends. Join a support group for people with Parkinson's disease if you want extra help. · Depression is common with this condition. Tell your doctor if you have trouble sleeping, are eating too much or are not hungry, or feel sad or tearful all the time. Depression can be treated with medicine and counseling. Diet and exercise · Eat a balanced diet. · If you are taking levodopa, do not eat protein at the same time you take your medicine.  Levodopa may not work as well if you take it at the same time you eat protein. You can eat normal amounts of protein. Talk to your doctor if you have questions. · If you have problems swallowing, change how and what you eat: ¨ Try thick drinks, such as milk shakes. They are easier to swallow than other fluids. ¨ Do not eat foods that crumble easily. These can cause choking. ¨ Use a  to prepare food. Soft foods need less chewing. ¨ Eat small meals often so that you do not get tired from eating heavy meals. · Drink plenty of water and eat a high-fiber diet to prevent constipation. Parkinson'sand the medicines that treat itmay slow your intestines. · Get exercise on most days. Work with your doctor to set up a program of walking, swimming, or other exercise you are able to do. When should you call for help? Call your doctor now or seek immediate medical care if: 
· You have a change in your symptoms. · You develop other problems from your condition, such as: ¨ Injury from a fall. ¨ Thinking or memory problems. ¨ A urinary tract infection (burning pain when urinating). Watch closely for changes in your health, and be sure to contact your doctor if: 
· You lose weight because of problems with eating. · You want more information about your condition or your medicines. Where can you learn more? Go to http://nathalia-kam.info/. Enter U648 in the search box to learn more about \"Parkinson's Disease: Care Instructions. \" Current as of: October 14, 2016 Content Version: 11.2 © 5665-4786 Color Promos. Care instructions adapted under license by [employer] (which disclaims liability or warranty for this information). If you have questions about a medical condition or this instruction, always ask your healthcare professional. [place] [address] any warranty or liability for your use of this information. Introducing [facility]! Dear Trista Saxena: Thank you for requesting a [employer] account. Our records indicate that you already have an active [employer] account. You can access your account anytime at [email or web address] Did you know that you can access your hospital and ER discharge instructions at any time in [employer] You can also review all of your test results from your hospital stay or ER visit. Additional Information If you have questions, please visit the Frequently Asked Questions section of the [employer] website at [email or web address] Remember, [employer] is NOT to be used for urgent needs. For medical emergencies, dial 911. Now available from your iPhone and Android! Please provide this summary of care documentation to your next provider. Your primary care clinician is listed as Danny Sheets. If you have any questions after today's visit, please call [phone].

## 2017-05-31 NOTE — PATIENT INSTRUCTIONS
Leg and Ankle Edema: Care Instructions  Your Care Instructions  Swelling in the legs, ankles, and feet is called edema. It is common after you sit or stand for a while. Long plane flights or car rides often cause swelling in the legs and feet. You may also have swelling if you have to stand for long periods of time at your job. Problems with the veins in the legs (varicose veins) and changes in hormones can also cause swelling. Sometimes the swelling in the ankles and feet is caused by a more serious problem, such as heart failure, infection, blood clots, or liver or kidney disease. Follow-up care is a key part of your treatment and safety. Be sure to make and go to all appointments, and call your doctor if you are having problems. Its also a good idea to know your test results and keep a list of the medicines you take. How can you care for yourself at home? · If your doctor gave you medicine, take it as prescribed. Call your doctor if you think you are having a problem with your medicine. · Whenever you are resting, raise your legs up. Try to keep the swollen area higher than the level of your heart. · Take breaks from standing or sitting in one position. ¨ Walk around to increase the blood flow in your lower legs. ¨ Move your feet and ankles often while you stand, or tighten and relax your leg muscles. · Wear support stockings. Put them on in the morning, before swelling gets worse. · Eat a balanced diet. Lose weight if you need to. · Limit the amount of salt (sodium) in your diet. Salt holds fluid in the body and may increase swelling. When should you call for help? Call 911 anytime you think you may need emergency care. For example, call if:  · You have symptoms of a blood clot in your lung (called a pulmonary embolism). These may include:  ¨ Sudden chest pain. ¨ Trouble breathing. ¨ Coughing up blood.   Call your doctor now or seek immediate medical care if:  · You have signs of a blood clot, such as:  ¨ Pain in your calf, back of the knee, thigh, or groin. ¨ Redness and swelling in your leg or groin. · You have symptoms of infection, such as:  ¨ Increased pain, swelling, warmth, or redness. ¨ Red streaks or pus. ¨ A fever. Watch closely for changes in your health, and be sure to contact your doctor if:  · Your swelling is getting worse. · You have new or worsening pain in your legs. · You do not get better as expected. Where can you learn more? Go to http://nathalia-kam.info/. Enter A844 in the search box to learn more about \"Leg and Ankle Edema: Care Instructions. \"  Current as of: May 27, 2016  Content Version: 11.2  © 6589-6968 [employer]. Care instructions adapted under license by [employer] (which disclaims liability or warranty for this information). If you have questions about a medical condition or this instruction, always ask your healthcare professional. [place] [address] any warranty or liability for your use of this information. Low Sodium Diet (2,000 Milligram): Care Instructions  Your Care Instructions  Too much sodium causes your body to hold on to extra water. This can raise your blood pressure and force your heart and kidneys to work harder. In very serious cases, this could cause you to be put in the hospital. It might even be life-threatening. By limiting sodium, you will feel better and lower your risk of serious problems. The most common source of sodium is salt. People get most of the salt in their diet from canned, prepared, and packaged foods. Fast food and restaurant meals also are very high in sodium. Your doctor will probably limit your sodium to less than 2,000 milligrams (mg) a day. This limit counts all the sodium in prepared and packaged foods and any salt you add to your food. Follow-up care is a key part of your treatment and safety.  Be sure to make and go to all appointments, and call your doctor if you are having problems. It's also a good idea to know your test results and keep a list of the medicines you take. How can you care for yourself at home? Read food labels  · Read labels on cans and food packages. The labels tell you how much sodium is in each serving. Make sure that you look at the serving size. If you eat more than the serving size, you have eaten more sodium. · Food labels also tell you the Percent Daily Value for sodium. Choose products with low Percent Daily Values for sodium. · Be aware that sodium can come in forms other than salt, including monosodium glutamate (MSG), sodium citrate, and sodium bicarbonate (baking soda). MSG is often added to Asian food. When you eat out, you can sometimes ask for food without MSG or added salt. Buy low-sodium foods  · Buy foods that are labeled \"unsalted\" (no salt added), \"sodium-free\" (less than 5 mg of sodium per serving), or \"low-sodium\" (less than 140 mg of sodium per serving). Foods labeled \"reduced-sodium\" and \"light sodium\" may still have too much sodium. Be sure to read the label to see how much sodium you are getting. · Buy fresh vegetables, or frozen vegetables without added sauces. Buy low-sodium versions of canned vegetables, soups, and other canned goods. Prepare low-sodium meals  · Cut back on the amount of salt you use in cooking. This will help you adjust to the taste. Do not add salt after cooking. One teaspoon of salt has about 2,300 mg of sodium. · Take the salt shaker off the table. · Flavor your food with garlic, lemon juice, onion, vinegar, herbs, and spices. Do not use soy sauce, lite soy sauce, steak sauce, onion salt, garlic salt, celery salt, mustard, or ketchup on your food. · Use low-sodium salad dressings, sauces, and ketchup. Or make your own salad dressings and sauces without adding salt. · Use less salt (or none) when recipes call for it.  You can often use half the salt a recipe calls for without losing flavor. Other foods such as rice, pasta, and grains do not need added salt. · Rinse canned vegetables, and cook them in fresh water. This removes some--but not all--of the salt. · Avoid water that is naturally high in sodium or that has been treated with water softeners, which add sodium. Call your local water company to find out the sodium content of your water supply. If you buy bottled water, read the label and choose a sodium-free brand. Avoid high-sodium foods  · Avoid eating:  ¨ Smoked, cured, salted, and canned meat, fish, and poultry. ¨ Ham, peterson, hot dogs, and luncheon meats. ¨ Regular, hard, and processed cheese and regular peanut butter. ¨ Crackers with salted tops, and other salted snack foods such as pretzels, chips, and salted popcorn. ¨ Frozen prepared meals, unless labeled low-sodium. ¨ Canned and dried soups, broths, and bouillon, unless labeled sodium-free or low-sodium. ¨ Canned vegetables, unless labeled sodium-free or low-sodium. ¨ Western Keli fries, pizza, tacos, and other fast foods. ¨ Pickles, olives, ketchup, and other condiments, especially soy sauce, unless labeled sodium-free or low-sodium. Where can you learn more? Go to http://nathalia-kam.info/. Enter Y642 in the search box to learn more about \"Low Sodium Diet (2,000 Milligram): Care Instructions. \"  Current as of: July 26, 2016  Content Version: 11.2  © 6229-2395 [employer]. Care instructions adapted under license by [employer] (which disclaims liability or warranty for this information). If you have questions about a medical condition or this instruction, always ask your healthcare professional. [place] [address] any warranty or liability for your use of this information. Sleep Apnea: Care Instructions  Your Care Instructions  Sleep apnea means that you frequently stop breathing for 10 seconds or longer during sleep.  It can be mild to severe, based on the number of times an hour that you stop breathing or have slowed breathing. Blocked or narrowed airways in your nose, mouth, or throat can cause sleep apnea. Your airway can become blocked when your throat muscles and tongue relax during sleep. You can treat sleep apnea at home by making lifestyle changes. You also can use a CPAP breathing machine that keeps tissues in the throat from blocking your airway. Or your doctor may suggest that you use a breathing device while you sleep. It helps keep your airway open. This could be a device that you put in your mouth. Other examples include strips or disks that you use on your nose. In some cases, surgery may be needed to remove enlarged tissues in the throat. Follow-up care is a key part of your treatment and safety. Be sure to make and go to all appointments, and call your doctor if you are having problems. It's also a good idea to know your test results and keep a list of the medicines you take. How can you care for yourself at home? · Lose weight, if needed. It may reduce the number of times you stop breathing or have slowed breathing. · Sleep on your side. It may stop mild apnea. If you tend to roll onto your back, sew a pocket in the back of your paInkventors top. Put a tennis ball into the pocket, and stitch the pocket shut. This will help keep you from sleeping on your back. · Avoid alcohol and medicines such as sleeping pills and sedatives before bed. · Do not smoke. Smoking can make sleep apnea worse. If you need help quitting, talk to your doctor about stop-smoking programs and medicines. These can increase your chances of quitting for good. · Prop up the head of your bed 4 to 6 inches by putting bricks under the legs of the bed. · Treat breathing problems, such as a stuffy nose, caused by a cold or allergies. · Try a continuous positive airway pressure (CPAP) breathing machine if your doctor recommends it.  The machine keeps your airway open when you sleep.  · If CPAP does not work for you, ask your doctor if you can try other breathing machines. A bilevel positive airway pressure machine uses one type of air pressure for breathing in and another type for breathing out. Another device raises or lowers air pressure as needed while you breathe. · Talk to your doctor if:  ¨ Your nose feels dry or bleeds when you use one of these machines. You may need to increase moisture in the air. A humidifier may help. ¨ Your nose is runny or stuffy from using a breathing machine. Decongestants or a corticosteroid nasal spray may help. ¨ You are sleepy during the day and it gets in the way of the normal things you do. Do not drive when you are drowsy. When should you call for help? Watch closely for changes in your health, and be sure to contact your doctor if:  · You still have sleep apnea even though you have made lifestyle changes. · You are thinking of trying a device such as CPAP. · You are having problems using a CPAP or similar machine. Where can you learn more? Go to http://nathalia-kam.info/. Enter S423 in the search box to learn more about \"Sleep Apnea: Care Instructions. \"  Current as of: May 23, 2016  Content Version: 11.2  © 2624-7954 [employer]. Care instructions adapted under license by [employer] (which disclaims liability or warranty for this information). If you have questions about a medical condition or this instruction, always ask your healthcare professional. [place] [address] any warranty or liability for your use of this information. Parkinson's Disease: Care Instructions  Your Care Instructions  Parkinson's disease can cause tremors, stiffness, and problems with movement. Severe or advanced cases can also cause problems with thinking. In Parkinson's disease, part of the brain cannot make enough dopamine, a chemical that helps control movement.  Taking your medicines correctly and getting regular exercise may help you maintain your quality of life. There are many things that can cause Parkinson's disease symptoms, including some medicine, some toxins, and trauma to the head. The cause in most cases is not known. Follow-up care is a key part of your treatment and safety. Be sure to make and go to all appointments, and call your doctor if you are having problems. Its also a good idea to know your test results and keep a list of the medicines you take. How can you care for yourself at home? General care  · Take your medicines exactly as prescribed. Call your doctor if you think you are having a problem with your medicine. · Make sure your home is safe:  ¨ Place furniture so that you have something to hold on to as you walk around the house. ¨ Use chairs that make it easier to sit down and stand up. ¨ Group the things you use most, such as reading glasses, keys, and the telephone, in one easy-to-reach place. ¨ Tack down rugs so that you do not trip. ¨ Put no-slip tape and handrails in the tub to prevent falls. · Use a cane, walker, or scooter if your doctor suggests it. · Keep up your normal activities as much as you can. · Find ways to manage stress, which can make symptoms worse. · Spend time with family and friends. Join a support group for people with Parkinson's disease if you want extra help. · Depression is common with this condition. Tell your doctor if you have trouble sleeping, are eating too much or are not hungry, or feel sad or tearful all the time. Depression can be treated with medicine and counseling. Diet and exercise  · Eat a balanced diet. · If you are taking levodopa, do not eat protein at the same time you take your medicine. Levodopa may not work as well if you take it at the same time you eat protein. You can eat normal amounts of protein. Talk to your doctor if you have questions.   · If you have problems swallowing, change how and what you eat:  ¨ Try thick drinks, such as milk shakes. They are easier to swallow than other fluids. ¨ Do not eat foods that crumble easily. These can cause choking. ¨ Use a  to prepare food. Soft foods need less chewing. ¨ Eat small meals often so that you do not get tired from eating heavy meals. · Drink plenty of water and eat a high-fiber diet to prevent constipation. Parkinson's--and the medicines that treat it--may slow your intestines. · Get exercise on most days. Work with your doctor to set up a program of walking, swimming, or other exercise you are able to do. When should you call for help? Call your doctor now or seek immediate medical care if:  · You have a change in your symptoms. · You develop other problems from your condition, such as:  ¨ Injury from a fall. ¨ Thinking or memory problems. ¨ A urinary tract infection (burning pain when urinating). Watch closely for changes in your health, and be sure to contact your doctor if:  · You lose weight because of problems with eating. · You want more information about your condition or your medicines. Where can you learn more? Go to http://nathalia-kam.info/. Enter K144 in the search box to learn more about \"Parkinson's Disease: Care Instructions. \"  Current as of: October 14, 2016  Content Version: 11.2  © 6015-7560 [employer]. Care instructions adapted under license by [employer] (which disclaims liability or warranty for this information). If you have questions about a medical condition or this instruction, always ask your healthcare professional. [place] [address] any warranty or liability for your use of this information.

## 2017-05-31 NOTE — PROGRESS NOTES
HISTORY OF PRESENT ILLNESS  Tiffanie Barnes is a 76 y.o. female. HPI : here for routine follow up. Concern lately with leg swelling. Feels like need to take lasix more often then before. No sob. No chest pain. Noted dry cough since last couple of months. No fever. No sputum. No chest congestion. No new medication. Try to avoid low salt. Does not wear  Compression stocking. Today vitals stable. Visit Vitals    /50 (BP 1 Location: Left arm, BP Patient Position: Sitting)    Pulse 67    Temp 98.3 °F (36.8 °C) (Oral)    Resp 16    Ht 5' (1.524 m)    Wt 138 lb (62.6 kg)    SpO2 98%    BMI 26.95 kg/m2     Also following neurology for her hand tremors. ? parkinson's disease. On medication mirapax and it has been helping. Lately also feeling daytime sleepiness and feels like snoring at night time. No new medication. Taking gabapentin since long time and that she is taking only at night time. No other pain medication she is on at this time. H/o hypothyroidism. Following endocrinologist. Recently has increased the dose. No mood changes but feels fatigue. Also felt some weight gain. Denies any urinary or bowel complains at this time. Complaint with taking medication. H/o diabetes. Well controlled. Compliance with medication and no side effects. Review medication list, vitals, problem list,allergies. Review labs.    Lab Results   Component Value Date/Time    Hemoglobin A1c 6.6 07/13/2016 09:51 AM    Hemoglobin A1c (POC) 5.9 02/01/2017 03:52 PM     Lab Results   Component Value Date/Time    Sodium 142 07/13/2016 09:51 AM    Potassium 4.1 07/13/2016 09:51 AM    Chloride 102 07/13/2016 09:51 AM    CO2 31 07/13/2016 09:51 AM    Anion gap 9 07/13/2016 09:51 AM    Glucose 124 07/13/2016 09:51 AM    BUN 27 07/13/2016 09:51 AM    Creatinine 1.15 07/13/2016 09:51 AM    BUN/Creatinine ratio 23 07/13/2016 09:51 AM    GFR est AA 55 07/13/2016 09:51 AM    GFR est non-AA 46 07/13/2016 09:51 AM    Calcium 9.9 07/13/2016 09:51 AM    Bilirubin, total 0.5 07/13/2016 09:51 AM    AST (SGOT) 14 07/13/2016 09:51 AM    Alk. phosphatase 55 07/13/2016 09:51 AM    Protein, total 7.2 07/13/2016 09:51 AM    Albumin 4.1 07/13/2016 09:51 AM    Globulin 3.1 07/13/2016 09:51 AM    A-G Ratio 1.3 07/13/2016 09:51 AM    ALT (SGPT) 19 07/13/2016 09:51 AM     Lab Results   Component Value Date/Time    Cholesterol, total 147 07/13/2016 09:51 AM    HDL Cholesterol 41 07/13/2016 09:51 AM    LDL, calculated 56.8 07/13/2016 09:51 AM    VLDL, calculated 49.2 07/13/2016 09:51 AM    Triglyceride 246 07/13/2016 09:51 AM    CHOL/HDL Ratio 3.6 07/13/2016 09:51 AM     Lab Results   Component Value Date/Time    WBC 7.6 07/13/2016 09:51 AM    HGB 11.1 07/13/2016 09:51 AM    HCT 32.9 07/13/2016 09:51 AM    PLATELET 630 75/24/9591 09:51 AM    MCV 85.2 07/13/2016 09:51 AM     Lab Results   Component Value Date/Time    TSH 0.01 10/26/2016 05:10 PM         ROS: see HPI     Physical Exam   Constitutional: She is oriented to person, place, and time. No distress. Cardiovascular: Normal rate, regular rhythm and normal heart sounds. Pulmonary/Chest:   CTA   Abdominal: Soft. Bowel sounds are normal. There is no tenderness. Musculoskeletal: She exhibits edema (mild around ankle ). Neurological: She is oriented to person, place, and time. Psychiatric: Her behavior is normal.       ASSESSMENT and PLAN    ICD-10-CM ICD-9-CM    1. Essential hypertension with goal blood pressure less than 130/80: stable. For now will decrease dose of norvasc due to leg swelling. F/u next visit. Mean time continue losartan daily. She has dry cough but will observe for now. Obtain chest x-ray  I10 401.9 valsartan (DIOVAN) 160 mg tablet      amLODIPine (NORVASC) 5 mg tablet   2. Leg swelling; lasix as needed. Keep leg elevated. Low salt diet and compression stocking. M79.89 729.81    3. Fatigue, unspecified type: could be multiple cause . will observe. R53.83 004.79    4. Hypothyroidism, unspecified type: recently changed the dose by specialist. Will observe and f/u next visit. E03.9 244.9    5. Tremor of both hands/ parkinson's disease/ following neurology : on mediation. Stable. R25.1 781.0    6. Daytime sleepiness: no change in medication. For now will send her for further evaluation. R40.0 780.54 REFERRAL TO SLEEP STUDIES   7. Snoring R06.83 786.09 REFERRAL TO SLEEP STUDIES   8. Dry cough: see above  R05 786.2 XR CHEST PA LAT   Pt understood and agrees with above plan. Review HM   Follow-up Disposition:  Return in about 1 month (around 6/30/2017).

## 2017-06-01 ENCOUNTER — TELEPHONE (OUTPATIENT)
Dept: FAMILY MEDICINE CLINIC | Age: 77
End: 2017-06-01

## 2017-06-01 NOTE — TELEPHONE ENCOUNTER
Patient called this morning. She states to disregard sleep study referral. Patient states she does not snore.

## 2017-06-12 ENCOUNTER — HOSPITAL ENCOUNTER (OUTPATIENT)
Dept: LAB | Age: 77
Discharge: HOME | End: 2017-06-12
Payer: MEDICARE

## 2017-06-12 ENCOUNTER — OFFICE VISIT (OUTPATIENT)
Dept: FAMILY MEDICINE CLINIC | Age: 77
End: 2017-06-12

## 2017-06-12 VITALS
HEIGHT: 60 IN | WEIGHT: 138 LBS | DIASTOLIC BLOOD PRESSURE: 60 MMHG | HEART RATE: 86 BPM | SYSTOLIC BLOOD PRESSURE: 100 MMHG | OXYGEN SATURATION: 97 % | BODY MASS INDEX: 27.09 KG/M2 | RESPIRATION RATE: 16 BRPM

## 2017-06-12 DIAGNOSIS — R30.9 PAIN WITH URINATION: ICD-10-CM

## 2017-06-12 DIAGNOSIS — N39.0 URINARY TRACT INFECTION WITHOUT HEMATURIA, SITE UNSPECIFIED: Primary | ICD-10-CM

## 2017-06-12 LAB
BILIRUB UR QL STRIP: NEGATIVE
GLUCOSE UR-MCNC: NORMAL MG/DL
KETONES P FAST UR STRIP-MCNC: NORMAL MG/DL
PH UR STRIP: 6 [PH] (ref 4.6–8)
PROT UR QL STRIP: NORMAL MG/DL
SP GR UR STRIP: 1.01 (ref 1–1.03)
UA UROBILINOGEN AMB POC: NORMAL (ref 0.2–1)
URINALYSIS CLARITY POC: NORMAL
URINALYSIS COLOR POC: NORMAL
URINE BLOOD POC: NORMAL
URINE LEUKOCYTES POC: NORMAL
URINE NITRITES POC: POSITIVE

## 2017-06-12 PROCEDURE — 87086 URINE CULTURE/COLONY COUNT: CPT | Performed by: FAMILY MEDICINE

## 2017-06-12 PROCEDURE — 87186 SC STD MICRODIL/AGAR DIL: CPT | Performed by: FAMILY MEDICINE

## 2017-06-12 PROCEDURE — 87077 CULTURE AEROBIC IDENTIFY: CPT | Performed by: FAMILY MEDICINE

## 2017-06-12 RX ORDER — NITROFURANTOIN 25; 75 MG/1; MG/1
100 CAPSULE ORAL 2 TIMES DAILY
Qty: 14 CAP | Refills: 0 | Status: SHIPPED | OUTPATIENT
Start: 2017-06-12 | End: 2017-06-19

## 2017-06-12 NOTE — PROGRESS NOTES
HISTORY OF PRESENT ILLNESS  Fam Lee is a 76 y.o. female. HPI: Here with c/o urinary frequency, burning and urgency started suddenly since last couple of days. Feeling suprapubic discomfort and also feeling lower back discomfort. No nausea or vomiting. No fever. Using AZO otc. Not sure any blood in urine as feels dark color. Denies any pelvic pain or vaginal discharge. Visit Vitals    /60 (BP 1 Location: Left arm, BP Patient Position: Sitting)    Pulse 86    Resp 16    Ht 5' (1.524 m)    Wt 138 lb (62.6 kg)    SpO2 97%    BMI 26.95 kg/m2     Review medication list, vitals, problem list,allergies. ROS: see HPI     Physical Exam   Constitutional: She is oriented to person, place, and time. No distress. Cardiovascular: Normal heart sounds. Pulmonary/Chest: No respiratory distress. She has no wheezes. Abdominal: Soft. There is tenderness (suprapubic ). There is no rebound and no guarding. Musculoskeletal: She exhibits no edema. Neurological: She is oriented to person, place, and time. ASSESSMENT and PLAN    ICD-10-CM ICD-9-CM    1. Urinary tract infection without hematuria, site unspecified: for now giving macrobid as bactrim has interaction with her other meds. Discussed to drink more fluid. Sending urine culture. F/u in a week or sooner if needed. N39.0 599.0 nitrofurantoin, macrocrystal-monohydrate, (MACROBID) 100 mg capsule   2. Pain with urination R30.9 788.1 AMB POC URINALYSIS DIP STICK AUTO W/O MICRO      CULTURE, URINE      nitrofurantoin, macrocrystal-monohydrate, (MACROBID) 100 mg capsule   Pt understood and agrees with above plan. Review    Follow-up Disposition:  Return in about 1 week (around 6/19/2017).

## 2017-06-12 NOTE — MR AVS SNAPSHOT
Visit Information Date & Time Provider Department Dept. Phone Encounter #  
 6/12/2017  2:00 PM Manuel Waterman, 0 [facility] [phone] [id number] Follow-up Instructions Return in about 1 week (around 6/19/2017). Your Appointments 7/12/2017  2:45 PM  
ROUTINE CARE with Manuel Waterman MD  
920 [facility] ([facility] Appt Note: routine f/u 1mo [address] [facility] [address] [facility] [address] 200 [facility]  
  
    
 7/28/2017  1:45 PM  
Follow Up with Joanne Fallon MD  
[address] [place] [address] [facility] Appt Note: 3mon f/u  
 [address] [facility] [id number] [phone]  
  
   
 [place] [id number] Upcoming Health Maintenance Date Due MICROALBUMIN Q1 7/13/2017 LIPID PANEL Q1 7/13/2017 FOOT EXAM Q1 7/14/2017 Pneumococcal 65+ Low/Medium Risk (2 of 2 - PPSV23) 7/20/2017 HEMOGLOBIN A1C Q6M 8/1/2017 INFLUENZA AGE 9 TO ADULT 8/1/2017 EYE EXAM RETINAL OR DILATED Q1 10/12/2017 MEDICARE YEARLY EXAM 2/2/2018 GLAUCOMA SCREENING Q2Y 10/12/2018 DTaP/Tdap/Td series (2 - Td) 7/20/2026 Allergies as of 6/12/2017  Review Complete On: 6/12/2017 By: Manuel Waterman MD  
  
 Severity Noted Reaction Type Reactions Codeine  06/29/2016    Swelling Codeine  07/01/2016    Other (comments) Severe swelling Feldene [Piroxicam]  06/29/2016    Swelling Severe swelling of hands, face and feet. Current Immunizations  Never Reviewed Name Date Influenza Vaccine 8/1/2016 Not reviewed this visit You Were Diagnosed With   
  
 Codes Comments Urinary tract infection without hematuria, site unspecified    -  Primary ICD-10-CM: N39.0 ICD-9-CM: 599.0 Pain with urination     ICD-10-CM: R30.9 ICD-9-CM: 958. 1 Vitals BP Pulse Resp Height(growth percentile) Weight(growth percentile) SpO2  
 100/60 (BP 1 Location: Left arm, BP Patient Position: Sitting) 86 16 5' (1.524 m) 138 lb (62.6 kg) 97% BMI OB Status Smoking Status 26.95 kg/m2 Hysterectomy Never Smoker BMI and BSA Data Body Mass Index Body Surface Area  
 26.95 kg/m 2 1.63 m 2 Preferred Pharmacy Pharmacy Name Phone [address] [place] [address], [address] [place], [place] [address]way [phone] Your Updated Medication List  
  
   
This list is accurate as of: 6/12/17  2:49 PM.  Always use your most recent med list.  
  
  
  
  
 alendronate 70 mg tablet Commonly known as:  FOSAMAX TAKE 1 TABLET EVERY 7 DAYS  
  
 amLODIPine 5 mg tablet Commonly known as:  Mosetta Hark Take 1 Tab by mouth daily. aspirin 81 mg chewable tablet Take 81 mg by mouth daily. cholecalciferol 1,000 unit Cap Commonly known as:  VITAMIN D3 Take 1,000 Units by mouth daily. CRANBERRY PLUS VITAMIN C PO Take 4,000 mg by mouth daily. diclofenac 1 % Gel Commonly known as:  VOLTAREN Apply  to affected area four (4) times daily. furosemide 40 mg tablet Commonly known as:  LASIX Take 1 Tab by mouth daily. Takes 40 mg every morning  
  
 gabapentin 300 mg capsule Commonly known as:  NEURONTIN Take 1 Cap by mouth nightly. glucose blood VI test strips strip Commonly known as:  ASCENSIA AUTODISC VI, ONE TOUCH ULTRA TEST VI  
by Does Not Apply route See Admin Instructions. Check 1-2 times a day Lancets Misc Precision X-tra. Check 1-2 times a day  
  
 levothyroxine 112 mcg tablet Commonly known as:  SYNTHROID Take 112 mcg by mouth Daily (before breakfast). magnesium 250 mg Tab Take 250 mg by mouth daily. metFORMIN 1,000 mg tablet Commonly known as:  GLUCOPHAGE  
TAKE 1 TABLET TWICE A DAY WITH MEALS NexIUM 40 mg capsule Generic drug:  esomeprazole TAKE 1 CAPSULE DAILY  
  
 nitrofurantoin (macrocrystal-monohydrate) 100 mg capsule Commonly known as:  MACROBID Take 1 Cap by mouth two (2) times a day for 7 days. omega-3 fatty acids-vitamin e 1,000 mg Cap Take 1 Cap by mouth two (2) times a day. 1,200 mg 2 times daily  
  
 pramipexole 0.5 mg tablet Commonly known as:  MIRAPEX TAKE 1 TABLET THREE TIMES A DAY  
  
 predniSONE 20 mg tablet Commonly known as:  Milus Mile Take 20 mg by mouth once as needed for Pain (gout). simvastatin 20 mg tablet Commonly known as:  ZOCOR Take 1 Tab by mouth nightly. tiZANidine 4 mg capsule Commonly known as:  Cheril Christians Take 4 mg by mouth nightly. valsartan 160 mg tablet Commonly known as:  DIOVAN Take 1 Tab by mouth daily. Prescriptions Sent to Pharmacy Refills  
 nitrofurantoin, macrocrystal-monohydrate, (MACROBID) 100 mg capsule 0 Sig: Take 1 Cap by mouth two (2) times a day for 7 days. Class: Normal  
 Pharmacy: [id number] [place] [address], [facility] Ph #: [phone] Route: Oral  
  
We Performed the Following AMB POC URINALYSIS DIP STICK AUTO W/O MICRO [43008 CPT(R)] Follow-up Instructions Return in about 1 week (around 6/19/2017). To-Do List   
 06/12/2017 Microbiology:  CULTURE, URINE Patient Instructions Urinary Tract Infection in Women: Care Instructions Your Care Instructions A urinary tract infection, or UTI, is a general term for an infection anywhere between the kidneys and the urethra (where urine comes out). Most UTIs are bladder infections. They often cause pain or burning when you urinate. UTIs are caused by bacteria and can be cured with antibiotics. Be sure to complete your treatment so that the infection goes away. Follow-up care is a key part of your treatment and safety.  Be sure to make and go to all appointments, and call your doctor if you are having problems. It's also a good idea to know your test results and keep a list of the medicines you take. How can you care for yourself at home? · Take your antibiotics as directed. Do not stop taking them just because you feel better. You need to take the full course of antibiotics. · Drink extra water and other fluids for the next day or two. This may help wash out the bacteria that are causing the infection. (If you have kidney, heart, or liver disease and have to limit fluids, talk with your doctor before you increase your fluid intake.) · Avoid drinks that are carbonated or have caffeine. They can irritate the bladder. · Urinate often. Try to empty your bladder each time. · To relieve pain, take a hot bath or lay a heating pad set on low over your lower belly or genital area. Never go to sleep with a heating pad in place. To prevent UTIs · Drink plenty of water each day. This helps you urinate often, which clears bacteria from your system. (If you have kidney, heart, or liver disease and have to limit fluids, talk with your doctor before you increase your fluid intake.) · Urinate when you need to. · Urinate right after you have sex. · Change sanitary pads often. · Avoid douches, bubble baths, feminine hygiene sprays, and other feminine hygiene products that have deodorants. · After going to the bathroom, wipe from front to back. When should you call for help? Call your doctor now or seek immediate medical care if: · Symptoms such as fever, chills, nausea, or vomiting get worse or appear for the first time. · You have new pain in your back just below your rib cage. This is called flank pain. · There is new blood or pus in your urine. · You have any problems with your antibiotic medicine. Watch closely for changes in your health, and be sure to contact your doctor if: 
· You are not getting better after taking an antibiotic for 2 days. · Your symptoms go away but then come back. Where can you learn more? Go to http://nathalia-kam.info/. Enter U098 in the search box to learn more about \"Urinary Tract Infection in Women: Care Instructions. \" Current as of: November 28, 2016 Content Version: 11.2 © 3640-9977 Marketing Munch. Care instructions adapted under license by [employer] D[employer] (which disclaims liability or warranty for this information). If you have questions about a medical condition or this instruction, always ask your healthcare professional. [address] any warranty or liability for your use of this information. Introducing [facility] & HEALTH SERVICES! Dear Antonio Cooper: Thank you for requesting a Sport Telegram account. Our records indicate that you already have an active Sport Telegram account. You can access your account anytime at [email or web address] Did you know that you can access your hospital and ER discharge instructions at any time in Sport Telegram? You can also review all of your test results from your hospital stay or ER visit. Additional Information If you have questions, please visit the Frequently Asked Questions section of the [employer] Telegram website at [email or web address] Remember, [employer]gram is NOT to be used for urgent needs. For medical emergencies, dial 911. Now available from your iPhone and Android! Please provide this summary of care documentation to your next provider. Your primary care clinician is listed as Scooby Dickerson. If you have any questions after today's visit, please call [phone].

## 2017-06-12 NOTE — PATIENT INSTRUCTIONS
Urinary Tract Infection in Women: Care Instructions  Your Care Instructions    A urinary tract infection, or UTI, is a general term for an infection anywhere between the kidneys and the urethra (where urine comes out). Most UTIs are bladder infections. They often cause pain or burning when you urinate. UTIs are caused by bacteria and can be cured with antibiotics. Be sure to complete your treatment so that the infection goes away. Follow-up care is a key part of your treatment and safety. Be sure to make and go to all appointments, and call your doctor if you are having problems. It's also a good idea to know your test results and keep a list of the medicines you take. How can you care for yourself at home? · Take your antibiotics as directed. Do not stop taking them just because you feel better. You need to take the full course of antibiotics. · Drink extra water and other fluids for the next day or two. This may help wash out the bacteria that are causing the infection. (If you have kidney, heart, or liver disease and have to limit fluids, talk with your doctor before you increase your fluid intake.)  · Avoid drinks that are carbonated or have caffeine. They can irritate the bladder. · Urinate often. Try to empty your bladder each time. · To relieve pain, take a hot bath or lay a heating pad set on low over your lower belly or genital area. Never go to sleep with a heating pad in place. To prevent UTIs  · Drink plenty of water each day. This helps you urinate often, which clears bacteria from your system. (If you have kidney, heart, or liver disease and have to limit fluids, talk with your doctor before you increase your fluid intake.)  · Urinate when you need to. · Urinate right after you have sex. · Change sanitary pads often. · Avoid douches, bubble baths, feminine hygiene sprays, and other feminine hygiene products that have deodorants.   · After going to the bathroom, wipe from front to back.  When should you call for help? Call your doctor now or seek immediate medical care if:  · Symptoms such as fever, chills, nausea, or vomiting get worse or appear for the first time. · You have new pain in your back just below your rib cage. This is called flank pain. · There is new blood or pus in your urine. · You have any problems with your antibiotic medicine. Watch closely for changes in your health, and be sure to contact your doctor if:  · You are not getting better after taking an antibiotic for 2 days. · Your symptoms go away but then come back. Where can you learn more? Go to http://nathalia-kam.info/. Enter T932 in the search box to learn more about \"Urinary Tract Infection in Women: Care Instructions. \"  Current as of: November 28, 2016  Content Version: 11.2  © 8467-7730 ITN, Inveshare. Care instructions adapted under license by [employer] Systems (which disclaims liability or warranty for this information). If you have questions about a medical condition or this instruction, always ask your healthcare professional. [address] any warranty or liability for your use of this information.

## 2017-06-12 NOTE — PROGRESS NOTES
Chief Complaint   Patient presents with    Urinary Frequency    Urinary Pain     Patient states she is here UTI symptoms. Patient states symptoms for 4 days.   Patient states she has finished with AZO this AM.

## 2017-06-13 NOTE — PROGRESS NOTES
Let pt know that continue taking antibiotic. Still sensitivity pending but culture showed UTI. Thanks.

## 2017-06-14 LAB
BACTERIA SPEC CULT: ABNORMAL
SERVICE CMNT-IMP: ABNORMAL

## 2017-06-20 ENCOUNTER — TELEPHONE (OUTPATIENT)
Dept: FAMILY MEDICINE CLINIC | Age: 77
End: 2017-06-20

## 2017-06-20 NOTE — TELEPHONE ENCOUNTER
Patient called back returning nurse Alexa's phone call. Please call patient back at your earliest convenience.

## 2017-06-22 RX ORDER — CEPHALEXIN 500 MG/1
500 CAPSULE ORAL 2 TIMES DAILY
Qty: 14 CAP | Refills: 0 | Status: SHIPPED | OUTPATIENT
Start: 2017-06-22 | End: 2017-06-29

## 2017-06-22 NOTE — PROGRESS NOTES
Reviewed UCx, trial switch to keflex 500 mg bid x 7 days, erx done. If persistent symptoms, ff-up with Dr. Kaye Araujo next week.

## 2017-06-22 NOTE — PROGRESS NOTES
Contacted patient and verified identity using name and date of birth (2- identifiers)  Spoke with patient and she advised she has been waiting for a call back for 2 days as her symptoms have not resolved and she is still having pain. I advised patient that the ABX should have cleared it up and she reports to taking all of the Rx as directed. The symptoms went away initially for a day or 2 then since have returned. I advised patient I would let Dr. Page Sanchez know she is still symptomatic. Will also route to Dr. Latisha Stephens in Dr. Rosalva Domínguez absence.

## 2017-06-22 NOTE — PROGRESS NOTES
Contacted patient and verified identity using name and date of birth (2- identifiers)  Spoke with patient and advised of new ABX sent to pharmacy.  Also advised to follow-up with Dr. Maryjane Olivier if symptoms perisist.

## 2017-08-09 ENCOUNTER — OFFICE VISIT (OUTPATIENT)
Dept: FAMILY MEDICINE CLINIC | Age: 77
End: 2017-08-09

## 2017-08-09 VITALS
OXYGEN SATURATION: 100 % | TEMPERATURE: 98.4 F | DIASTOLIC BLOOD PRESSURE: 62 MMHG | HEART RATE: 67 BPM | BODY MASS INDEX: 27.48 KG/M2 | HEIGHT: 60 IN | SYSTOLIC BLOOD PRESSURE: 114 MMHG | WEIGHT: 140 LBS | RESPIRATION RATE: 16 BRPM

## 2017-08-09 DIAGNOSIS — N39.0 URINARY TRACT INFECTION WITHOUT HEMATURIA, SITE UNSPECIFIED: Primary | ICD-10-CM

## 2017-08-09 DIAGNOSIS — M47.899 FACET SYNDROME: ICD-10-CM

## 2017-08-09 DIAGNOSIS — E11.9 WELL CONTROLLED DIABETES MELLITUS (HCC): ICD-10-CM

## 2017-08-09 DIAGNOSIS — E11.9 DIABETES MELLITUS TYPE 2, CONTROLLED (HCC): ICD-10-CM

## 2017-08-09 DIAGNOSIS — Z87.39 H/O: GOUT: ICD-10-CM

## 2017-08-09 DIAGNOSIS — D64.9 ANEMIA, UNSPECIFIED TYPE: ICD-10-CM

## 2017-08-09 DIAGNOSIS — M79.18 MYOFASCIAL PAIN: ICD-10-CM

## 2017-08-09 DIAGNOSIS — M79.89 LEG SWELLING: ICD-10-CM

## 2017-08-09 RX ORDER — PREDNISONE 20 MG/1
20 TABLET ORAL
Status: CANCELLED | OUTPATIENT
Start: 2017-08-09

## 2017-08-09 RX ORDER — METOLAZONE 10 MG/1
5 TABLET ORAL DAILY
COMMUNITY
End: 2019-02-08

## 2017-08-09 RX ORDER — LANOLIN ALCOHOL/MO/W.PET/CERES
CREAM (GRAM) TOPICAL
COMMUNITY
End: 2017-08-09 | Stop reason: SDUPTHER

## 2017-08-09 RX ORDER — LANOLIN ALCOHOL/MO/W.PET/CERES
325 CREAM (GRAM) TOPICAL 2 TIMES DAILY
Qty: 60 TAB | Refills: 2 | Status: SHIPPED | OUTPATIENT
Start: 2017-08-09 | End: 2019-02-08

## 2017-08-09 RX ORDER — METFORMIN HYDROCHLORIDE 1000 MG/1
TABLET ORAL
Qty: 180 TAB | Refills: 1 | Status: SHIPPED | OUTPATIENT
Start: 2017-08-09 | End: 2018-02-05 | Stop reason: SDUPTHER

## 2017-08-09 NOTE — MR AVS SNAPSHOT
Visit Information Date & Time Provider Department Dept. Phone Encounter #  
 8/9/2017  1:45 PM Robbie Chakraborty, [address] [facility] [address] [id number] Follow-up Instructions Return in about 2 months (around 10/9/2017). Your Appointments 9/13/2017  1:45 PM  
Follow Up with Lakia Osborne MD  
[address] at [address] [facility]) Appt Note: 3mon f/u; Dr. Malissa Hong @ [facility]; LVM reminding of apt on 07/28/2017 @ [facility]  Address Given; P/t rescheduled to this date 7/27 [facility] [address] [facility] [address] [address] [address] [facility] Upcoming Health Maintenance Date Due MICROALBUMIN Q1 7/13/2017 LIPID PANEL Q1 7/13/2017 HEMOGLOBIN A1C Q6M 8/1/2017 INFLUENZA AGE 9 TO ADULT 8/1/2017 EYE EXAM RETINAL OR DILATED Q1 10/12/2017 MEDICARE YEARLY EXAM 2/2/2018 FOOT EXAM Q1 8/9/2018 GLAUCOMA SCREENING Q2Y 10/12/2018 DTaP/Tdap/Td series (2 - Td) 7/20/2026 Allergies as of 8/9/2017  Review Complete On: 8/9/2017 By: Robbie Chakraborty MD  
  
 Severity Noted Reaction Type Reactions Codeine  06/29/2016    Swelling Codeine  07/01/2016    Other (comments) Severe swelling Feldene [Piroxicam]  06/29/2016    Swelling Severe swelling of hands, face and feet. Current Immunizations  Never Reviewed Name Date Influenza Vaccine 8/1/2016 Not reviewed this visit You Were Diagnosed With   
  
 Codes Comments Urinary tract infection without hematuria, site unspecified    -  Primary ICD-10-CM: N39.0 ICD-9-CM: 599.0 Well controlled diabetes mellitus ([facility] [address]     ICD-10-CM: E11.9 ICD-9-CM: 250.00 Anemia, unspecified type     ICD-10-CM: D64.9 ICD-9-CM: 285.9 Facet syndrome     ICD-10-CM: M12.88 ICD-9-CM: 724.8 Myofascial pain     ICD-10-CM: M79.1 ICD-9-CM: 729.1  H/O: gout     ICD-10-CM: Z87.39 
 ICD-9-CM: V12.29 Leg swelling     ICD-10-CM: M79.89 ICD-9-CM: 729.81 Vitals BP Pulse Temp Resp Height(growth percentile) Weight(growth percentile) 114/62 (BP 1 Location: Left arm, BP Patient Position: Sitting) 67 98.4 °F (36.9 °C) (Oral) 16 5' (1.524 m) 140 lb (63.5 kg) SpO2 BMI OB Status Smoking Status 100% 27.34 kg/m2 Hysterectomy Never Smoker Vitals History BMI and BSA Data Body Mass Index Body Surface Area  
 27.34 kg/m 2 1.64 m 2 Preferred Pharmacy Pharmacy Name Phone [address], [address] [place] [address] [phone] Your Updated Medication List  
  
   
This list is accurate as of: 8/9/17  2:58 PM.  Always use your most recent med list.  
  
  
  
  
 alendronate 70 mg tablet Commonly known as:  FOSAMAX TAKE 1 TABLET EVERY 7 DAYS  
  
 amLODIPine 5 mg tablet Commonly known as:  Pace Barges Take 1 Tab by mouth daily. aspirin 81 mg chewable tablet Take 81 mg by mouth daily. cholecalciferol 1,000 unit Cap Commonly known as:  VITAMIN D3 Take 1,000 Units by mouth daily. CRANBERRY PLUS VITAMIN C PO Take 4,000 mg by mouth daily. diclofenac 1 % Gel Commonly known as:  VOLTAREN Apply  to affected area four (4) times daily. ferrous sulfate 325 mg (65 mg iron) tablet Commonly known as:  Iron (Ferrous Sulfate) Take 1 Tab by mouth two (2) times a day. furosemide 40 mg tablet Commonly known as:  LASIX Take 1 Tab by mouth daily. Takes 40 mg every morning  
  
 gabapentin 300 mg capsule Commonly known as:  NEURONTIN Take 1 Cap by mouth nightly. glucose blood VI test strips strip Commonly known as:  ASCENSIA AUTODISC VI, ONE TOUCH ULTRA TEST VI  
by Does Not Apply route See Admin Instructions. Check 1-2 times a day Lancets Misc Precision X-tra. Check 1-2 times a day  
  
 levothyroxine 112 mcg tablet Commonly known as:  SYNTHROID  
 Take 112 mcg by mouth Daily (before breakfast). magnesium 250 mg Tab Take 250 mg by mouth daily. metFORMIN 1,000 mg tablet Commonly known as:  GLUCOPHAGE  
TAKE 1 TABLET TWICE A DAY WITH MEALS  
  
 metOLazone 10 mg tablet Commonly known as:  Cornelious Lobstein Take 5 mg by mouth daily. NexIUM 40 mg capsule Generic drug:  esomeprazole TAKE 1 CAPSULE DAILY  
  
 omega-3 fatty acids-vitamin e 1,000 mg Cap Take 1 Cap by mouth two (2) times a day. 1,200 mg 2 times daily  
  
 pramipexole 0.5 mg tablet Commonly known as:  MIRAPEX TAKE 1 TABLET THREE TIMES A DAY  
  
 predniSONE 20 mg tablet Commonly known as:  Lannis Jaqueline Take 20 mg by mouth once as needed for Pain (gout). simvastatin 20 mg tablet Commonly known as:  ZOCOR Take 1 Tab by mouth nightly. tiZANidine 4 mg capsule Commonly known as:  Wannetta Peguero Take 4 mg by mouth nightly. valsartan 160 mg tablet Commonly known as:  DIOVAN Take 1 Tab by mouth daily. Prescriptions Sent to Pharmacy Refills  
 ferrous sulfate (IRON, FERROUS SULFATE,) 325 mg (65 mg iron) tablet 2 Sig: Take 1 Tab by mouth two (2) times a day. Class: Normal  
 Pharmacy: [address] [place] [address], [facility] Ph #: [phone] Route: Oral  
  
Follow-up Instructions Return in about 2 months (around 10/9/2017). To-Do List   
 08/09/2017 Lab:  HEMOGLOBIN A1C WITH EAG   
  
 08/09/2017 Lab:  IRON PROFILE   
  
 08/09/2017 Lab:  LIPID PANEL   
  
 08/09/2017 Lab:  MICROALBUMIN, UR, RAND W/ MICROALBUMIN/CREA RATIO   
  
 08/09/2017 Lab:  URIC ACID Introducing [facility] & HEALTH SERVICES! Dear Mirtha Hoffman: Thank you for requesting a Family Pet account. Our records indicate that you already have an active Family Pet account. You can access your account anytime at [email or web address] Did you know that you can access your hospital and ER discharge instructions at any time in [employer]? You can also review all of your test results from your hospital stay or ER visit. Additional Information If you have questions, please visit the Frequently Asked Questions section of the [employer] website at [email or web address] Remember, [employer] is NOT to be used for urgent needs. For medical emergencies, dial 911. Now available from your iPhone and Android! Please provide this summary of care documentation to your next provider. Your primary care clinician is listed as Laurent Riggins. If you have any questions after today's visit, please call [phone].

## 2017-08-09 NOTE — PROGRESS NOTES
1. Have you been to the ER, urgent care clinic since your last visit? Hospitalized since your last visit? No    2. Have you seen or consulted any other health care providers outside of the [address] [place] [address] since your last visit? Include any pap smears or colon screening. Dr. Neo Sorto, LOV: 7/13/17 - she has a new endocrinology provider and has an upcoming appointment with Dr. Sanon Exon the end of September. Last flu vaccine 8/2016     Patient is not sure if she had pneumonia vaccine; thinks she may have had one but not sure which type.

## 2017-08-09 NOTE — PROGRESS NOTES
HISTORY OF PRESENT ILLNESS  Frantz Honeycutt is a 76 y.o. female. HPI: Here for follow up on UTI. Done with antibiotic and feels better with urinary symptoms. No abdominal pain. No bowel complains. No unusual fatigue. No nausea or vomiting. Has h/o diabetes. Last HBA1C was at goal. Denies any hypoglycemia or elevated fasting blood sugar more than 120. Trying to follow diabetic diet instructions. [address] [facility] labs from Dr. Mellissa Trevino office. Mentioned that physician was too rude and wanted to change the specialist. Now has coming up appt with Dr. Dee Dee Ngo. Review lab results. Thyroid function, cmp wnl. Noted anemia on cbc. Last hemoglobin per labs in the chart was around 11 and now around 10. She denies any blood in stool. No dietary changes recently. She has started taking otc iron tabs. Inell Mantel it has been changed to mild hard stool but no constipation. Going daily. Discuss to take metamucil if needed. High iron rich food. Has h/o gout. No recent flair. On diuretic for leg swelling. Taking them as needed. For now will observe. Checking labs. Visit Vitals    /62 (BP 1 Location: Left arm, BP Patient Position: Sitting)    Pulse 67    Temp 98.4 °F (36.9 °C) (Oral)    Resp 16    Ht 5' (1.524 m)    Wt 140 lb (63.5 kg)    SpO2 100%    BMI 27.34 kg/m2     Review medication list, vitals, problem list,allergies.    Lab Results   Component Value Date/Time    Hemoglobin A1c 6.6 07/13/2016 09:51 AM    Hemoglobin A1c (POC) 5.9 02/01/2017 03:52 PM     Lab Results   Component Value Date/Time    Sodium 142 07/13/2016 09:51 AM    Potassium 4.1 07/13/2016 09:51 AM    Chloride 102 07/13/2016 09:51 AM    CO2 31 07/13/2016 09:51 AM    Anion gap 9 07/13/2016 09:51 AM    Glucose 124 07/13/2016 09:51 AM    BUN 27 07/13/2016 09:51 AM    Creatinine 1.15 07/13/2016 09:51 AM    BUN/Creatinine ratio 23 07/13/2016 09:51 AM    GFR est AA 55 07/13/2016 09:51 AM    GFR est non-AA 46 07/13/2016 09:51 AM    Calcium 9.9 07/13/2016 09:51 AM    Bilirubin, total 0.5 07/13/2016 09:51 AM    AST (SGOT) 14 07/13/2016 09:51 AM    Alk. phosphatase 55 07/13/2016 09:51 AM    Protein, total 7.2 07/13/2016 09:51 AM    Albumin 4.1 07/13/2016 09:51 AM    Globulin 3.1 07/13/2016 09:51 AM    A-G Ratio 1.3 07/13/2016 09:51 AM    ALT (SGPT) 19 07/13/2016 09:51 AM     Lab Results   Component Value Date/Time    WBC 7.6 07/13/2016 09:51 AM    HGB 11.1 07/13/2016 09:51 AM    HCT 32.9 07/13/2016 09:51 AM    PLATELET 890 82/36/6558 09:51 AM    MCV 85.2 07/13/2016 09:51 AM     Lab Results   Component Value Date/Time    TSH 0.01 10/26/2016 05:10 PM     Lab Results   Component Value Date/Time    Microalbumin/Creat ratio (mg/g creat) 16 07/13/2016 09:51 AM    Microalbumin,urine random 1.54 07/13/2016 09:51 AM     Lab Results   Component Value Date/Time    Cholesterol, total 147 07/13/2016 09:51 AM    HDL Cholesterol 41 07/13/2016 09:51 AM    LDL, calculated 56.8 07/13/2016 09:51 AM    VLDL, calculated 49.2 07/13/2016 09:51 AM    Triglyceride 246 07/13/2016 09:51 AM    CHOL/HDL Ratio 3.6 07/13/2016 09:51 AM   Denies any headache, dizziness, no chest pain or trouble breathing, no arm or leg weakness. No nausea or vomiting, no weight or appetite changes, no depression or anxiety. No urine or bowel complains, no palpitation, no diaphoresis. ROS: see HPI     Physical Exam   Constitutional: She is oriented to person, place, and time. No distress. Cardiovascular: Normal heart sounds. Pulmonary/Chest: No respiratory distress. She has no wheezes. Abdominal: Soft. There is no tenderness. Musculoskeletal: She exhibits no edema. Neurological: She is oriented to person, place, and time. Psychiatric: Her behavior is normal.       ASSESSMENT and PLAN    ICD-10-CM ICD-9-CM    1. Urinary tract infection without hematuria, site unspecified: improved symptomatically with antibiotic. Observe for now. N39.0 599.0    2.  Well controlled diabetes mellitus ([facility] [address]): HBA1C at goal. No hypoglycemia. For now checking labs and continue current plan. E11.9 250.00 HEMOGLOBIN A1C WITH EAG      LIPID PANEL      MICROALBUMIN, UR, RAND W/ MICROALBUMIN/CREA RATIO   3. Anemia, unspecified type: starting on iron tab. s checking iron profile. Dietary supplement. Observe for now. Will recheck labs next visit. Advised to use metamucil if gets constipation with iron tabs. D64.9 285.9 IRON PROFILE      ferrous sulfate (IRON, FERROUS SULFATE,) 325 mg (65 mg iron) tablet   4. Facet syndrome M12.88 724.8    5. Myofascial pain M79.1 729.1    6. H/O: gout: no recurrent episode. On diuretic. discuss with her that it can trigger episode. Will observe for now. Z87.39 V12.29 URIC ACID   7. Leg swelling: stable with as needed diuretic. Takes lasix as needed couple of times a week and zaroxolyn only lasix does not help./  M79.89 729.81    Pt understood and agrees with above plan. Review    Follow-up Disposition:  Return in about 2 months (around 10/9/2017).

## 2017-08-13 DIAGNOSIS — I10 ESSENTIAL HYPERTENSION WITH GOAL BLOOD PRESSURE LESS THAN 130/80: ICD-10-CM

## 2017-08-14 DIAGNOSIS — M79.18 MYOFASCIAL PAIN: ICD-10-CM

## 2017-08-14 DIAGNOSIS — Z87.39 H/O OSTEOPENIA: ICD-10-CM

## 2017-08-14 DIAGNOSIS — M47.899 FACET SYNDROME: ICD-10-CM

## 2017-08-15 RX ORDER — ALENDRONATE SODIUM 70 MG/1
TABLET ORAL
Qty: 12 TAB | Refills: 0 | Status: SHIPPED | OUTPATIENT
Start: 2017-08-15 | End: 2017-11-07 | Stop reason: SDUPTHER

## 2017-08-15 RX ORDER — AMLODIPINE BESYLATE 5 MG/1
TABLET ORAL
Qty: 90 TAB | Refills: 1 | Status: SHIPPED | OUTPATIENT
Start: 2017-08-15 | End: 2018-02-11 | Stop reason: SDUPTHER

## 2017-08-17 ENCOUNTER — TELEPHONE (OUTPATIENT)
Dept: NEUROLOGY | Age: 77
End: 2017-08-17

## 2017-08-17 NOTE — TELEPHONE ENCOUNTER
Patient calling to check status of clearance to participate in pool exercises at the [facility] as recommended by Dr. Adam April at last ofc visit. Below you will find a copy of patients email requesting this to Dr. Kia Hall. It also includes a fax number to send request. Please advise patient of status. \"I visited the [facility] this am upon your recommendation. Amol Confer require documentation from you that I am medically able to participate. [facility] fax release to [phone].  Thanks in Advance.  \"

## 2017-08-17 NOTE — LETTER
8/17/2017 3:58 PM 
 
Ms. Cleo Crooks [address] [place] [address] To Whom It May Concern,  
 
 
From a neurological standpoint, this patient is stable for exercise. Sincerely, Mendoza Waters MD

## 2017-08-30 RX ORDER — SIMVASTATIN 20 MG/1
TABLET, FILM COATED ORAL
Qty: 90 TAB | Refills: 0 | Status: SHIPPED | OUTPATIENT
Start: 2017-08-30 | End: 2017-11-28 | Stop reason: SDUPTHER

## 2017-09-25 DIAGNOSIS — E11.9 DIABETES MELLITUS TYPE 2, CONTROLLED (HCC): ICD-10-CM

## 2017-09-25 RX ORDER — GABAPENTIN 300 MG/1
CAPSULE ORAL
Qty: 30 CAP | Refills: 0 | Status: SHIPPED | OUTPATIENT
Start: 2017-09-25 | End: 2018-01-22 | Stop reason: SDUPTHER

## 2017-09-25 RX ORDER — BLOOD SUGAR DIAGNOSTIC
STRIP MISCELLANEOUS
Qty: 100 STRIP | Refills: 6 | Status: SHIPPED | OUTPATIENT
Start: 2017-09-25

## 2017-11-04 ENCOUNTER — HOSPITAL ENCOUNTER (OUTPATIENT)
Dept: LAB | Age: 77
Discharge: HOME | End: 2017-11-04
Payer: MEDICARE

## 2017-11-04 DIAGNOSIS — E11.9 WELL CONTROLLED DIABETES MELLITUS (HCC): ICD-10-CM

## 2017-11-04 DIAGNOSIS — D64.9 ANEMIA, UNSPECIFIED TYPE: ICD-10-CM

## 2017-11-04 DIAGNOSIS — Z87.39 H/O: GOUT: ICD-10-CM

## 2017-11-04 LAB
CHOLEST SERPL-MCNC: 169 MG/DL
CREAT UR-MCNC: 119 MG/DL (ref 30–125)
EST. AVERAGE GLUCOSE BLD GHB EST-MCNC: 134 MG/DL
HBA1C MFR BLD: 6.3 % (ref 4.2–5.6)
HDLC SERPL-MCNC: 49 MG/DL (ref 40–60)
HDLC SERPL: 3.4 {RATIO} (ref 0–5)
IRON SATN MFR SERPL: 15 %
IRON SERPL-MCNC: 49 UG/DL (ref 50–175)
LDLC SERPL CALC-MCNC: 87.2 MG/DL (ref 0–100)
LIPID PROFILE,FLP: ABNORMAL
MICROALBUMIN UR-MCNC: 6.93 MG/DL (ref 0–3)
MICROALBUMIN/CREAT UR-RTO: 58 MG/G (ref 0–30)
TIBC SERPL-MCNC: 317 UG/DL (ref 250–450)
TRIGL SERPL-MCNC: 164 MG/DL (ref ?–150)
URATE SERPL-MCNC: 5.4 MG/DL (ref 2.6–7.2)
VLDLC SERPL CALC-MCNC: 32.8 MG/DL

## 2017-11-04 PROCEDURE — 80061 LIPID PANEL: CPT | Performed by: FAMILY MEDICINE

## 2017-11-04 PROCEDURE — 82043 UR ALBUMIN QUANTITATIVE: CPT | Performed by: FAMILY MEDICINE

## 2017-11-04 PROCEDURE — 84550 ASSAY OF BLOOD/URIC ACID: CPT | Performed by: FAMILY MEDICINE

## 2017-11-04 PROCEDURE — 83036 HEMOGLOBIN GLYCOSYLATED A1C: CPT | Performed by: FAMILY MEDICINE

## 2017-11-04 PROCEDURE — 36415 COLL VENOUS BLD VENIPUNCTURE: CPT | Performed by: FAMILY MEDICINE

## 2017-11-04 PROCEDURE — 83540 ASSAY OF IRON: CPT | Performed by: FAMILY MEDICINE

## 2017-11-06 NOTE — PROGRESS NOTES
Let pt know that diabetes test is well controlled. Lipid panel also improved. For now continue current plan. Further discussion on follow up visit . thanks.

## 2017-11-07 ENCOUNTER — OFFICE VISIT (OUTPATIENT)
Dept: FAMILY MEDICINE CLINIC | Age: 77
End: 2017-11-07

## 2017-11-07 VITALS
SYSTOLIC BLOOD PRESSURE: 132 MMHG | WEIGHT: 149.2 LBS | HEART RATE: 72 BPM | RESPIRATION RATE: 16 BRPM | DIASTOLIC BLOOD PRESSURE: 66 MMHG | OXYGEN SATURATION: 99 % | HEIGHT: 60 IN | BODY MASS INDEX: 29.29 KG/M2 | TEMPERATURE: 98.5 F

## 2017-11-07 DIAGNOSIS — E03.9 HYPOTHYROIDISM, UNSPECIFIED TYPE: ICD-10-CM

## 2017-11-07 DIAGNOSIS — K59.09 OTHER CONSTIPATION: ICD-10-CM

## 2017-11-07 DIAGNOSIS — D50.8 OTHER IRON DEFICIENCY ANEMIA: ICD-10-CM

## 2017-11-07 DIAGNOSIS — M47.899 FACET SYNDROME: ICD-10-CM

## 2017-11-07 DIAGNOSIS — E11.8 TYPE 2 DIABETES MELLITUS WITH COMPLICATION, WITHOUT LONG-TERM CURRENT USE OF INSULIN (HCC): ICD-10-CM

## 2017-11-07 DIAGNOSIS — I10 ESSENTIAL HYPERTENSION WITH GOAL BLOOD PRESSURE LESS THAN 130/80: ICD-10-CM

## 2017-11-07 DIAGNOSIS — Z87.39 H/O OSTEOPENIA: ICD-10-CM

## 2017-11-07 DIAGNOSIS — M79.89 LEG SWELLING: ICD-10-CM

## 2017-11-07 DIAGNOSIS — K21.9 GASTROESOPHAGEAL REFLUX DISEASE WITHOUT ESOPHAGITIS: Primary | ICD-10-CM

## 2017-11-07 DIAGNOSIS — M76.60 ACHILLES TENDON PAIN: ICD-10-CM

## 2017-11-07 DIAGNOSIS — M79.18 MYOFASCIAL PAIN: ICD-10-CM

## 2017-11-07 PROBLEM — E08.40 DIABETES MELLITUS DUE TO UNDERLYING CONDITION WITH DIABETIC NEUROPATHY (HCC): Status: ACTIVE | Noted: 2017-11-07

## 2017-11-07 PROBLEM — E08.40 DIABETES MELLITUS DUE TO UNDERLYING CONDITION WITH DIABETIC NEUROPATHY (HCC): Status: RESOLVED | Noted: 2017-11-07 | Resolved: 2017-11-07

## 2017-11-07 RX ORDER — ALENDRONATE SODIUM 70 MG/1
TABLET ORAL
Qty: 12 TAB | Refills: 0 | Status: SHIPPED | OUTPATIENT
Start: 2017-11-07 | End: 2018-01-30 | Stop reason: SDUPTHER

## 2017-11-07 RX ORDER — RANITIDINE 150 MG/1
150 TABLET, FILM COATED ORAL 2 TIMES DAILY
Qty: 60 TAB | Refills: 2 | Status: SHIPPED | OUTPATIENT
Start: 2017-11-07 | End: 2017-11-10 | Stop reason: SDUPTHER

## 2017-11-07 NOTE — PATIENT INSTRUCTIONS
Learning About Diabetes Food Guidelines  Your Care Instructions    Meal planning is important to manage diabetes. It helps keep your blood sugar at a target level (which you set with your doctor). You don't have to eat special foods. You can eat what your family eats, including sweets once in a while. But you do have to pay attention to how often you eat and how much you eat of certain foods. You may want to work with a dietitian or a certified diabetes educator (CDE) to help you plan meals and snacks. A dietitian or CDE can also help you lose weight if that is one of your goals. What should you know about eating carbs? Managing the amount of carbohydrate (carbs) you eat is an important part of healthy meals when you have diabetes. Carbohydrate is found in many foods. · Learn which foods have carbs. And learn the amounts of carbs in different foods. ¨ Bread, cereal, pasta, and rice have about 15 grams of carbs in a serving. A serving is 1 slice of bread (1 ounce), ½ cup of cooked cereal, or 1/3 cup of cooked pasta or rice. ¨ Fruits have 15 grams of carbs in a serving. A serving is 1 small fresh fruit, such as an apple or orange; ½ of a banana; ½ cup of cooked or canned fruit; ½ cup of fruit juice; 1 cup of melon or raspberries; or 2 tablespoons of dried fruit. ¨ Milk and no-sugar-added yogurt have 15 grams of carbs in a serving. A serving is 1 cup of milk or 2/3 cup of no-sugar-added yogurt. ¨ Starchy vegetables have 15 grams of carbs in a serving. A serving is ½ cup of mashed potatoes or sweet potato; 1 cup winter squash; ½ of a small baked potato; ½ cup of cooked beans; or ½ cup cooked corn or green peas. · Learn how much carbs to eat each day and at each meal. A dietitian or CDE can teach you how to keep track of the amount of carbs you eat. This is called carbohydrate counting. · If you are not sure how to count carbohydrate grams, use the Plate Method to plan meals.  It is a good, quick way to make sure that you have a balanced meal. It also helps you spread carbs throughout the day. ¨ Divide your plate by types of foods. Put non-starchy vegetables on half the plate, meat or other protein food on one-quarter of the plate, and a grain or starchy vegetable in the final quarter of the plate. To this you can add a small piece of fruit and 1 cup of milk or yogurt, depending on how many carbs you are supposed to eat at a meal.  · Try to eat about the same amount of carbs at each meal. Do not \"save up\" your daily allowance of carbs to eat at one meal.  · Proteins have very little or no carbs per serving. Examples of proteins are beef, chicken, turkey, fish, eggs, tofu, cheese, cottage cheese, and peanut butter. A serving size of meat is 3 ounces, which is about the size of a deck of cards. Examples of meat substitute serving sizes (equal to 1 ounce of meat) are 1/4 cup of cottage cheese, 1 egg, 1 tablespoon of peanut butter, and ½ cup of tofu. How can you eat out and still eat healthy? · Learn to estimate the serving sizes of foods that have carbohydrate. If you measure food at home, it will be easier to estimate the amount in a serving of restaurant food. · If the meal you order has too much carbohydrate (such as potatoes, corn, or baked beans), ask to have a low-carbohydrate food instead. Ask for a salad or green vegetables. · If you use insulin, check your blood sugar before and after eating out to help you plan how much to eat in the future. · If you eat more carbohydrate at a meal than you had planned, take a walk or do other exercise. This will help lower your blood sugar. What else should you know? · Limit saturated fat, such as the fat from meat and dairy products. This is a healthy choice because people who have diabetes are at higher risk of heart disease. So choose lean cuts of meat and nonfat or low-fat dairy products.  Use olive or canola oil instead of butter or shortening when cooking. · Don't skip meals. Your blood sugar may drop too low if you skip meals and take insulin or certain medicines for diabetes. · Check with your doctor before you drink alcohol. Alcohol can cause your blood sugar to drop too low. Alcohol can also cause a bad reaction if you take certain diabetes medicines. Follow-up care is a key part of your treatment and safety. Be sure to make and go to all appointments, and call your doctor if you are having problems. It's also a good idea to know your test results and keep a list of the medicines you take. Where can you learn more? Go to http://nathalia-kam.info/. Enter C271 in the search box to learn more about \"Learning About Diabetes Food Guidelines. \"  Current as of: March 13, 2017  Content Version: 11.4  © 6529-2833 [employer]. Care instructions adapted under license by [employer] (which disclaims liability or warranty for this information). If you have questions about a medical condition or this instruction, always ask your healthcare professional. [place] [address] any warranty or liability for your use of this information. Low Sodium Diet (2,000 Milligram): Care Instructions  Your Care Instructions    Too much sodium causes your body to hold on to extra water. This can raise your blood pressure and force your heart and kidneys to work harder. In very serious cases, this could cause you to be put in the hospital. It might even be life-threatening. By limiting sodium, you will feel better and lower your risk of serious problems. The most common source of sodium is salt. People get most of the salt in their diet from canned, prepared, and packaged foods. Fast food and restaurant meals also are very high in sodium. Your doctor will probably limit your sodium to less than 2,000 milligrams (mg) a day.  This limit counts all the sodium in prepared and packaged foods and any salt you add to your food.  Follow-up care is a key part of your treatment and safety. Be sure to make and go to all appointments, and call your doctor if you are having problems. It's also a good idea to know your test results and keep a list of the medicines you take. How can you care for yourself at home? Read food labels  · Read labels on cans and food packages. The labels tell you how much sodium is in each serving. Make sure that you look at the serving size. If you eat more than the serving size, you have eaten more sodium. · Food labels also tell you the Percent Daily Value for sodium. Choose products with low Percent Daily Values for sodium. · Be aware that sodium can come in forms other than salt, including monosodium glutamate (MSG), sodium citrate, and sodium bicarbonate (baking soda). MSG is often added to Asian food. When you eat out, you can sometimes ask for food without MSG or added salt. Buy low-sodium foods  · Buy foods that are labeled \"unsalted\" (no salt added), \"sodium-free\" (less than 5 mg of sodium per serving), or \"low-sodium\" (less than 140 mg of sodium per serving). Foods labeled \"reduced-sodium\" and \"light sodium\" may still have too much sodium. Be sure to read the label to see how much sodium you are getting. · Buy fresh vegetables, or frozen vegetables without added sauces. Buy low-sodium versions of canned vegetables, soups, and other canned goods. Prepare low-sodium meals  · Cut back on the amount of salt you use in cooking. This will help you adjust to the taste. Do not add salt after cooking. One teaspoon of salt has about 2,300 mg of sodium. · Take the salt shaker off the table. · Flavor your food with garlic, lemon juice, onion, vinegar, herbs, and spices. Do not use soy sauce, lite soy sauce, steak sauce, onion salt, garlic salt, celery salt, mustard, or ketchup on your food. · Use low-sodium salad dressings, sauces, and ketchup.  Or make your own salad dressings and sauces without adding salt.  · Use less salt (or none) when recipes call for it. You can often use half the salt a recipe calls for without losing flavor. Other foods such as rice, pasta, and grains do not need added salt. · Rinse canned vegetables, and cook them in fresh water. This removes some-but not all-of the salt. · Avoid water that is naturally high in sodium or that has been treated with water softeners, which add sodium. Call your local water company to find out the sodium content of your water supply. If you buy bottled water, read the label and choose a sodium-free brand. Avoid high-sodium foods  · Avoid eating:  ¨ Smoked, cured, salted, and canned meat, fish, and poultry. ¨ Ham, peterson, hot dogs, and luncheon meats. ¨ Regular, hard, and processed cheese and regular peanut butter. ¨ Crackers with salted tops, and other salted snack foods such as pretzels, chips, and salted popcorn. ¨ Frozen prepared meals, unless labeled low-sodium. ¨ Canned and dried soups, broths, and bouillon, unless labeled sodium-free or low-sodium. ¨ Canned vegetables, unless labeled sodium-free or low-sodium. ¨ Western K[employer] fries, pizza, tacos, and other fast foods. ¨ Pickles, olives, ketchup, and other condiments, especially soy sauce, unless labeled sodium-free or low-sodium. Where can you learn more? Go to http://nathalia-kam.info/. Enter F599 in the search box to learn more about \"Low Sodium Diet (2,000 Milligram): Care Instructions. \"  Current as of: May 12, 2017  Content Version: 11.4  © 9660-1256 [employer]. Care instructions adapted under license by [employer] (which disclaims liability or warranty for this information). If you have questions about a medical condition or this instruction, always ask your healthcare professional. [address] any warranty or liability for your use of this information.        Achilles Tendon: Exercises  Your Care Instructions  Here are some examples of exercises for your achilles tendon. Start each exercise slowly. Ease off the exercise if you start to have pain. Your doctor or physical therapist will tell you when you can start these exercises and which ones will work best for you. How to do the exercises  Toe stretch    1. Sit in a chair, and extend your affected leg so that your heel is on the floor. 2. With your hand, reach down and pull your big toe up and back. Pull toward your ankle and away from the floor. 3. Hold the position for at least 15 to 30 seconds. 4. Repeat 2 to 4 times a session, several times a day. Calf-plantar fascia stretch    1. Sit with your legs extended and knees straight. 2. Place a towel around your foot just under the toes. 3. Hold each end of the towel in each hand, with your hands above your knees. 4. Pull back with the towel so that your foot stretches toward you. 5. Hold the position for at least 15 to 30 seconds. 6. Repeat 2 to 4 times a session, up to 5 sessions a day. Floor stretch    1. Stand about 2 feet from a wall, and place your hands on the wall at about shoulder height. Or you can stand behind a chair, placing your hands on the back of it for balance. 2. Step back with the leg you want to stretch. Keep the leg straight, and press your heel into the floor with your toe turned slightly in.  3. Lean forward, and bend your other leg slightly. Feel the stretch in the Achilles tendon of your back leg. Hold for at least 15 to 30 seconds. 4. Repeat 2 to 4 times a session, up to 5 sessions a day. Stair stretch    1. Stand with the balls of both feet on the edge of a step or curb (or a medium-sized phone book). With at least one hand, hold onto something solid for balance, such as a banister or handrail. 2. Keeping your affected leg straight, slowly let that heel hang down off of the step or curb until you feel a stretch in the back of your calf and/or Achilles area.  Some of your weight should still be on the other leg. 3. Hold this position for at least 15 to 30 seconds. 4. Repeat 2 to 4 times a session, up to 5 times a day or whenever your Achilles tendon starts to feel tight. This stretch can also be done with your knee slightly bent. Strength exercise    1. This exercise will get you started on building strength after an Achilles tendon injury. Your doctor or physical therapist can help you move on to more challenging exercises as you heal and get stronger. 2. Stand on a step with your heel off the edge of the step. Hold on to a handrail or wall for balance. 3. Push up on your toes, then slowly count to 10 as you lower yourself back down until your heel is below the step. If it hurts to push up on your toes, try putting most of your weight on your other foot as you push up, or try using your arms to help you. If you can't do this exercise without causing pain, stop the exercise and talk to your doctor. 4. Repeat the exercise 8 to 12 times, half with the knee straight and half with the knee bent. Follow-up care is a key part of your treatment and safety. Be sure to make and go to all appointments, and call your doctor if you are having problems. It's also a good idea to know your test results and keep a list of the medicines you take. Where can you learn more? Go to http://nathalia-kam.info/. Enter D889 in the search box to learn more about \"Achilles Tendon: Exercises. \"  Current as of: March 21, 2017  Content Version: 11.4  © 3418-8783 [employer]. Care instructions adapted under license by [employer] (which disclaims liability or warranty for this information). If you have questions about a medical condition or this instruction, always ask your healthcare professional. [address] any warranty or liability for your use of this information.

## 2017-11-07 NOTE — MR AVS SNAPSHOT
Visit Information Date & Time Provider Department Dept. Phone Encounter #  
 11/7/2017  2:30 PM Carlos Landaverde 0 [facility] [phone] [id number] Follow-up Instructions Return in about 1 month (around 12/7/2017). Your Appointments 11/22/2017  2:15 PM  
ROUTINE CARE with Carlos Landaverde MD  
920 [facility] ([address]) Appt Note: recheck on ear infection [address] [facility]  
[address] [facility] [address] [facility] [address] Upcoming Health Maintenance Date Due  
 EYE EXAM RETINAL OR DILATED Q1 10/12/2017 MEDICARE YEARLY EXAM 2/2/2018 HEMOGLOBIN A1C Q6M 5/4/2018 FOOT EXAM Q1 8/9/2018 GLAUCOMA SCREENING Q2Y 10/12/2018 MICROALBUMIN Q1 11/4/2018 LIPID PANEL Q1 11/4/2018 DTaP/Tdap/Td series (2 - Td) 7/20/2026 Allergies as of 11/7/2017  Review Complete On: 11/7/2017 By: Caitlin Pacheco Severity Noted Reaction Type Reactions Codeine  06/29/2016    Swelling Codeine  07/01/2016    Other (comments) Severe swelling Feldene [Piroxicam]  06/29/2016    Swelling Severe swelling of hands, face and feet. Current Immunizations  Never Reviewed Name Date Influenza Vaccine 8/1/2016 Not reviewed this visit You Were Diagnosed With   
  
 Codes Comments Gastroesophageal reflux disease without esophagitis    -  Primary ICD-10-CM: K21.9 ICD-9-CM: 530.81 Type 2 diabetes mellitus with complication, without long-term current use of insulin (HCC)     ICD-10-CM: E11.8 ICD-9-CM: 250.90 neuropathy Leg swelling     ICD-10-CM: M79.89 ICD-9-CM: 729.81 Essential hypertension with goal blood pressure less than 130/80     ICD-10-CM: I10 
ICD-9-CM: 401.9 Other iron deficiency anemia     ICD-10-CM: D50.8 ICD-9-CM: 280.8 Hypothyroidism, unspecified type     ICD-10-CM: E03.9 ICD-9-CM: 244.9 BMI 29.0-29.9,adult     ICD-10-CM: E81.92 ICD-9-CM: V85.25 Achilles tendon pain     ICD-10-CM: M76.60 ICD-9-CM: 727.89 Other constipation     ICD-10-CM: K59.09 
ICD-9-CM: 564.09 Vitals BP Pulse Temp Resp Height(growth percentile) Weight(growth percentile) 132/66 (BP 1 Location: Left arm, BP Patient Position: Sitting) 72 98.5 °F (36.9 °C) (Oral) 16 5' (1.524 m) 149 lb 3.2 oz (67.7 kg) SpO2 BMI OB Status Smoking Status 99% 29.14 kg/m2 Hysterectomy Never Smoker Vitals History BMI and BSA Data Body Mass Index Body Surface Area  
 29.14 kg/m 2 1.69 m 2 Preferred Pharmacy Pharmacy Name Phone [address] [facility] [phone] Your Updated Medication List  
  
   
This list is accurate as of: 11/7/17  3:39 PM.  Always use your most recent med list.  
  
  
  
  
 alendronate 70 mg tablet Commonly known as:  FOSAMAX TAKE 1 TABLET EVERY 7 DAYS  
  
 amLODIPine 5 mg tablet Commonly known as:  [place] Conine TAKE 1 TABLET DAILY  
  
 aspirin 81 mg chewable tablet Take 81 mg by mouth daily. cholecalciferol 1,000 unit Cap Commonly known as:  VITAMIN D3 Take 1,000 Units by mouth daily. CRANBERRY PLUS VITAMIN C PO Take 4,000 mg by mouth daily. diclofenac 1 % Gel Commonly known as:  VOLTAREN Apply  to affected area four (4) times daily. ferrous sulfate 325 mg (65 mg iron) tablet Commonly known as:  Iron (Ferrous Sulfate) Take 1 Tab by mouth two (2) times a day. furosemide 40 mg tablet Commonly known as:  LASIX Take 1 Tab by mouth daily. Takes 40 mg every morning  
  
 gabapentin 300 mg capsule Commonly known as:  NEURONTIN  
TAKE 1 CAPSULE NIGHTLY Lancets Misc Precision X-tra. Check 1-2 times a day  
  
 levothyroxine 112 mcg tablet Commonly known as:  SYNTHROID Take 112 mcg by mouth Daily (before breakfast). magnesium 250 mg Tab Take 250 mg by mouth daily. metFORMIN 1,000 mg tablet Commonly known as:  GLUCOPHAGE  
TAKE 1 TABLET TWICE A DAY WITH MEALS  
  
 metOLazone 10 mg tablet Commonly known as:  Peter Yolanda Take 5 mg by mouth daily. omega-3 fatty acids-vitamin e 1,000 mg Cap Take 1 Cap by mouth two (2) times a day. 1,200 mg 2 times daily  
  
 pramipexole 0.5 mg tablet Commonly known as:  MIRAPEX TAKE 1 TABLET THREE TIMES A DAY PRECISION XTRA TEST strip Generic drug:  glucose blood VI test strips USE TO CHECK 1 TO 2 TIMES DAILY predniSONE 20 mg tablet Commonly known as:  Merle Luis Take 20 mg by mouth once as needed for Pain (gout). raNITIdine 150 mg tablet Commonly known as:  ZANTAC Take 1 Tab by mouth two (2) times a day. simvastatin 20 mg tablet Commonly known as:  ZOCOR  
TAKE 1 TABLET NIGHTLY  
  
 tiZANidine 4 mg capsule Commonly known as:  Briana Dulce Take 4 mg by mouth nightly. valsartan 160 mg tablet Commonly known as:  DIOVAN  
TAKE 1 TABLET DAILY Prescriptions Sent to Pharmacy Refills  
 raNITIdine (ZANTAC) 150 mg tablet 2 Sig: Take 1 Tab by mouth two (2) times a day. Class: Normal  
 Pharmacy: [address], [address] [place] [address] #: [phone] Route: Oral  
  
Follow-up Instructions Return in about 1 month (around 12/7/2017). Patient Instructions Learning About Diabetes Food Guidelines Your Care Instructions Meal planning is important to manage diabetes. It helps keep your blood sugar at a target level (which you set with your doctor). You don't have to eat special foods. You can eat what your family eats, including sweets once in a while. But you do have to pay attention to how often you eat and how much you eat of certain foods.  
You may want to work with a dietitian or a certified diabetes educator (CDE) to help you plan meals and snacks. A dietitian or CDE can also help you lose weight if that is one of your goals. What should you know about eating carbs? Managing the amount of carbohydrate (carbs) you eat is an important part of healthy meals when you have diabetes. Carbohydrate is found in many foods. · Learn which foods have carbs. And learn the amounts of carbs in different foods. ¨ Bread, cereal, pasta, and rice have about 15 grams of carbs in a serving. A serving is 1 slice of bread (1 ounce), ½ cup of cooked cereal, or 1/3 cup of cooked pasta or rice. ¨ Fruits have 15 grams of carbs in a serving. A serving is 1 small fresh fruit, such as an apple or orange; ½ of a banana; ½ cup of cooked or canned fruit; ½ cup of fruit juice; 1 cup of melon or raspberries; or 2 tablespoons of dried fruit. ¨ Milk and no-sugar-added yogurt have 15 grams of carbs in a serving. A serving is 1 cup of milk or 2/3 cup of no-sugar-added yogurt. ¨ Starchy vegetables have 15 grams of carbs in a serving. A serving is ½ cup of mashed potatoes or sweet potato; 1 cup winter squash; ½ of a small baked potato; ½ cup of cooked beans; or ½ cup cooked corn or green peas. · Learn how much carbs to eat each day and at each meal. A dietitian or CDE can teach you how to keep track of the amount of carbs you eat. This is called carbohydrate counting. · If you are not sure how to count carbohydrate grams, use the Plate Method to plan meals. It is a good, quick way to make sure that you have a balanced meal. It also helps you spread carbs throughout the day. ¨ Divide your plate by types of foods. Put non-starchy vegetables on half the plate, meat or other protein food on one-quarter of the plate, and a grain or starchy vegetable in the final quarter of the plate.  To this you can add a small piece of fruit and 1 cup of milk or yogurt, depending on how many carbs you are supposed to eat at a meal. 
 · Try to eat about the same amount of carbs at each meal. Do not \"save up\" your daily allowance of carbs to eat at one meal. 
· Proteins have very little or no carbs per serving. Examples of proteins are beef, chicken, turkey, fish, eggs, tofu, cheese, cottage cheese, and peanut butter. A serving size of meat is 3 ounces, which is about the size of a deck of cards. Examples of meat substitute serving sizes (equal to 1 ounce of meat) are 1/4 cup of cottage cheese, 1 egg, 1 tablespoon of peanut butter, and ½ cup of tofu. How can you eat out and still eat healthy? · Learn to estimate the serving sizes of foods that have carbohydrate. If you measure food at home, it will be easier to estimate the amount in a serving of restaurant food. · If the meal you order has too much carbohydrate (such as potatoes, corn, or baked beans), ask to have a low-carbohydrate food instead. Ask for a salad or green vegetables. · If you use insulin, check your blood sugar before and after eating out to help you plan how much to eat in the future. · If you eat more carbohydrate at a meal than you had planned, take a walk or do other exercise. This will help lower your blood sugar. What else should you know? · Limit saturated fat, such as the fat from meat and dairy products. This is a healthy choice because people who have diabetes are at higher risk of heart disease. So choose lean cuts of meat and nonfat or low-fat dairy products. Use olive or canola oil instead of butter or shortening when cooking. · Don't skip meals. Your blood sugar may drop too low if you skip meals and take insulin or certain medicines for diabetes. · Check with your doctor before you drink alcohol. Alcohol can cause your blood sugar to drop too low. Alcohol can also cause a bad reaction if you take certain diabetes medicines. Follow-up care is a key part of your treatment and safety.  Be sure to make and go to all appointments, and call your doctor if you are having problems. It's also a good idea to know your test results and keep a list of the medicines you take. Where can you learn more? Go to http://nathalia-kam.info/. Enter D815 in the search box to learn more about \"Learning About Diabetes Food Guidelines. \" Current as of: March 13, 2017 Content Version: 11.4 © 8690-4857 [employer]. Care instructions adapted under license by [employer] (which disclaims liability or warranty for this information). If you have questions about a medical condition or this instruction, always ask your healthcare professional. [address] any warranty or liability for your use of this information. Low Sodium Diet (2,000 Milligram): Care Instructions Your Care Instructions Too much sodium causes your body to hold on to extra water. This can raise your blood pressure and force your heart and kidneys to work harder. In very serious cases, this could cause you to be put in the hospital. It might even be life-threatening. By limiting sodium, you will feel better and lower your risk of serious problems. The most common source of sodium is salt. People get most of the salt in their diet from canned, prepared, and packaged foods. Fast food and restaurant meals also are very high in sodium. Your doctor will probably limit your sodium to less than 2,000 milligrams (mg) a day. This limit counts all the sodium in prepared and packaged foods and any salt you add to your food. Follow-up care is a key part of your treatment and safety. Be sure to make and go to all appointments, and call your doctor if you are having problems. It's also a good idea to know your test results and keep a list of the medicines you take. How can you care for yourself at home? Read food labels · Read labels on cans and food packages.  The labels tell you how much sodium is in each serving. Make sure that you look at the serving size. If you eat more than the serving size, you have eaten more sodium. · Food labels also tell you the Percent Daily Value for sodium. Choose products with low Percent Daily Values for sodium. · Be aware that sodium can come in forms other than salt, including monosodium glutamate (MSG), sodium citrate, and sodium bicarbonate (baking soda). MSG is often added to Asian food. When you eat out, you can sometimes ask for food without MSG or added salt. Buy low-sodium foods · Buy foods that are labeled \"unsalted\" (no salt added), \"sodium-free\" (less than 5 mg of sodium per serving), or \"low-sodium\" (less than 140 mg of sodium per serving). Foods labeled \"reduced-sodium\" and \"light sodium\" may still have too much sodium. Be sure to read the label to see how much sodium you are getting. · Buy fresh vegetables, or frozen vegetables without added sauces. Buy low-sodium versions of canned vegetables, soups, and other canned goods. Prepare low-sodium meals · Cut back on the amount of salt you use in cooking. This will help you adjust to the taste. Do not add salt after cooking. One teaspoon of salt has about 2,300 mg of sodium. · Take the salt shaker off the table. · Flavor your food with garlic, lemon juice, onion, vinegar, herbs, and spices. Do not use soy sauce, lite soy sauce, steak sauce, onion salt, garlic salt, celery salt, mustard, or ketchup on your food. · Use low-sodium salad dressings, sauces, and ketchup. Or make your own salad dressings and sauces without adding salt. · Use less salt (or none) when recipes call for it. You can often use half the salt a recipe calls for without losing flavor. Other foods such as rice, pasta, and grains do not need added salt. · Rinse canned vegetables, and cook them in fresh water. This removes some-but not all-of the salt.  
· Avoid water that is naturally high in sodium or that has been treated with water softeners, which add sodium. Call your local water company to find out the sodium content of your water supply. If you buy bottled water, read the label and choose a sodium-free brand. Avoid high-sodium foods · Avoid eating: ¨ Smoked, cured, salted, and canned meat, fish, and poultry. ¨ Ham, peterson, hot dogs, and luncheon meats. ¨ Regular, hard, and processed cheese and regular peanut butter. ¨ Crackers with salted tops, and other salted snack foods such as pretzels, chips, and salted popcorn. ¨ Frozen prepared meals, unless labeled low-sodium. ¨ Canned and dried soups, broths, and bouillon, unless labeled sodium-free or low-sodium. ¨ Canned vegetables, unless labeled sodium-free or low-sodium. ¨ Western Keli fries, pizza, tacos, and other fast foods. ¨ Pickles, olives, ketchup, and other condiments, especially soy sauce, unless labeled sodium-free or low-sodium. Where can you learn more? Go to http://nathaliaCambridge Endoscopic Deviceskam.info/. Enter C686 in the search box to learn more about \"Low Sodium Diet (2,000 Milligram): Care Instructions. \" Current as of: May 12, 2017 Content Version: 11.4 © 3453-3723 SureDone. Care instructions adapted under license by [employer] (which disclaims liability or warranty for this information). If you have questions about a medical condition or this instruction, always ask your healthcare professional. [place] [address] any warranty or liability for your use of this information. Achilles Tendon: Exercises Your Care Instructions Here are some examples of exercises for your achilles tendon. Start each exercise slowly. Ease off the exercise if you start to have pain. Your doctor or physical therapist will tell you when you can start these exercises and which ones will work best for you. How to do the exercises Toe stretch 1. Sit in a chair, and extend your affected leg so that your heel is on the floor. 2. With your hand, reach down and pull your big toe up and back. Pull toward your ankle and away from the floor. 3. Hold the position for at least 15 to 30 seconds. 4. Repeat 2 to 4 times a session, several times a day. Calf-plantar fascia stretch 1. Sit with your legs extended and knees straight. 2. Place a towel around your foot just under the toes. 3. Hold each end of the towel in each hand, with your hands above your knees. 4. Pull back with the towel so that your foot stretches toward you. 5. Hold the position for at least 15 to 30 seconds. 6. Repeat 2 to 4 times a session, up to 5 sessions a day. Floor stretch 1. Stand about 2 feet from a wall, and place your hands on the wall at about shoulder height. Or you can stand behind a chair, placing your hands on the back of it for balance. 2. Step back with the leg you want to stretch. Keep the leg straight, and press your heel into the floor with your toe turned slightly in. 
3. Lean forward, and bend your other leg slightly. Feel the stretch in the Achilles tendon of your back leg. Hold for at least 15 to 30 seconds. 4. Repeat 2 to 4 times a session, up to 5 sessions a day. Stair stretch 1. Stand with the balls of both feet on the edge of a step or curb (or a medium-sized phone book). With at least one hand, hold onto something solid for balance, such as a banister or handrail. 2. Keeping your affected leg straight, slowly let that heel hang down off of the step or curb until you feel a stretch in the back of your calf and/or Achilles area. Some of your weight should still be on the other leg. 3. Hold this position for at least 15 to 30 seconds. 4. Repeat 2 to 4 times a session, up to 5 times a day or whenever your Achilles tendon starts to feel tight. This stretch can also be done with your knee slightly bent. Strength exercise 1.  This exercise will get you started on building strength after an Achilles tendon injury. Your doctor or physical therapist can help you move on to more challenging exercises as you heal and get stronger. 2. Stand on a step with your heel off the edge of the step. Hold on to a handrail or wall for balance. 3. Push up on your toes, then slowly count to 10 as you lower yourself back down until your heel is below the step. If it hurts to push up on your toes, try putting most of your weight on your other foot as you push up, or try using your arms to help you. If you can't do this exercise without causing pain, stop the exercise and talk to your doctor. 4. Repeat the exercise 8 to 12 times, half with the knee straight and half with the knee bent. Follow-up care is a key part of your treatment and safety. Be sure to make and go to all appointments, and call your doctor if you are having problems. It's also a good idea to know your test results and keep a list of the medicines you take. Where can you learn more? Go to http://nathalia-kam.info/. Enter L075 in the search box to learn more about \"Achilles Tendon: Exercises. \" Current as of: March 21, 2017 Content Version: 11.4 © 1480-7294 Healthwise, [employer]. Care instructions adapted under license by [employer] (which disclaims liability or warranty for this information). If you have questions about a medical condition or this instruction, always ask your healthcare professional. [place] [address] any warranty or liability for your use of this information. Introducing hospitals & HEALTH SERVICES! Dear Jacqueline Mei: Thank you for requesting a [facility] [employer] account. Our records indicate that you already have an active [employer] account. You can access your account anytime at [email or web address] Did you know that you can access your hospital and ER discharge instructions at any time in [facility] [employer]?   You can also review all of your test results from your hospital stay or ER visit. Additional Information If you have questions, please visit the Frequently Asked Questions section of the [employer] website at [email or web address] Remember, [employer] is NOT to be used for urgent needs. For medical emergencies, dial 911. Now available from your iPhone and Android! Please provide this summary of care documentation to your next provider. Your primary care clinician is listed as Laura Merrill. If you have any questions after today's visit, please call [phone].

## 2017-11-07 NOTE — PROGRESS NOTES
1. Have you been to the ER, urgent care clinic since your last visit? Hospitalized since your last visit? No    2. Have you seen or consulted any other health care providers outside of the [address] [place] [address] since your last visit? Include any pap smears or colon screening. No    Patient needs to discuss getting alternate medications; Nexium and Mirapex not on pharmacy formulary. Patient would like to discuss. Patient has not had flu vaccine; will get through pharmacy. Patient had dm eye exam 10/15/17 with [facility].

## 2017-11-08 NOTE — PROGRESS NOTES
HISTORY OF PRESENT ILLNESS  Navdeep Mtz is a 76 y.o. female. HPI: Here for routine follow up and also had few concern. Said she was stretching in the bed as she just woke up and heard a pop sound over rt leg and since then has a pain over rt achillis tendon area. No swelling noted. Able to walk but pain is persistent and mild in nature at this time. Not taken any medication. No radiation of pain anywhere else. Has c/o leg swelling on and off. On lasix. Taking low salt diet. Denies any sob or chest pain. No palpitation. No urinary or bowel complains. Weight has been stable. H/o hypertension. Stable vitals today. Compliant with medication. No side effects. Complaint with low salt diet . Visit Vitals    /66 (BP 1 Location: Left arm, BP Patient Position: Sitting)    Pulse 72    Temp 98.5 °F (36.9 °C) (Oral)    Resp 16    Ht 5' (1.524 m)    Wt 149 lb 3.2 oz (67.7 kg)    SpO2 99%    BMI 29.14 kg/m2     H/o diabetes. Complaint with medication. HBA1C at [address] Home blood sugar is around 100. No hypoglycemia. Compliant with medication. H/o neuropathy. Also chronic back pain. Fairly stable with current medication. Also following Dr. Adrian Roman. Review recent labs. Lab Results   Component Value Date/Time    Hemoglobin A1c 6.3 11/04/2017 10:23 AM    Hemoglobin A1c (POC) 5.9 02/01/2017 03:52 PM     Lab Results   Component Value Date/Time    Sodium 142 07/13/2016 09:51 AM    Potassium 4.1 07/13/2016 09:51 AM    Chloride 102 07/13/2016 09:51 AM    CO2 31 07/13/2016 09:51 AM    Anion gap 9 07/13/2016 09:51 AM    Glucose 124 07/13/2016 09:51 AM    BUN 27 07/13/2016 09:51 AM    Creatinine 1.15 07/13/2016 09:51 AM    BUN/Creatinine ratio 23 07/13/2016 09:51 AM    GFR est AA 55 07/13/2016 09:51 AM    GFR est non-AA 46 07/13/2016 09:51 AM    Calcium 9.9 07/13/2016 09:51 AM    Bilirubin, total 0.5 07/13/2016 09:51 AM    AST (SGOT) 14 07/13/2016 09:51 AM    Alk.  phosphatase 55 07/13/2016 09:51 AM    Protein, total 7.2 07/13/2016 09:51 AM    Albumin 4.1 07/13/2016 09:51 AM    Globulin 3.1 07/13/2016 09:51 AM    A-G Ratio 1.3 07/13/2016 09:51 AM    ALT (SGPT) 19 07/13/2016 09:51 AM     Lab Results   Component Value Date/Time    Cholesterol, total 169 11/04/2017 10:23 AM    HDL Cholesterol 49 11/04/2017 10:23 AM    LDL, calculated 87.2 11/04/2017 10:23 AM    VLDL, calculated 32.8 11/04/2017 10:23 AM    Triglyceride 164 11/04/2017 10:23 AM    CHOL/HDL Ratio 3.4 11/04/2017 10:23 AM     Lab Results   Component Value Date/Time    WBC 7.6 07/13/2016 09:51 AM    HGB 11.1 07/13/2016 09:51 AM    HCT 32.9 07/13/2016 09:51 AM    PLATELET 466 60/24/2840 09:51 AM    MCV 85.2 07/13/2016 09:51 AM     Lab Results   Component Value Date/Time    TSH 0.01 10/26/2016 05:10 PM     Lab Results   Component Value Date/Time    Microalbumin/Creat ratio (mg/g creat) 58 11/04/2017 10:23 AM    Microalbumin,urine random 6.93 11/04/2017 10:23 AM   has h/o hypothyroidism. Following endocrinologist and adjusted medication by them. On synthroid. Has on and off constipation. Advised to take miralax otc. Also GERD symptoms on and off. Taking nexium but it is going out of formulary from the pharmacy so changed it to zantac. Diet modification discussed. ROS: see HPI     Physical Exam   Constitutional: She is oriented to person, place, and time. No distress. Cardiovascular: Normal rate, regular rhythm and normal heart sounds. Pulmonary/Chest:   CTA   Abdominal: Soft. Bowel sounds are normal. There is no tenderness. Musculoskeletal: She exhibits no edema. Rt foot: localized tenderness over achilles tendon. No swelling or redness. ROM wnl but discomfort with flexion. Neurological: She is oriented to person, place, and time. Psychiatric: Her behavior is normal.       ASSESSMENT and PLAN    ICD-10-CM ICD-9-CM    1. Gastroesophageal reflux disease without esophagitis: change nexium to ranitidine as not covered formulary.  Also discussed diet modification. K21.9 530.81 raNITIdine (ZANTAC) 150 mg tablet   2. Type 2 diabetes mellitus with complication, without long-term current use of insulin (HCC)/ : well controlled. Continue current plan. E11.8 250.90     neuropathy    3. Leg swelling: stable with lasix. Low salt diet. Leg elevation and also daily weight monitoring. M79.89 729.81    4. Essential hypertension with goal blood pressure less than 130/80: stable at this time. Low salt diet. Exercise as tolerated. Will continue current plan. I10 401.9    5. Other iron deficiency anemia: on iron supplement. D50.8 280.8    6. Hypothyroidism, unspecified type: on medication. Will be following endocrinologist.  E03.9 244.9    7. BMI 29.0-29.9,adult: diet modification discussed. Exercise limitation due to back pain and neuropathy. Will observe. Z68.29 V85.25    8. Achilles tendon pain: for now given home exercise. Ice application. Already on NSAID . Advised to take as needed. M76.60 727.89    9. Other constipation: donnie lax as needed. K59.09 564.09    Pt understood and agree with the plan   Review HM   Follow-up Disposition:  Return in about 1 month (around 12/7/2017).

## 2017-11-09 ENCOUNTER — TELEPHONE (OUTPATIENT)
Dept: FAMILY MEDICINE CLINIC | Age: 77
End: 2017-11-09

## 2017-11-09 NOTE — TELEPHONE ENCOUNTER
Pt states that [facility] will not fill her RX for  Zantac unless its a 90 day supply. Could you please send a new prescription to them? Please let pt know when it has been fixed. Thank you.

## 2017-11-10 DIAGNOSIS — K21.9 GASTROESOPHAGEAL REFLUX DISEASE WITHOUT ESOPHAGITIS: ICD-10-CM

## 2017-11-10 RX ORDER — RANITIDINE 150 MG/1
150 TABLET, FILM COATED ORAL 2 TIMES DAILY
Qty: 180 TAB | Refills: 1 | Status: SHIPPED | OUTPATIENT
Start: 2017-11-10

## 2017-11-17 ENCOUNTER — TELEPHONE (OUTPATIENT)
Dept: FAMILY MEDICINE CLINIC | Age: 77
End: 2017-11-17

## 2017-11-17 NOTE — TELEPHONE ENCOUNTER
Caleb from [facility] [place] 2[place] called this morning. She states patient has a upper lid blepharoplasty scheduled on 1/4/18. She would like to know if patient can stop her aspirin 2 weeks prior to procedure. Ph# [phone].

## 2017-11-17 NOTE — TELEPHONE ENCOUNTER
Left a message on Ms Baldev Owensing voice mail that per Dr Celestina Sigala patient can stop taking aspirin 2 weeks prior to her surgery and that should she have any further concerns to contact the office.

## 2017-11-28 RX ORDER — SIMVASTATIN 20 MG/1
TABLET, FILM COATED ORAL
Qty: 90 TAB | Refills: 0 | Status: SHIPPED | OUTPATIENT
Start: 2017-11-28 | End: 2018-02-26 | Stop reason: SDUPTHER

## 2018-01-09 ENCOUNTER — TELEPHONE (OUTPATIENT)
Dept: FAMILY MEDICINE CLINIC | Age: 78
End: 2018-01-09

## 2018-01-09 DIAGNOSIS — K21.9 GASTROESOPHAGEAL REFLUX DISEASE WITHOUT ESOPHAGITIS: Primary | ICD-10-CM

## 2018-01-09 RX ORDER — PANTOPRAZOLE SODIUM 20 MG/1
20 TABLET, DELAYED RELEASE ORAL DAILY
Qty: 30 TAB | Refills: 0 | Status: SHIPPED | OUTPATIENT
Start: 2018-01-09 | End: 2018-01-10 | Stop reason: SDUPTHER

## 2018-01-09 NOTE — TELEPHONE ENCOUNTER
Given protonix. Please ask her to get seen if problem persist and try to accommodate her in the schedule this week. Thanks.

## 2018-01-09 NOTE — TELEPHONE ENCOUNTER
Called patient. No answer. Left message for patient to return my call. Office hours 8 am- 5 pm the rest of the week.

## 2018-01-09 NOTE — TELEPHONE ENCOUNTER
Pt states that the RX Ranitidine is not working. She states that she is nauseous all the time and sometimes projectile vomits suddenly. She would like to know what else she could try. Please advise.

## 2018-01-10 ENCOUNTER — TELEPHONE (OUTPATIENT)
Dept: FAMILY MEDICINE CLINIC | Age: 78
End: 2018-01-10

## 2018-01-10 DIAGNOSIS — K21.9 GASTROESOPHAGEAL REFLUX DISEASE WITHOUT ESOPHAGITIS: ICD-10-CM

## 2018-01-10 RX ORDER — PANTOPRAZOLE SODIUM 20 MG/1
20 TABLET, DELAYED RELEASE ORAL DAILY
Qty: 30 TAB | Refills: 0 | Status: SHIPPED | OUTPATIENT
Start: 2018-01-10 | End: 2018-01-16 | Stop reason: SDUPTHER

## 2018-01-10 NOTE — TELEPHONE ENCOUNTER
Patient identified with 2 identifiers (name and ). Patient aware that Dr. Cele Al switched medication to protonix. Patient needs medication sent to [facility].  Patient advise if this medication does not work to schedule follow up with Dr. Cele Al

## 2018-01-16 ENCOUNTER — TELEPHONE (OUTPATIENT)
Dept: FAMILY MEDICINE CLINIC | Age: 78
End: 2018-01-16

## 2018-01-16 DIAGNOSIS — K21.9 GASTROESOPHAGEAL REFLUX DISEASE WITHOUT ESOPHAGITIS: ICD-10-CM

## 2018-01-16 RX ORDER — PANTOPRAZOLE SODIUM 20 MG/1
20 TABLET, DELAYED RELEASE ORAL DAILY
Qty: 30 TAB | Refills: 0 | Status: SHIPPED | OUTPATIENT
Start: 2018-01-16 | End: 2019-02-08

## 2018-01-16 NOTE — TELEPHONE ENCOUNTER
Spoke with patient (2 verifiers name/) regarding her RX for pantoprazole 20 mg. Patient informed that [facility] has shipped the medication on 2018 and per the tracking number the medication should be in her mailbox today (2018). Patient informed should she not have her medication to call [facility] and have them contact [facility] to get an override so that they can fill the medication for her. The number for the pharmacy help line is [phone]. Patient voiced understanding.

## 2018-01-16 NOTE — TELEPHONE ENCOUNTER
Pt states that [facility] can't fill this RX Protonix 20 mg because it is saying that it has been filled somewhere else. She states that it had been sent to [facility] accidentally. She asks if you will call [facility] or [facility] and let them know that the RX pelletier'ts been filled yet. Please advise.

## 2018-01-30 DIAGNOSIS — M79.18 MYOFASCIAL PAIN: ICD-10-CM

## 2018-01-30 DIAGNOSIS — Z87.39 H/O OSTEOPENIA: ICD-10-CM

## 2018-01-30 DIAGNOSIS — M47.899 FACET SYNDROME: ICD-10-CM

## 2018-01-30 RX ORDER — ALENDRONATE SODIUM 70 MG/1
TABLET ORAL
Qty: 12 TAB | Refills: 0 | Status: SHIPPED | OUTPATIENT
Start: 2018-01-30

## 2018-02-11 DIAGNOSIS — I10 ESSENTIAL HYPERTENSION WITH GOAL BLOOD PRESSURE LESS THAN 130/80: ICD-10-CM

## 2018-02-13 RX ORDER — AMLODIPINE BESYLATE 5 MG/1
TABLET ORAL
Qty: 90 TAB | Refills: 1 | Status: SHIPPED | OUTPATIENT
Start: 2018-02-13

## 2018-02-26 RX ORDER — SIMVASTATIN 20 MG/1
TABLET, FILM COATED ORAL
Qty: 90 TAB | Refills: 0 | Status: ON HOLD | OUTPATIENT
Start: 2018-02-26 | End: 2019-03-01 | Stop reason: ALTCHOICE

## 2018-04-11 DIAGNOSIS — I10 ESSENTIAL HYPERTENSION WITH GOAL BLOOD PRESSURE LESS THAN 130/80: ICD-10-CM

## 2018-04-11 RX ORDER — VALSARTAN 160 MG/1
TABLET ORAL
Qty: 90 TAB | Refills: 0 | Status: SHIPPED | OUTPATIENT
Start: 2018-04-11

## 2018-04-11 NOTE — TELEPHONE ENCOUNTER
Over due for follow up. Please ask pt to schedule an appt. For now giving 90 days supply . michael .

## 2018-09-18 ENCOUNTER — HOSPITAL ENCOUNTER (OUTPATIENT)
Dept: GENERAL RADIOLOGY | Age: 78
Discharge: HOME | End: 2018-09-18
Payer: MEDICARE

## 2018-09-18 DIAGNOSIS — M54.6 THORACIC BACK PAIN: ICD-10-CM

## 2018-09-18 DIAGNOSIS — R07.9 CHEST PAIN: ICD-10-CM

## 2018-09-18 PROCEDURE — 72070 X-RAY EXAM THORAC SPINE 2VWS: CPT

## 2018-09-18 PROCEDURE — 71046 X-RAY EXAM CHEST 2 VIEWS: CPT

## 2018-11-29 ENCOUNTER — OFFICE VISIT (OUTPATIENT)
Dept: ORTHOPEDIC SURGERY | Age: 78
End: 2018-11-29

## 2018-11-29 VITALS
BODY MASS INDEX: 30.63 KG/M2 | HEIGHT: 60 IN | SYSTOLIC BLOOD PRESSURE: 147 MMHG | DIASTOLIC BLOOD PRESSURE: 71 MMHG | WEIGHT: 156 LBS | HEART RATE: 78 BPM

## 2018-11-29 DIAGNOSIS — R29.898 BILATERAL LEG WEAKNESS: ICD-10-CM

## 2018-11-29 DIAGNOSIS — M40.04 POSTURAL KYPHOSIS OF THORACIC REGION: ICD-10-CM

## 2018-11-29 DIAGNOSIS — M54.59 MECHANICAL LOW BACK PAIN: ICD-10-CM

## 2018-11-29 DIAGNOSIS — R20.0 BILATERAL LEG NUMBNESS: ICD-10-CM

## 2018-11-29 DIAGNOSIS — M79.18 MYOFASCIAL PAIN: ICD-10-CM

## 2018-11-29 DIAGNOSIS — M47.816 LUMBAR FACET ARTHROPATHY: ICD-10-CM

## 2018-11-29 DIAGNOSIS — M48.062 SPINAL STENOSIS OF LUMBAR REGION WITH NEUROGENIC CLAUDICATION: Primary | ICD-10-CM

## 2018-11-29 DIAGNOSIS — M54.50 LUMBAR SPINE PAIN: ICD-10-CM

## 2018-11-29 DIAGNOSIS — Z86.73 HISTORY OF STROKE: ICD-10-CM

## 2018-11-29 RX ORDER — PREDNISONE 10 MG/1
TABLET ORAL
Qty: 21 TAB | Refills: 0 | Status: SHIPPED | OUTPATIENT
Start: 2018-11-29 | End: 2018-11-29 | Stop reason: SDUPTHER

## 2018-11-29 RX ORDER — PREDNISONE 10 MG/1
TABLET ORAL
Qty: 21 TAB | Refills: 0 | Status: SHIPPED | OUTPATIENT
Start: 2018-11-29 | End: 2019-02-07 | Stop reason: ALTCHOICE
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
## 2018-11-29 NOTE — PATIENT INSTRUCTIONS
Electromyogram (EMG) and Nerve Conduction Studies: About These Tests  What are they? An electromyogram (EMG) measures the electrical activity of your muscles when you are not using them (at rest) and when you tighten them (muscle contraction). Nerve conduction studies (NCS) measure how well and how fast the nerves can send electrical signals. EMG and nerve conduction studies are often done together. If they are done together, the nerve conduction studies are done before the EMG. Why are they done? You may need an EMG to find diseases that damage your muscles or nerves or to find why you cannot move your muscles (paralysis), why they feel weak, or why they twitch. You may need nerve conduction studies to find damage to the nerves that lead from the brain and spinal cord to the rest of the body (peripheral nervous system). Nerve conduction studies are often used to help find nerve disorders, such as carpal tunnel syndrome. How can you prepare for these tests? · Tell your doctors ALL the medicines, vitamins, supplements, and herbal remedies you take. Some medicines can affect the test results. You may need to stop taking some medicines before you have this test.     · If you take aspirin or some other blood thinner, be sure to talk to your doctor. He or she will tell you if you should stop taking it before your test. Make sure that you understand exactly what your doctor wants you to do.     · Wear loose-fitting clothing. You may be given a hospital gown to wear.     · The electrodes for the test are attached to your skin. Your skin needs to be clean and free of sprays, oils, creams, and lotions. What happens during the tests? You lie on a table or bed or sit in a reclining chair so your muscles are relaxed. For an EMG:  · Your doctor will insert a needle electrode into a muscle.  This will record the electrical activity while the muscle is at rest. You may feel a quick, sharp pain when the needle electrode is put into a muscle. · Your doctor will ask you to tighten the same muscle slowly and steadily while the electrical activity is recorded. · Your doctor may move the electrode to a different area of the muscle or a different muscle. For nerve conduction studies:  · Your doctor will attach two types of electrodes to your skin. ? One type of electrode is placed over a nerve and will give the nerve an electrical pulse. ? The other type of electrode is placed over the muscle that the nerve controls. It will record how long it takes the muscle to react to the electrical pulse. · You will be able to feel the electrical pulses. They are small shocks and are safe. What else should you know about these tests? · After an EMG, you may be sore and have a tingling feeling in your muscles for up to 2 days. You may have small bruises or swelling at the needle site. · For an EMG, you may be asked to sign a consent form. Talk to your doctor about any concerns you have about the need for the test, its risks, how it will be done, or what the results will mean. How long do they take? · An EMG may take 30 to 60 minutes. · Nerve conduction tests may take from 15 minutes to 1 hour or more. It depends on how many nerves and muscles your doctor tests. What happens after these tests? · If any of the test areas are sore:  ? Put ice or a cold pack on the area for 10 to 20 minutes at a time. Put a thin cloth between the ice and your skin. ? Take an over-the-counter pain medicine, such as acetaminophen (Tylenol), ibuprofen (Advil, Motrin), or naproxen (Aleve). Be safe with medicines. Read and follow all instructions on the label. · You will probably be able to go home right away. · You can go back to your usual activities right away. When should you call for help?   Watch closely for changes in your health, and be sure to contact your doctor if:  · Muscle pain from an EMG test gets worse or you have swelling, tenderness, or pus at any of the needle sites. · You have any problems that you think may be from the test.  · You have any questions about the test or have not received your results. Follow-up care is a key part of your treatment and safety. Be sure to make and go to all appointments, and call your doctor if you are having problems. It's also a good idea to keep a list of the medicines you take. Ask your doctor when you can expect to have your test results. Where can you learn more? Go to http://nathalia-kam.info/. Enter B370 in the search box to learn more about \"Electromyogram (EMG) and Nerve Conduction Studies: About These Tests. \"  Current as of: June 4, 2018  Content Version: 11.8  © 5501-0853 [employer]. Care instructions adapted under license by [employer] (which disclaims liability or warranty for this information). If you have questions about a medical condition or this instruction, always ask your healthcare professional. [address] any warranty or liability for your use of this information.

## 2019-01-04 ENCOUNTER — OFFICE VISIT (OUTPATIENT)
Dept: ORTHOPEDIC SURGERY | Age: 79
End: 2019-01-04

## 2019-01-04 VITALS
WEIGHT: 157.8 LBS | SYSTOLIC BLOOD PRESSURE: 176 MMHG | TEMPERATURE: 98 F | BODY MASS INDEX: 29.79 KG/M2 | OXYGEN SATURATION: 98 % | HEIGHT: 61 IN | DIASTOLIC BLOOD PRESSURE: 83 MMHG | HEART RATE: 81 BPM | RESPIRATION RATE: 14 BRPM

## 2019-01-04 DIAGNOSIS — R29.898 BILATERAL LEG WEAKNESS: Primary | ICD-10-CM

## 2019-01-04 DIAGNOSIS — R20.0 BILATERAL LEG NUMBNESS: ICD-10-CM

## 2019-01-04 DIAGNOSIS — M48.062 SPINAL STENOSIS OF LUMBAR REGION WITH NEUROGENIC CLAUDICATION: ICD-10-CM

## 2019-01-04 NOTE — PROGRESS NOTES
[address]  [address]  [address]  Phone: [phone]  Fax: [phone]        Duarte Chavesona  : 1940  PCP: Nelli Kendall MD  2019    ELECTROMYOGRAPHY AND NERVE CONDUCTION STUDIES    Cortney Serrano was referred by Dr. Sobeida Guillen for electrodiagnostic evaluation of numbness and weakness in her BLE. NCV & EMG Findings:  Evaluation of the left Fibular motor nerve showed reduced amplitude (0.7 mV). The left tibial motor nerve showed no response (Ankle) and no response (Knee). The left Sup Fibular sensory nerve showed no response (14 cm) and no response (Site 2). The right Sup Fibular sensory nerve showed no response (14 cm), no response (Site 2), and no response (Site 3). The left sural sensory and the right sural sensory nerves showed no response (Calf) and no response (Site 2). All remaining nerves (as indicated in the following tables) were within normal limits. Left vs. Right side comparison data for the Fibular motor nerve indicates abnormal L-R amplitude difference (78.8 %). Needle evaluation of the right tensor fascia nimo muscle showed moderately increased polyphasic potentials. The right gastroc muscle showed increased insertional activity, slightly increased spontaneous activity, and moderately increased polyphasic potentials. The left tensor fascia nimo muscle showed increased insertional activity, increased spontaneous activity, and slightly increased polyphasic potentials. The left vastus medialis muscle showed slightly increased polyphasic potentials. The left gastroc muscle showed increased insertional activity, slightly increased spontaneous activity, and slightly increased polyphasic potentials. The left rectus femoris, the left mid lumbosacral paraspinal, and the left low lumbosacral paraspinal muscles showed increased insertional activity and slightly increased spontaneous activity. All remaining muscles (as indicated in the following table) showed no evidence of electrical instability. SPECIAL CONSIDERATIONS  Edema in BLE resulted in a technically difficult study. INTERPRETATION  This is an abnormal electrodiagnostic examination. These findings may be consistent with peripheral polyneuropathy and lumbar polyradiculopathy consistent with spinal stenosis affecting the cauda equina. This is based on mild spontaneous activity throughout lumbar with evidence of subacutely compensated nerve injury. The peripheral neuropathy is evidenced by diffuse sensory and motor abnormalities on NCS. CLINICAL INTERPRETATION  Her spinal stenosis appears to be resulting in lumbar polyradiculopathy which may explain her functional symptom of difficulty standing. PLAN  I referred for an updated MRI. We will likely consider a referral to Dr. Pema Porras for a surgical consult. Pt will f/u following MRI. HISTORY OF PRESENT ILLNESS  Sofie Howell is a [de-identified] y.o. female. She c/o chronic low back pain that is worse with standing 10-15 minutes and BLE weakness and numbness. She has had difficulty walking due to the weakness in her BLE and has even had a fall (September). She has a hx of strokes and spinal stenosis evidenced on her MRI. She also has a hx of neuropathy and diabetes. She reports weakness climbing the stairs. She has had this weakness for years, but it \"got really bad\" about three years ago and has been accompanied by falls. She reports that she recently had an episode that lasted 2-3 minutes where she felt a pain in her legs that went all the way up into her head that caused a severe headache like her head was \"going to explode. \"     PAST MEDICAL HISTORY   Past Medical History:   Diagnosis Date    Acid reflux     Cancer ([facility] [address]     uterine    Diabetes ([address]     Dizzy spells     Essential hypertension     Gallbladder attack     Hearing loss     High cholesterol     Hypertension     Memory loss     Sinus complaint     SOB (shortness of breath)     Thyroid disease        Past Surgical History:   Procedure Laterality Date    HX APPENDECTOMY      HX CATARACT REMOVAL      HX CHOLECYSTECTOMY      HX HYSTERECTOMY      partial hysterectomy    HX ORTHOPAEDIC      5 back surgeries    HX ORTHOPAEDIC      hand arm and left knee    NEUROLOGICAL PROCEDURE UNLISTED  1993    Compression Fracture Surgery    NEUROLOGICAL PROCEDURE UNLISTED  1994    NEUROLOGICAL PROCEDURE UNLISTED  1995   . MEDICATIONS    Current Outpatient Medications   Medication Sig Dispense Refill    predniSONE (STERAPRED DS) 10 mg dose pack See administration instruction per 10mg dose pack 21 Tab 0    valsartan (DIOVAN) 160 mg tablet TAKE 1 TABLET DAILY 90 Tab 0    simvastatin (ZOCOR) 20 mg tablet TAKE 1 TABLET NIGHTLY 90 Tab 0    amLODIPine (NORVASC) 5 mg tablet TAKE 1 TABLET DAILY 90 Tab 1    metFORMIN (GLUCOPHAGE) 1,000 mg tablet TAKE 1 TABLET TWICE A DAY WITH MEALS 180 Tab 0    alendronate (FOSAMAX) 70 mg tablet TAKE 1 TABLET EVERY 7 DAYS 12 Tab 0    gabapentin (NEURONTIN) 300 mg capsule TAKE 1 CAPSULE NIGHTLY 30 Cap 0    pantoprazole (PROTONIX) 20 mg tablet Take 1 Tab by mouth daily. 30 Tab 0    raNITIdine (ZANTAC) 150 mg tablet Take 1 Tab by mouth two (2) times a day. 180 Tab 1    PRECISION XTRA TEST strip USE TO CHECK 1 TO 2 TIMES DAILY 100 Strip 6    metOLazone (ZAROXOLYN) 10 mg tablet Take 5 mg by mouth daily.  ferrous sulfate (IRON, FERROUS SULFATE,) 325 mg (65 mg iron) tablet Take 1 Tab by mouth two (2) times a day. (Patient taking differently: Take 325 mg by mouth daily.) 60 Tab 2    pramipexole (MIRAPEX) 0.5 mg tablet TAKE 1 TABLET THREE TIMES A  Tab 0    levothyroxine (SYNTHROID) 112 mcg tablet Take 135 mcg by mouth Daily (before breakfast).  diclofenac (VOLTAREN) 1 % gel Apply  to affected area four (4) times daily.  5 Each 2    magnesium 250 mg tab Take 250 mg by mouth daily.  furosemide (LASIX) 40 mg tablet Take 1 Tab by mouth daily. Takes 40 mg every morning (Patient taking differently: Take 40 mg by mouth daily as needed. Takes 40 mg every morning) 30 Tab 0    tiZANidine (ZANAFLEX) 4 mg capsule Take 4 mg by mouth nightly.  Lancets misc Precision X-tra. Check 1-2 times a day 100 Each 6    aspirin 81 mg chewable tablet Take 81 mg by mouth daily.  omega-3 fatty acids-vitamin e 1,000 mg cap Take 1 Cap by mouth two (2) times a day. 1,200 mg 2 times daily      cholecalciferol (VITAMIN D3) 1,000 unit cap Take 1,000 Units by mouth daily.  CRANBERRY CONC/ASCORBIC ACID (CRANBERRY PLUS VITAMIN C PO) Take 4,000 mg by mouth daily. ALLERGIES  Allergies   Allergen Reactions    Codeine Swelling    Codeine Other (comments)     Severe swelling    Feldene [Piroxicam] Swelling     Severe swelling of hands, face and feet.           SOCIAL HISTORY    Social History     Socioeconomic History    Marital status:      Spouse name: Not on file    Number of children: Not on file    Years of education: Not on file    Highest education level: Not on file   Tobacco Use    Smoking status: Never Smoker    Smokeless tobacco: Never Used   Substance and Sexual Activity    Alcohol use: No     Alcohol/week: 0.0 oz    Drug use: No    Sexual activity: No   Social History Narrative    ** Merged History Encounter **            FAMILY HISTORY  Family History   Problem Relation Age of Onset    Cancer Mother 76        Cervical Cancer    Asthma Father     Heart Attack Father     Cancer Maternal Grandmother         colon cancer    Diabetes Maternal Grandmother     Hypertension Maternal Grandmother     Breast Cancer Daughter         two daughters    Diabetes Paternal Grandmother     Heart Disease Paternal Grandmother     Other Maternal Aunt         mental illness due to accident during infancy         PHYSICAL EXAMINATION  Visit Vitals  /83   Pulse 81 Temp 98 °F (36.7 °C) (Oral)   Resp 14   Ht 5' 1\" (1.549 m)   Wt 157 lb 12.8 oz (71.6 kg)   SpO2 98%   BMI 29.82 kg/m²       Pain Assessment  1/4/2019   Location of Pain Leg   Location Modifiers Right;Left   Severity of Pain 10   Quality of Pain Aching; Sharp;Burning   Quality of Pain Comment -   Duration of Pain Persistent   Frequency of Pain Constant   Aggravating Factors -   Limiting Behavior -   Relieving Factors -   Result of Injury No           Constitutional:  Well developed, well nourished, in no acute distress. Psychiatric: Affect and mood are appropriate. Integumentary: No rashes or abrasions noted on exposed areas. SPINE/MUSCULOSKELETAL EXAM    DTRs are 2+ biceps, triceps, brachioradialis and none patella and Achilles. On brief examination:     Cervical spine:  Neck is midline.    Normal muscle tone.    No focal atrophy is noted.    ROM pain free.    Shoulder ROM intact.    No tenderness to palpation. Negative Spurling's sign.    Negative Tinel's sign.    Negative Randall's sign.        Sensation in the bilateral arms grossly intact to light touch.        Lumbar spine:  No rash, ecchymosis, or gross obliquity.    No fasciculations.    No focal atrophy is noted.    No pain with hip ROM.    Limited range of motion. Tenderness to palpation, R>L. No tenderness to palpation at the sciatic notch.    SI joints non-tender.    Trochanters non tender. Pain with back extension. Negative sitting slump test bilaterally.      Sensation in the bilateral legs grossly intact to light touch.     DTRs are 2+ biceps, triceps, brachioradialis, patella, and Achilles.     Updates from 11/29/18:  DTRs are 2+ biceps, triceps, brachioradialis and none patella and Achilles. Weakness in bilateral thighs  Tenderness to palpation of thoracic paraspinals.     MOTOR:      Biceps  Triceps Deltoids Wrist Ext Wrist Flex Hand Intrin   Right 5/5 5/5 5/5 5/5 5/5 5/5   Left 5/5 5/5 5/5 5/5 5/5 5/5             Hip Flex  Quads Hamstrings Ankle DF EHL Ankle PF   Right 5/5 4+/5 5/5 5/5 5/5 5/5   Left 5/5 4+/5 5/5 5/5 5/5 5/5     NCV & EMG Findings:  Nerve Conduction Studies  Anti Sensory Summary Table     Stim Site NR Peak (ms) Norm Peak (ms) O-P Amp (µV) Norm O-P Amp Site1 Site2 Delta-P (ms) Dist (cm) Ajay (m/s) Norm Ajay (m/s)   Left Sup Fibular Anti Sensory (Ant Lat Mall)   14 cm NR  <4.4  >5.0 14 cm Ant Lat Mall  14.0  >32   Site 2 NR             Right Sup Fibular Anti Sensory (Ant Lat Mall)   14 cm NR  <4.4  >5.0 14 cm Ant Lat Mall  14.0  >32   Site 2 NR             Site 3 NR             Left Sural Anti Sensory (Lat Mall)   Calf NR  <4.0  >5.0 Calf Lat Mall  14.0  >35   Site 2 NR             Right Sural Anti Sensory (Lat Mall)   Calf NR  <4.0  >5.0 Calf Lat Mall  14.0  >35   Site 2 NR               Motor Summary Table     Stim Site NR Onset (ms) Norm Onset (ms) O-P Amp (mV) Norm O-P Amp Site1 Site2 Delta-0 (ms) Dist (cm) Ajay (m/s) Norm Ajay (m/s)   Left Fibular Motor (Ext Dig Brev)   Ankle    5.0 <6.1 0.7 >2.5 B Fib Ankle 6.0 27.0 45 >38   B Fib    11.0  0.8  Poplt B Fib 0.9 6.0 67 >40   Poplt    11.9  1.1          Right Fibular Motor (Ext Dig Brev)   Ankle    4.0 <6.1 3.3 >2.5 B Fib Ankle 5.7 28.5 50 >38   B Fib    9.7  2.8  Poplt B Fib 0.9 6.0 67 >40   Poplt    10.6  3.0          Left Tibial Motor (Abd Rodriguez Brev)   Ankle NR  <6.1  >3.0 Knee Ankle  0.0  >35   Knee NR             Right Tibial Motor (Abd Rodriguez Brev)   Ankle    4.0 <6.1 4.2 >3.0 Knee Ankle 6.4 0.0  >35   Knee    10.4  3.8            EMG     Side Muscle Nerve Root Ins Act Fibs Psw Amp Dur Poly Recrt Int Galloway Dredge Comment   Right TensorFascLat SupGluteal L4-5, S1 Nml Nml Nml Nml Nml 2+ Nml Nml    Right VastusMed Femoral L2-4 Nml Nml Nml Nml Nml 0 Nml Nml    Right AntTibialis Dp Br Fibular L4-5 Nml Nml Nml Nml Nml 0 Nml Nml    Right Gastroc Tibial S1-2 Incr 1+ 1+ Nml Nml 2+ Nml Nml    Left TensorFascLat SupGluteal L4-5, S1 Incr 2+ 3+ Nml Nml 1+ Nml Nml    Left VastusMed Femoral L2-4 Nml Nml Nml Nml Nml 1+ Nml Nml    Left AntTibialis Dp Br Fibular L4-5 Nml Nml Nml Nml Nml 0 Nml Nml    Left Gastroc Tibial S1-2 Incr Nml 1+ Nml Nml 1+ Nml Nml    Left RectFemoris Femoral L2-4 Incr 1+ 1+ Nml Nml 0 Nml Nml CRDs   Right RectFemoris Femoral L2-4 Nml Nml Nml Nml Nml 0 Nml Nml    Right Lumbo Parasp Up Rami L1-2 Nml Nml Nml         Right Lumbo Parasp Mid Rami L3-4 Nml Nml Nml         Right Lumbo Parasp Low Rami L5-S1 Nml Nml Nml         Left Lumbo Parasp Up Rami L1-2 Nml Nml Nml         Left Lumbo Parasp Mid Rami L3-4 Incr Nml 1+         Left Lumbo Parasp Low Rami L5-S1 Incr Nml 1+             Nerve Conduction Studies  Anti Sensory Left/Right Comparison     Stim Site L Lat (ms) R Lat (ms) L-R Lat (ms) L Amp (µV) R Amp (µV) L-R Amp (%) Site1 Site2 L Ajay (m/s) R Ajay (m/s) L-R Ajay (m/s)   Sup Fibular Anti Sensory (Ant Lat Mall)   14 cm       14 cm Ant Lat Mall      Site 2              Sural Anti Sensory (Lat Mall)   Calf       Calf Lat Mall      Site 2                Motor Left/Right Comparison     Stim Site L Lat (ms) R Lat (ms) L-R Lat (ms) L Amp (mV) R Amp (mV) L-R Amp (%) Site1 Site2 L Ajay (m/s) R Ajay (m/s) L-R Ajay (m/s)   Fibular Motor (Ext Dig Brev)   Ankle 5.0 4.0 1.0 0.7 3.3 78.8 B Fib Ankle 45 50 5   B Fib 11.0 9.7 1.3 0.8 2.8 71.4 Poplt B Fib 67 67 0   Poplt 11.9 10.6 1.3 1.1 3.0 63.3        Tibial Motor (Abd Rodriguez Brev)   Ankle  4.0   4.2  Knee Ankle      Knee  10.4   3.8               Waveforms:                            [facility]  OFFICE PROCEDURE PROGRESS NOTE        Chart reviewed for the following:   Brigida DOTSON, have reviewed the History, Physical and updated the Allergic reactions for [place] performed immediately prior to start of procedure:   Brigida DOTSON, have performed the following reviews on [address] [place] [address] prior to the start of the procedure:            * Patient was identified by name and date of birth   * Agreement on procedure being performed was verified  * Risks and Benefits explained to the patient  * Procedure site verified and marked as necessary  * Patient was positioned for comfort  * Consent was signed and verified     Time: 12:45pm      Date of procedure: 1/4/2019    Procedure performed by:  Cedric Calloway MD    Provider accompanied by: Guero. Patient accompanied by: None.     How tolerated by patient: tolerated the procedure well with no complications    Post Procedural Pain Scale: 0 - No Hurt    Comments: none    Written by Dearl Manohar, [facility] as dictated by Wallace Pham MD

## 2019-01-19 ENCOUNTER — HOSPITAL ENCOUNTER (OUTPATIENT)
Dept: MRI IMAGING | Age: 79
Discharge: HOME | End: 2019-01-19
Attending: PHYSICAL MEDICINE & REHABILITATION
Payer: MEDICARE

## 2019-01-19 DIAGNOSIS — R29.898 BILATERAL LEG WEAKNESS: ICD-10-CM

## 2019-01-19 DIAGNOSIS — R20.0 BILATERAL LEG NUMBNESS: ICD-10-CM

## 2019-01-19 DIAGNOSIS — M48.062 SPINAL STENOSIS OF LUMBAR REGION WITH NEUROGENIC CLAUDICATION: ICD-10-CM

## 2019-01-19 PROCEDURE — 72148 MRI LUMBAR SPINE W/O DYE: CPT

## 2019-02-07 ENCOUNTER — OFFICE VISIT (OUTPATIENT)
Dept: ORTHOPEDIC SURGERY | Age: 79
End: 2019-02-07

## 2019-02-07 VITALS
SYSTOLIC BLOOD PRESSURE: 104 MMHG | HEIGHT: 61 IN | BODY MASS INDEX: 28.7 KG/M2 | HEART RATE: 87 BPM | DIASTOLIC BLOOD PRESSURE: 61 MMHG | WEIGHT: 152 LBS

## 2019-02-07 DIAGNOSIS — M48.062 SPINAL STENOSIS OF LUMBAR REGION WITH NEUROGENIC CLAUDICATION: ICD-10-CM

## 2019-02-07 DIAGNOSIS — E11.42 DIABETIC PERIPHERAL NEUROPATHY (HCC): ICD-10-CM

## 2019-02-07 DIAGNOSIS — M54.16 LUMBAR RADICULOPATHY: ICD-10-CM

## 2019-02-07 DIAGNOSIS — R29.898 BILATERAL LEG WEAKNESS: Primary | ICD-10-CM
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
## 2019-02-08 ENCOUNTER — APPOINTMENT (OUTPATIENT)
Dept: ULTRASOUND IMAGING | Age: 79
DRG: 684 | End: 2019-02-08
Attending: INTERNAL MEDICINE
Payer: MEDICARE

## 2019-02-08 ENCOUNTER — HOSPITAL ENCOUNTER (INPATIENT)
Age: 79
LOS: 3 days | Discharge: NURSING, REHAB OR LONG-TERM CARE | DRG: 684 | End: 2019-02-11
Attending: EMERGENCY MEDICINE | Admitting: FAMILY MEDICINE
Payer: MEDICARE

## 2019-02-08 ENCOUNTER — APPOINTMENT (OUTPATIENT)
Dept: GENERAL RADIOLOGY | Age: 79
DRG: 684 | End: 2019-02-08
Attending: PHYSICIAN ASSISTANT
Payer: MEDICARE

## 2019-02-08 DIAGNOSIS — N17.9 ACUTE RENAL FAILURE, UNSPECIFIED ACUTE RENAL FAILURE TYPE (HCC): Primary | ICD-10-CM

## 2019-02-08 DIAGNOSIS — E11.8 TYPE 2 DIABETES MELLITUS WITH COMPLICATION, WITHOUT LONG-TERM CURRENT USE OF INSULIN (HCC): ICD-10-CM

## 2019-02-08 DIAGNOSIS — E86.0 DEHYDRATION: ICD-10-CM

## 2019-02-08 DIAGNOSIS — R33.9 URINARY RETENTION: ICD-10-CM

## 2019-02-08 PROBLEM — N13.9 URINARY OBSTRUCTION: Status: ACTIVE | Noted: 2019-02-08

## 2019-02-08 LAB
ALBUMIN SERPL-MCNC: 4.1 G/DL (ref 3.4–5)
ALBUMIN/GLOB SERPL: 1.2 {RATIO} (ref 0.8–1.7)
ALP SERPL-CCNC: 68 U/L (ref 45–117)
ALT SERPL-CCNC: 25 U/L (ref 13–56)
AMORPH CRY URNS QL MICRO: ABNORMAL
ANION GAP SERPL CALC-SCNC: 12 MMOL/L (ref 3–18)
APPEARANCE UR: ABNORMAL
AST SERPL-CCNC: 21 U/L (ref 15–37)
BACTERIA URNS QL MICRO: ABNORMAL /HPF
BASOPHILS # BLD: 0 K/UL (ref 0–0.1)
BASOPHILS NFR BLD: 0 % (ref 0–2)
BILIRUB SERPL-MCNC: 0.4 MG/DL (ref 0.2–1)
BILIRUB UR QL: NEGATIVE
BUN SERPL-MCNC: 57 MG/DL (ref 7–18)
BUN/CREAT SERPL: 13 (ref 12–20)
CALCIUM SERPL-MCNC: 9 MG/DL (ref 8.5–10.1)
CHLORIDE SERPL-SCNC: 103 MMOL/L (ref 100–108)
CK SERPL-CCNC: 207 U/L (ref 26–192)
CO2 SERPL-SCNC: 22 MMOL/L (ref 21–32)
COLOR UR: YELLOW
CREAT SERPL-MCNC: 4.39 MG/DL (ref 0.6–1.3)
CREAT UR-MCNC: 246 MG/DL (ref 30–125)
DIFFERENTIAL METHOD BLD: ABNORMAL
EOSINOPHIL # BLD: 0.5 K/UL (ref 0–0.4)
EOSINOPHIL NFR BLD: 5 % (ref 0–5)
EPITH CASTS URNS QL MICRO: ABNORMAL /LPF (ref 0–5)
ERYTHROCYTE [DISTWIDTH] IN BLOOD BY AUTOMATED COUNT: 14.4 % (ref 11.6–14.5)
FLUAV AG NPH QL IA: NEGATIVE
FLUBV AG NOSE QL IA: NEGATIVE
GLOBULIN SER CALC-MCNC: 3.4 G/DL (ref 2–4)
GLUCOSE BLD STRIP.AUTO-MCNC: 205 MG/DL (ref 70–110)
GLUCOSE SERPL-MCNC: 152 MG/DL (ref 74–99)
GLUCOSE UR STRIP.AUTO-MCNC: NEGATIVE MG/DL
HCT VFR BLD AUTO: 32.2 % (ref 35–45)
HGB BLD-MCNC: 10.9 G/DL (ref 12–16)
HGB UR QL STRIP: NEGATIVE
KETONES UR QL STRIP.AUTO: ABNORMAL MG/DL
LEUKOCYTE ESTERASE UR QL STRIP.AUTO: ABNORMAL
LIPASE SERPL-CCNC: 212 U/L (ref 73–393)
LYMPHOCYTES # BLD: 1.7 K/UL (ref 0.9–3.6)
LYMPHOCYTES NFR BLD: 19 % (ref 21–52)
MCH RBC QN AUTO: 28.2 PG (ref 24–34)
MCHC RBC AUTO-ENTMCNC: 33.9 G/DL (ref 31–37)
MCV RBC AUTO: 83.4 FL (ref 74–97)
MONOCYTES # BLD: 0.8 K/UL (ref 0.05–1.2)
MONOCYTES NFR BLD: 9 % (ref 3–10)
NEUTS SEG # BLD: 5.9 K/UL (ref 1.8–8)
NEUTS SEG NFR BLD: 67 % (ref 40–73)
NITRITE UR QL STRIP.AUTO: NEGATIVE
PH UR STRIP: 5 [PH] (ref 5–8)
PLATELET # BLD AUTO: 228 K/UL (ref 135–420)
PMV BLD AUTO: 9.5 FL (ref 9.2–11.8)
POTASSIUM SERPL-SCNC: 3.9 MMOL/L (ref 3.5–5.5)
PROT SERPL-MCNC: 7.5 G/DL (ref 6.4–8.2)
PROT UR STRIP-MCNC: NEGATIVE MG/DL
RBC # BLD AUTO: 3.86 M/UL (ref 4.2–5.3)
RBC #/AREA URNS HPF: NEGATIVE /HPF (ref 0–5)
SODIUM SERPL-SCNC: 137 MMOL/L (ref 136–145)
SODIUM UR-SCNC: 38 MMOL/L (ref 20–110)
SP GR UR REFRACTOMETRY: 1.02 (ref 1–1.03)
UROBILINOGEN UR QL STRIP.AUTO: 0.2 EU/DL (ref 0.2–1)
WBC # BLD AUTO: 8.9 K/UL (ref 4.6–13.2)
WBC URNS QL MICRO: ABNORMAL /HPF (ref 0–4)

## 2019-02-08 PROCEDURE — 80053 COMPREHEN METABOLIC PANEL: CPT

## 2019-02-08 PROCEDURE — 77030005514 HC CATH URETH FOL14 BARD -A

## 2019-02-08 PROCEDURE — 87086 URINE CULTURE/COLONY COUNT: CPT

## 2019-02-08 PROCEDURE — 87077 CULTURE AEROBIC IDENTIFY: CPT

## 2019-02-08 PROCEDURE — 99284 EMERGENCY DEPT VISIT MOD MDM: CPT

## 2019-02-08 PROCEDURE — 74011250636 HC RX REV CODE- 250/636: Performed by: PHYSICIAN ASSISTANT

## 2019-02-08 PROCEDURE — 76770 US EXAM ABDO BACK WALL COMP: CPT

## 2019-02-08 PROCEDURE — 96360 HYDRATION IV INFUSION INIT: CPT

## 2019-02-08 PROCEDURE — 87804 INFLUENZA ASSAY W/OPTIC: CPT

## 2019-02-08 PROCEDURE — 82962 GLUCOSE BLOOD TEST: CPT

## 2019-02-08 PROCEDURE — 51702 INSERT TEMP BLADDER CATH: CPT

## 2019-02-08 PROCEDURE — 82550 ASSAY OF CK (CPK): CPT

## 2019-02-08 PROCEDURE — 83690 ASSAY OF LIPASE: CPT

## 2019-02-08 PROCEDURE — 65270000029 HC RM PRIVATE

## 2019-02-08 PROCEDURE — 81001 URINALYSIS AUTO W/SCOPE: CPT

## 2019-02-08 PROCEDURE — 74011636637 HC RX REV CODE- 636/637: Performed by: FAMILY MEDICINE

## 2019-02-08 PROCEDURE — 87186 SC STD MICRODIL/AGAR DIL: CPT

## 2019-02-08 PROCEDURE — 85025 COMPLETE CBC W/AUTO DIFF WBC: CPT

## 2019-02-08 PROCEDURE — 82570 ASSAY OF URINE CREATININE: CPT

## 2019-02-08 PROCEDURE — 51798 US URINE CAPACITY MEASURE: CPT

## 2019-02-08 PROCEDURE — 71046 X-RAY EXAM CHEST 2 VIEWS: CPT

## 2019-02-08 PROCEDURE — 84300 ASSAY OF URINE SODIUM: CPT

## 2019-02-08 RX ORDER — SIMVASTATIN 20 MG/1
20 TABLET, FILM COATED ORAL
Status: DISCONTINUED | OUTPATIENT
Start: 2019-02-08 | End: 2019-02-11 | Stop reason: HOSPADM

## 2019-02-08 RX ORDER — LEVOTHYROXINE SODIUM 125 UG/1
125 TABLET ORAL
Status: DISCONTINUED | OUTPATIENT
Start: 2019-02-09 | End: 2019-02-08 | Stop reason: SDUPTHER

## 2019-02-08 RX ORDER — LEVOTHYROXINE SODIUM 137 UG/1
137 TABLET ORAL
COMMUNITY

## 2019-02-08 RX ORDER — DOXYCYCLINE 100 MG/1
100 CAPSULE ORAL 2 TIMES DAILY
Qty: 20 CAP | Refills: 0 | Status: SHIPPED | OUTPATIENT
Start: 2019-02-08 | End: 2019-02-11

## 2019-02-08 RX ORDER — FAMOTIDINE 20 MG/1
20 TABLET, FILM COATED ORAL DAILY
Status: DISCONTINUED | OUTPATIENT
Start: 2019-02-08 | End: 2019-02-11 | Stop reason: HOSPADM

## 2019-02-08 RX ORDER — ACETAMINOPHEN 325 MG/1
650 TABLET ORAL
Status: DISCONTINUED | OUTPATIENT
Start: 2019-02-08 | End: 2019-02-11 | Stop reason: HOSPADM

## 2019-02-08 RX ORDER — AMLODIPINE BESYLATE 5 MG/1
5 TABLET ORAL DAILY
Status: DISCONTINUED | OUTPATIENT
Start: 2019-02-09 | End: 2019-02-11 | Stop reason: HOSPADM

## 2019-02-08 RX ORDER — HEPARIN SODIUM 5000 [USP'U]/ML
5000 INJECTION, SOLUTION INTRAVENOUS; SUBCUTANEOUS EVERY 8 HOURS
Status: DISCONTINUED | OUTPATIENT
Start: 2019-02-08 | End: 2019-02-11 | Stop reason: HOSPADM

## 2019-02-08 RX ORDER — INSULIN LISPRO 100 [IU]/ML
INJECTION, SOLUTION INTRAVENOUS; SUBCUTANEOUS
Status: DISCONTINUED | OUTPATIENT
Start: 2019-02-08 | End: 2019-02-11 | Stop reason: HOSPADM

## 2019-02-08 RX ORDER — PREDNISONE 20 MG/1
20 TABLET ORAL
COMMUNITY

## 2019-02-08 RX ORDER — LEVOTHYROXINE SODIUM 125 UG/1
125 TABLET ORAL
COMMUNITY
End: 2019-02-11

## 2019-02-08 RX ORDER — LEVOTHYROXINE SODIUM 125 UG/1
125 TABLET ORAL
Status: DISCONTINUED | OUTPATIENT
Start: 2019-02-09 | End: 2019-02-11 | Stop reason: HOSPADM

## 2019-02-08 RX ORDER — SODIUM CHLORIDE 9 MG/ML
100 INJECTION, SOLUTION INTRAVENOUS CONTINUOUS
Status: DISCONTINUED | OUTPATIENT
Start: 2019-02-08 | End: 2019-02-10

## 2019-02-08 RX ORDER — DEXTROSE 50 % IN WATER (D50W) INTRAVENOUS SYRINGE
25-50 AS NEEDED
Status: DISCONTINUED | OUTPATIENT
Start: 2019-02-08 | End: 2019-02-11 | Stop reason: HOSPADM

## 2019-02-08 RX ORDER — MAGNESIUM SULFATE 100 %
16 CRYSTALS MISCELLANEOUS AS NEEDED
Status: DISCONTINUED | OUTPATIENT
Start: 2019-02-08 | End: 2019-02-11 | Stop reason: HOSPADM

## 2019-02-08 RX ADMIN — INSULIN LISPRO 4 UNITS: 100 INJECTION, SOLUTION INTRAVENOUS; SUBCUTANEOUS at 22:11

## 2019-02-08 RX ADMIN — SODIUM CHLORIDE 1000 ML: 900 INJECTION, SOLUTION INTRAVENOUS at 12:49

## 2019-02-08 NOTE — CONSULTS
Consult Note      Consult requested by: Afia Hooks MD    ADMIT DATE: 2/8/2019    CONSULT DATE: February 8, 2019           Admission diagnosis: decreased urine output   Reason for Nephrology Consultation: LOLIS    Assessment:   1 acute kidney injury on chronic kidney disease stage III  Etiology: Prerenal azotemia versus ATN in the setting of poor intake/diarrhea/relative hypotension. Patient also was taking valsartan as well as Diovan every day which could have contributed. We will get urine studies including urine sodium, urine creatinine, urine eosinophils to evaluate for acute interstitial nephritis. We will also obtain CKs to evaluate for rhabdomyolysis. Renal ultrasound was done awaiting report. Hydrate the patient, give a bolus of Plasma-Lyte, and then continue maintenance at 125 mL an hour  Check  strict ins and outs, continue to hold valsartan , Diovan and metformin. Also hold Lasix  2 flulike symptoms/gastroenteritis: Presently mildly dehydrated on exam, volume resuscitation as above, symptomatic treatment deferred to primary team  3 diabetes mellitus: Deferred to primary team  4 hypothyroidism, continue on levothyroxine    Please call with questions,    Verenice Narayan MD [facility]  Cell [phone]  Pager: [phone]      HPI:   Patient is a 78-year-old female with past medical history of diabetes, hypertension, dyslipidemia, spinal stenosis, hypothyroidism presented with decreased urine output. Per patient for the last couple of days she has been having nausea vomiting diarrhea and poor intake she did have flulike symptoms and was not able to keep down any fluids. She mentions that she continue taking her medications still including her blood pressure medicines. She noted that her urine output had dropped and she was not able to urinate. Denies any blood in urine, fever or flank pain. Denies taking any new medications, denies taking any NSAIDs. .       Past Medical History:   Diagnosis Date    Acid reflux     Cancer ([facility]     uterine    Diabetes ([facility]     Dizzy spells     Essential hypertension     Gallbladder attack     Hearing loss     High cholesterol     Hypertension     Memory loss     Sinus complaint     SOB (shortness of breath)     Thyroid disease       Past Surgical History:   Procedure Laterality Date    HX APPENDECTOMY      HX CATARACT REMOVAL      HX CHOLECYSTECTOMY      HX HYSTERECTOMY      partial hysterectomy    HX ORTHOPAEDIC      5 back surgeries    HX ORTHOPAEDIC      hand arm and left knee    NEUROLOGICAL PROCEDURE UNLISTED  1993    Compression Fracture Surgery    NEUROLOGICAL PROCEDURE UNLISTED  1994    NEUROLOGICAL PROCEDURE UNLISTED  1995       Social History     Socioeconomic History    Marital status:      Spouse name: Not on file    Number of children: Not on file    Years of education: Not on file    Highest education level: Not on file   Social Needs    Financial resource strain: Not on file    Food insecurity - worry: Not on file    Food insecurity - inability: Not on file   [employer] needs - medical: Not on file   [employer] needs - non-medical: Not on file   Occupational History    Not on file   Tobacco Use    Smoking status: Never Smoker    Smokeless tobacco: Never Used   Substance and Sexual Activity    Alcohol use: No     Alcohol/week: 0.0 oz    Drug use: No    Sexual activity: No   Other Topics Concern    Not on file   Social History Narrative    ** Merged History Encounter **            Family History   Problem Relation Age of Onset    Cancer Mother 76        Cervical Cancer    Asthma Father     Heart Attack Father     Cancer Maternal Grandmother         colon cancer    Diabetes Maternal Grandmother     Hypertension Maternal Grandmother     Breast Cancer Daughter         two daughters    Diabetes Paternal Grandmother     Heart Disease Paternal Grandmother     Other Maternal Aunt         mental illness due to accident during infancy     Allergies   Allergen Reactions    Codeine Swelling    Codeine Other (comments)     Severe swelling    Feldene [Piroxicam] Swelling     Severe swelling of hands, face and feet. Home Medications:     (Not in a hospital admission)    Current Inpatient Medications:     Current Facility-Administered Medications   Medication Dose Route Frequency    0.9% sodium chloride infusion  100 mL/hr IntraVENous CONTINUOUS    [START ON 2/9/2019] amLODIPine (NORVASC) tablet 5 mg  5 mg Oral DAILY    famotidine (PEPCID) tablet 20 mg  20 mg Oral DAILY    simvastatin (ZOCOR) tablet 20 mg  20 mg Oral QHS    acetaminophen (TYLENOL) tablet 650 mg  650 mg Oral Q4H PRN    heparin (porcine) injection 5,000 Units  5,000 Units SubCUTAneous Q8H    [START ON 2/9/2019] levothyroxine (SYNTHROID) tablet 125 mcg  125 mcg Oral 6am    insulin lispro (HUMALOG) injection   SubCUTAneous AC&HS    glucose chewable tablet 16 g  16 g Oral PRN    glucagon (GLUCAGEN) injection 1 mg  1 mg IntraMUSCular PRN    dextrose (D50W) injection syrg 12.5-25 g  25-50 mL IntraVENous PRN     Current Outpatient Medications   Medication Sig    SITagliptin (JANUVIA) 50 mg tablet Take 50 mg by mouth two (2) times a day.  valsartan (DIOVAN) 160 mg tablet TAKE 1 TABLET DAILY    simvastatin (ZOCOR) 20 mg tablet TAKE 1 TABLET NIGHTLY    amLODIPine (NORVASC) 5 mg tablet TAKE 1 TABLET DAILY    metFORMIN (GLUCOPHAGE) 1,000 mg tablet TAKE 1 TABLET TWICE A DAY WITH MEALS    alendronate (FOSAMAX) 70 mg tablet TAKE 1 TABLET EVERY 7 DAYS    gabapentin (NEURONTIN) 300 mg capsule TAKE 1 CAPSULE NIGHTLY    raNITIdine (ZANTAC) 150 mg tablet Take 1 Tab by mouth two (2) times a day.  pramipexole (MIRAPEX) 0.5 mg tablet TAKE 1 TABLET THREE TIMES A DAY    furosemide (LASIX) 40 mg tablet Take 1 Tab by mouth daily. Takes 40 mg every morning (Patient taking differently: Take 40 mg by mouth daily as needed.  Takes 40 mg every morning)    tiZANidine (ZANAFLEX) 4 mg capsule Take 4 mg by mouth nightly.  levothyroxine (SYNTHROID) 125 mcg tablet Take 125 mcg by mouth Daily (before breakfast). Take one tab every other day before breakfast    levothyroxine (SYNTHROID) 137 mcg tablet Take 137 mcg by mouth Daily (before breakfast). Take one tablet every other day, alternating with levothyroxine 125mcg    predniSONE (DELTASONE) 20 mg tablet Take 20 mg by mouth daily as needed for Other (for gout flares).  PRECISION XTRA TEST strip USE TO CHECK 1 TO 2 TIMES DAILY    Lancets misc Precision X-tra. Check 1-2 times a day       Review of Systems:    No cough or hemoptysis. No shortness of breath or chest pain. No orthopnea or paroxysmal nocturnal dyspnea. No ankle swelling, no joint paints. No muscle aches. No skin changes. Physical Assessment:     Vitals:    02/08/19 1118   BP: 125/55   Pulse: 83   Resp: 16   Temp: 97.9 °F (36.6 °C)   SpO2: 98%   Weight: 68.5 kg (151 lb)   Height: 5' 1\" (1.549 m)     Last 3 Recorded Weights in this Encounter    02/08/19 1118   Weight: 68.5 kg (151 lb)     Admission weight: Weight: 68.5 kg (151 lb) (02/08/19 1118)    No intake or output data in the 24 hours ending 02/08/19 1616    Patient is in no apparent distress. HEENT: Head is normocephalic and atraumatic. Lungs: good air entry, clear to auscultation bilaterally  Cardiovascular system: S1, S2, regular rate and rhythm  Abdomen: soft, non tender, non distended. No CVA tenderness  Extremities: no clubbing, cyanosis or edema.       Data Review:    Labs: Results:       Chemistry Recent Labs     02/08/19  1240   *      K 3.9      CO2 22   BUN 57*   CREA 4.39*   CA 9.0   AGAP 12   BUCR 13   AP 68   TP 7.5   ALB 4.1   GLOB 3.4   AGRAT 1.2         CBC w/Diff Recent Labs     02/08/19  1240   WBC 8.9   RBC 3.86*   HGB 10.9*   HCT 32.2*      GRANS 67   LYMPH 19*   EOS 5         Iron/Ferritin No results for input(s): IRON in the last 72 hours. No lab exists for component: TIBCCALC   PTH/VIT D No results for input(s): PTH in the last 72 hours.     No lab exists for component: VITD           Gracy Graham MD  2/8/2019  4:16 PM      February 8, 2019

## 2019-02-08 NOTE — ED TRIAGE NOTES
Pt. States \"I've had the flu and I've been throwing; the last two days I've been pushing fluids and I still cannot go to the bathroom\".

## 2019-02-08 NOTE — ED PROVIDER NOTES
EMERGENCY DEPARTMENT HISTORY AND PHYSICAL EXAM    12:28 PM      Date: 2/8/2019  Patient Name: Lamarr Dubin    History of Presenting Illness     Chief Complaint   Patient presents with    Urinary Retention    Cough         History Provided By: Patient      Additional History (Context): Lamarr Dubin is a [de-identified] y.o. female with PMHx of Uterine cancer, DM, and HTN who presents with urinary retention onset 2 days ago. Patient reports she has urinated 3 times within the last 2 days, and describes it as a \"trickling out. \" Reports a \"fullness\" sensation, but denies pain. Denies hematuria, and dysuria. Denies hx of urinary retention. Reports a low-grade fever, but is not concerned about the symptom. Patient reports she experienced N/V 3 days ago when she had the Flu, but states it has resolved. Reports diarrhea for 1 day after she had the nausea and vomiting, but states it has resolved. Denies constipation. Denies urinary incontinence, bowel incontinence, and saddle anesthesia. Also reports hx of back pain. Reports currently experiencing the back pain. Reports recent MRI of her back. No new trauma or features. States she is scheduled to receive spinal injections in 4 days in order to manage the pain. Denies to be on medications for the pain. Denies flank pain. Denies hx of kidney stones. No other concerns were expressed at this time. PCP: Daryl Hirsch MD    Chief Complaint: Urinary retention  Duration:  2 days   Timing:  Constant  Location:  region   Quality: Fullness  Severity: Patient denies pain   Modifying Factors: No modifying factors were reported. Associated Symptoms: Patient also reports chronic back pain and a low-grade fever. Denies other associated symptoms.      Current Facility-Administered Medications   Medication Dose Route Frequency Provider Last Rate Last Dose    0.9% sodium chloride infusion  100 mL/hr IntraVENous CONTINUOUS Raza Paz PA-C         Current Outpatient Medications   Medication Sig Dispense Refill    valsartan (DIOVAN) 160 mg tablet TAKE 1 TABLET DAILY 90 Tab 0    simvastatin (ZOCOR) 20 mg tablet TAKE 1 TABLET NIGHTLY 90 Tab 0    amLODIPine (NORVASC) 5 mg tablet TAKE 1 TABLET DAILY 90 Tab 1    metFORMIN (GLUCOPHAGE) 1,000 mg tablet TAKE 1 TABLET TWICE A DAY WITH MEALS 180 Tab 0    alendronate (FOSAMAX) 70 mg tablet TAKE 1 TABLET EVERY 7 DAYS 12 Tab 0    gabapentin (NEURONTIN) 300 mg capsule TAKE 1 CAPSULE NIGHTLY 30 Cap 0    pantoprazole (PROTONIX) 20 mg tablet Take 1 Tab by mouth daily. 30 Tab 0    raNITIdine (ZANTAC) 150 mg tablet Take 1 Tab by mouth two (2) times a day. 180 Tab 1    PRECISION XTRA TEST strip USE TO CHECK 1 TO 2 TIMES DAILY 100 Strip 6    metOLazone (ZAROXOLYN) 10 mg tablet Take 5 mg by mouth daily.  ferrous sulfate (IRON, FERROUS SULFATE,) 325 mg (65 mg iron) tablet Take 1 Tab by mouth two (2) times a day. (Patient taking differently: Take 325 mg by mouth daily.) 60 Tab 2    pramipexole (MIRAPEX) 0.5 mg tablet TAKE 1 TABLET THREE TIMES A  Tab 0    levothyroxine (SYNTHROID) 112 mcg tablet Take 135 mcg by mouth Daily (before breakfast).  diclofenac (VOLTAREN) 1 % gel Apply  to affected area four (4) times daily. 5 Each 2    magnesium 250 mg tab Take 250 mg by mouth daily.  furosemide (LASIX) 40 mg tablet Take 1 Tab by mouth daily. Takes 40 mg every morning (Patient taking differently: Take 40 mg by mouth daily as needed. Takes 40 mg every morning) 30 Tab 0    tiZANidine (ZANAFLEX) 4 mg capsule Take 4 mg by mouth nightly.  Lancets misc Precision X-tra. Check 1-2 times a day 100 Each 6    aspirin 81 mg chewable tablet Take 81 mg by mouth daily.  omega-3 fatty acids-vitamin e 1,000 mg cap Take 1 Cap by mouth two (2) times a day. 1,200 mg 2 times daily      cholecalciferol (VITAMIN D3) 1,000 unit cap Take 1,000 Units by mouth daily.       CRANBERRY CONC/ASCORBIC ACID (CRANBERRY PLUS VITAMIN C PO) Take 4,000 mg by mouth daily. Past History     Past Medical History:  Past Medical History:   Diagnosis Date    Acid reflux     Cancer ([address]     uterine    Diabetes ([address]     Dizzy spells     Essential hypertension     Gallbladder attack     Hearing loss     High cholesterol     Hypertension     Memory loss     Sinus complaint     SOB (shortness of breath)     Thyroid disease        Past Surgical History:  Past Surgical History:   Procedure Laterality Date    HX APPENDECTOMY      HX CATARACT REMOVAL      HX CHOLECYSTECTOMY      HX HYSTERECTOMY      partial hysterectomy    HX ORTHOPAEDIC      5 back surgeries    HX ORTHOPAEDIC      hand arm and left knee    NEUROLOGICAL PROCEDURE UNLISTED  1993    Compression Fracture Surgery    NEUROLOGICAL PROCEDURE UNLISTED  1994    NEUROLOGICAL PROCEDURE UNLISTED  1995       Family History:  Family History   Problem Relation Age of Onset    Cancer Mother 76        Cervical Cancer    Asthma Father     Heart Attack Father     Cancer Maternal Grandmother         colon cancer    Diabetes Maternal Grandmother     Hypertension Maternal Grandmother     Breast Cancer Daughter         two daughters    Diabetes Paternal Grandmother     Heart Disease Paternal Grandmother     Other Maternal Aunt         mental illness due to accident during infancy       Social History:  Social History     Tobacco Use    Smoking status: Never Smoker    Smokeless tobacco: Never Used   Substance Use Topics    Alcohol use: No     Alcohol/week: 0.0 oz    Drug use: No       Allergies: Allergies   Allergen Reactions    Codeine Swelling    Codeine Other (comments)     Severe swelling    Feldene [Piroxicam] Swelling     Severe swelling of hands, face and feet. Review of Systems     Review of Systems   Constitutional: Positive for fever. Negative for chills. HENT: Negative for ear pain, rhinorrhea and sore throat. Eyes: Negative for pain and redness. Respiratory: Negative for cough and shortness of breath. Cardiovascular: Negative for chest pain. Gastrointestinal: Negative for abdominal pain, constipation, diarrhea, nausea and vomiting. Genitourinary: Positive for decreased urine volume. Negative for dysuria, flank pain, frequency and hematuria. (-) Urinary incontinence   (-) Bowel incontinence    Musculoskeletal: Positive for back pain (chronic ). Skin: Negative. Negative for rash. Neurological: Negative for dizziness, tremors, seizures, syncope, speech difficulty, weakness, light-headedness, numbness and headaches. Psychiatric/Behavioral: Negative. All other systems reviewed and are negative. Physical Exam     Visit Vitals  /55 (BP 1 Location: Left arm, BP Patient Position: Sitting)   Pulse 83   Temp 97.9 °F (36.6 °C)   Resp 16   Ht 5' 1\" (1.549 m)   Wt 68.5 kg (151 lb)   SpO2 98%   BMI 28.53 kg/m²       Physical Exam   Constitutional: She is oriented to person, place, and time. She appears well-developed and well-nourished. No distress. HENT:   Head: Normocephalic and atraumatic. Right Ear: Tympanic membrane, external ear and ear canal normal.   Left Ear: Tympanic membrane, external ear and ear canal normal.   Nose: Nose normal.   Mouth/Throat: Oropharynx is clear and moist and mucous membranes are normal. No oropharyngeal exudate. Eyes: Conjunctivae and EOM are normal. Pupils are equal, round, and reactive to light. No scleral icterus. Neck: Normal range of motion. Neck supple. No JVD present. Cardiovascular: Normal rate, regular rhythm and normal heart sounds. Exam reveals no gallop and no friction rub. No murmur heard. Pulmonary/Chest: Effort normal and breath sounds normal. No accessory muscle usage. No respiratory distress. She has no decreased breath sounds. She has no wheezes. She has no rhonchi. She has no rales. Abdominal: Soft.  Normal appearance and bowel sounds are normal. She exhibits no distension. There is no tenderness. There is no rigidity, no rebound, no guarding, no CVA tenderness, no tenderness at McBurney's point and negative Mata's sign. Musculoskeletal: Normal range of motion. Lymphadenopathy:     She has no cervical adenopathy. Neurological: She is alert and oriented to person, place, and time. Skin: Skin is warm and dry. She is not diaphoretic. Psychiatric: She has a normal mood and affect. Her behavior is normal. Judgment and thought content normal.   Nursing note and vitals reviewed. Diagnostic Study Results     Labs -  Recent Results (from the past 12 hour(s))   URINALYSIS W/ RFLX MICROSCOPIC    Collection Time: 02/08/19 12:40 PM   Result Value Ref Range    Color YELLOW      Appearance CLOUDY      Specific gravity 1.017 1.005 - 1.030      pH (UA) 5.0 5.0 - 8.0      Protein NEGATIVE  NEG mg/dL    Glucose NEGATIVE  NEG mg/dL    Ketone TRACE (A) NEG mg/dL    Bilirubin NEGATIVE  NEG      Blood NEGATIVE  NEG      Urobilinogen 0.2 0.2 - 1.0 EU/dL    Nitrites NEGATIVE  NEG      Leukocyte Esterase MODERATE (A) NEG     CBC WITH AUTOMATED DIFF    Collection Time: 02/08/19 12:40 PM   Result Value Ref Range    WBC 8.9 4.6 - 13.2 K/uL    RBC 3.86 (L) 4.20 - 5.30 M/uL    HGB 10.9 (L) 12.0 - 16.0 g/dL    HCT 32.2 (L) 35.0 - 45.0 %    MCV 83.4 74.0 - 97.0 FL    MCH 28.2 24.0 - 34.0 PG    MCHC 33.9 31.0 - 37.0 g/dL    RDW 14.4 11.6 - 14.5 %    PLATELET 997 925 - 024 K/uL    MPV 9.5 9.2 - 11.8 FL    NEUTROPHILS 67 40 - 73 %    LYMPHOCYTES 19 (L) 21 - 52 %    MONOCYTES 9 3 - 10 %    EOSINOPHILS 5 0 - 5 %    BASOPHILS 0 0 - 2 %    ABS. NEUTROPHILS 5.9 1.8 - 8.0 K/UL    ABS. LYMPHOCYTES 1.7 0.9 - 3.6 K/UL    ABS. MONOCYTES 0.8 0.05 - 1.2 K/UL    ABS. EOSINOPHILS 0.5 (H) 0.0 - 0.4 K/UL    ABS.  BASOPHILS 0.0 0.0 - 0.1 K/UL    DF AUTOMATED     METABOLIC PANEL, COMPREHENSIVE    Collection Time: 02/08/19 12:40 PM   Result Value Ref Range    Sodium 137 136 - 145 mmol/L    Potassium 3.9 3.5 - 5.5 mmol/L    Chloride 103 100 - 108 mmol/L    CO2 22 21 - 32 mmol/L    Anion gap 12 3.0 - 18 mmol/L    Glucose 152 (H) 74 - 99 mg/dL    BUN 57 (H) 7.0 - 18 MG/DL    Creatinine 4.39 (H) 0.6 - 1.3 MG/DL    BUN/Creatinine ratio 13 12 - 20      GFR est AA 12 (L) >60 ml/min/1.73m2    GFR est non-AA 10 (L) >60 ml/min/1.73m2    Calcium 9.0 8.5 - 10.1 MG/DL    Bilirubin, total 0.4 0.2 - 1.0 MG/DL    ALT (SGPT) 25 13 - 56 U/L    AST (SGOT) 21 15 - 37 U/L    Alk. phosphatase 68 45 - 117 U/L    Protein, total 7.5 6.4 - 8.2 g/dL    Albumin 4.1 3.4 - 5.0 g/dL    Globulin 3.4 2.0 - 4.0 g/dL    A-G Ratio 1.2 0.8 - 1.7     LIPASE    Collection Time: 02/08/19 12:40 PM   Result Value Ref Range    Lipase 212 73 - 393 U/L   URINE MICROSCOPIC ONLY    Collection Time: 02/08/19 12:40 PM   Result Value Ref Range    WBC 45 to 50 0 - 4 /hpf    RBC NEGATIVE  0 - 5 /hpf    Epithelial cells 1+ 0 - 5 /lpf    Bacteria 2+ (A) NEG /hpf    Amorphous Crystals 2+ (A) NEG       Radiologic Studies -   XR CHEST PA LAT   Final Result   IMPRESSION:      Atherosclerosis            Medical Decision Making     It should be noted that Malgorzata DOTSON PA-C will be the provider of record for this patient. I reviewed the vital signs, available nursing notes, past medical history, past surgical history, family history and social history. Vital Signs-Reviewed the patient's vital signs. Pulse Oximetry Analysis -  98% on room air (Interpretation)    Cardiac Monitor:  Rate: 83    Records Reviewed: Nursing Notes and Triage notes  (Time of Review: 12:28 PM)    ED Course: Progress Notes, Reevaluation, and Consults:    Provider Notes (Medical Decision Making): DDx: gastroenteritis, GERD, hernia, hepatitis, pancreatitis, gallbladder etiology, constipation, adhesions, UTI, pyelo, kidney stones    Bladder scan with max of 106ml present in bladder. Nursing straight catheterized pt and got decent urine return.      D/w ED attending Dr. Neville Wright, suspects pt may be dehydrated d/t recent N/V/D, recommends check labs, give IVF, reassess. 1:42 PM  I discussed the case with Dr. Lizbet Yip. Labs are concerning for LOLIS. Recommends consulting with Nephrology and admitting. 1:44 PM  I dicussed the patient with Dr. Samy Webber, Attending at [facility]. Recommends to admit the patient. 1:51 PM  I spoke with Dr. Manuel Hernandez, Nephrology. Recommends to continue giving the patient IV fluids, and they will consult on the patient. 1:54 PM  I spoke with the [facility] Resident, who said to admit the patient to medical, and she would like us to place a kendrick. Diagnosis     Clinical Impression:   1. Acute renal failure, unspecified acute renal failure type ([address]    2. Urinary retention    3. Dehydration    4. Type 2 diabetes mellitus with complication, without long-term current use of insulin (HCC)        Disposition: Admission     Follow-up Information    None             Medication List      ASK your doctor about these medications    alendronate 70 mg tablet  Commonly known as:  FOSAMAX  TAKE 1 TABLET EVERY 7 DAYS     amLODIPine 5 mg tablet  Commonly known as:  NORVASC  TAKE 1 TABLET DAILY     aspirin 81 mg chewable tablet     cholecalciferol 1,000 unit Cap  Commonly known as:  VITAMIN D3     CRANBERRY PLUS VITAMIN C PO     diclofenac 1 % Gel  Commonly known as:  VOLTAREN  Apply  to affected area four (4) times daily. ferrous sulfate 325 mg (65 mg iron) tablet  Commonly known as:  Iron (Ferrous Sulfate)  Take 1 Tab by mouth two (2) times a day. furosemide 40 mg tablet  Commonly known as:  LASIX  Take 1 Tab by mouth daily. Takes 40 mg every morning     gabapentin 300 mg capsule  Commonly known as:  NEURONTIN  TAKE 1 CAPSULE NIGHTLY     lancets Misc  Precision X-tra.  Check 1-2 times a day     levothyroxine 112 mcg tablet  Commonly known as:  SYNTHROID     magnesium 250 mg Tab     metFORMIN 1,000 mg tablet  Commonly known as: GLUCOPHAGE  TAKE 1 TABLET TWICE A DAY WITH MEALS     metOLazone 10 mg tablet  Commonly known as:  ZAROXOLYN     omega-3 fatty acids-vitamin e 1,000 mg Cap     pantoprazole 20 mg tablet  Commonly known as:  PROTONIX  Take 1 Tab by mouth daily. pramipexole 0.5 mg tablet  Commonly known as:  MIRAPEX  TAKE 1 TABLET THREE TIMES A DAY     PRECISION XTRA TEST strip  Generic drug:  glucose blood VI test strips  USE TO CHECK 1 TO 2 TIMES DAILY     raNITIdine 150 mg tablet  Commonly known as:  ZANTAC  Take 1 Tab by mouth two (2) times a day. simvastatin 20 mg tablet  Commonly known as:  ZOCOR  TAKE 1 TABLET NIGHTLY     tiZANidine 4 mg capsule  Commonly known as:  ZANAFLEX     valsartan 160 mg tablet  Commonly known as:  DIOVAN  TAKE 1 TABLET DAILY          _______________________________       Guero Willingham acting as a scribe for and in the presence of  Boo Garcia PA-C      February 08, 2019 at 12:28 PM       Provider Attestation:      I personally performed the services described in the documentation, reviewed the documentation, as recorded by the scribe in my presence, and it accurately and completely records my words and actions.  February 08, 2019 at 12:28 PM -  Boo Garcia PA-C        _______________________________

## 2019-02-08 NOTE — H&P
Admission History and Physical  [facility]      Patient: Frankey Dame MRN: [id number]  [facility]: [id number]    YOB: 1940  Age: [de-identified] y.o. Sex: female      DOA: 2/8/2019       HPI:     Frankey Dame is a [de-identified] y.o. female with PMH of HTN, HLD, DM type 2, spinal stenosis, restless leg syndrome, and uterine cancer, now presenting with complaint of inability to urinate. She began feeling sick with the flu approx one week ago. She had 2-3 days of nausea w/ vomiting and diarrhea with subjective fevers at home. Then, she began have very minimal urine output that began two days ago. She has only been able to pass a trickle of urine. No dysuria or hematuria. She is having suprapubic pain, but the nausea and vomiting with diarrhea has stopped. She does have baseline back pain with numbness in the dorsal surfaces of both feet 2/2 to diabetic neuropathy. In the ER, she was afebrile and hemodynamically stable. Her CBC was WNL, BMP was significant for BUN of 57 and Cr of 4.39. A urinalysis showed trace ketones, moderate leuk esterase, 2+ bacteria. A retroperitoneal ultrasound was ordered. Review of Systems - General ROS: Positive for fevers, negative for  - chills  Ophthalmic ROS: negative for - blurry vision, decreased vision or loss of vision  ENT ROS: negative for - headaches, hearing change or visual changes  Hematological and Lymphatic ROS: negative for - bruising, jaundice  Respiratory ROS: negative for - cough, hemoptysis, shortness of breath, orthopnea, paroxysmal dyspnea, or wheezing  Cardiovascular ROS: negative for - chest pain, dyspnea on exertion, edema, or palpitations   Gastrointestinal ROS: Positive for - abdominal pain, nausea, vomiting, and diarrhea.   Negative for- blood in stools, change in stools, constipation,hematemesis, melena, or swallowing difficulty/pain  Genito-Urinary ROS: negative for - dysuria, hematuria or urinary frequency/urgency  Musculoskeletal ROS: Positive for - back pain.  Negative for - joint pain, joint swelling or muscle pain  Neurological ROS: negative for - dizziness, headaches, Positive for- numbness/tingling of the bilateral feet  Dermatological ROS: negative for - rash or skin lesion changes      Past Medical History:   Diagnosis Date    Acid reflux     Cancer ([facility] [address]     uterine    Diabetes ([facility] [facility]     Dizzy spells     Essential hypertension     Gallbladder attack     Hearing loss     High cholesterol     Hypertension     Memory loss     Sinus complaint     SOB (shortness of breath)     Thyroid disease        Past Surgical History:   Procedure Laterality Date    HX APPENDECTOMY      HX CATARACT REMOVAL      HX CHOLECYSTECTOMY      HX HYSTERECTOMY      partial hysterectomy    HX ORTHOPAEDIC      5 back surgeries    HX ORTHOPAEDIC      hand arm and left knee    NEUROLOGICAL PROCEDURE UNLISTED  1993    Compression Fracture Surgery    NEUROLOGICAL PROCEDURE UNLISTED  1994    NEUROLOGICAL PROCEDURE UNLISTED  1995       Family History   Problem Relation Age of Onset    Cancer Mother 76        Cervical Cancer    Asthma Father     Heart Attack Father     Cancer Maternal Grandmother         colon cancer    Diabetes Maternal Grandmother     Hypertension Maternal Grandmother     Breast Cancer Daughter         two daughters    Diabetes Paternal Grandmother     Heart Disease Paternal Grandmother     Other Maternal Aunt         mental illness due to accident during infancy       Social History     Socioeconomic History    Marital status:      Spouse name: Not on file    Number of children: Not on file    Years of education: Not on file    Highest education level: Not on file   Tobacco Use    Smoking status: Never Smoker    Smokeless tobacco: Never Used   Substance and Sexual Activity    Alcohol use: No     Alcohol/week: 0.0 oz    Drug use: No    Sexual activity: No   Social History Narrative    ** Merged History Encounter **            Allergies   Allergen Reactions    Codeine Swelling    Codeine Other (comments)     Severe swelling    Feldene [Piroxicam] Swelling     Severe swelling of hands, face and feet. Prior to Admission Medications   Prescriptions Last Dose Informant Patient Reported? Taking? CRANBERRY CONC/ASCORBIC ACID (CRANBERRY PLUS VITAMIN C PO)   Yes No   Sig: Take 4,000 mg by mouth daily. Lancets misc   No No   Sig: Precision X-tra. Check 1-2 times a day   PRECISION XTRA TEST strip   No No   Sig: USE TO CHECK 1 TO 2 TIMES DAILY   alendronate (FOSAMAX) 70 mg tablet   No No   Sig: TAKE 1 TABLET EVERY 7 DAYS   amLODIPine (NORVASC) 5 mg tablet   No No   Sig: TAKE 1 TABLET DAILY   aspirin 81 mg chewable tablet   Yes No   Sig: Take 81 mg by mouth daily. cholecalciferol (VITAMIN D3) 1,000 unit cap   Yes No   Sig: Take 1,000 Units by mouth daily. diclofenac (VOLTAREN) 1 % gel   No No   Sig: Apply  to affected area four (4) times daily. ferrous sulfate (IRON, FERROUS SULFATE,) 325 mg (65 mg iron) tablet   No No   Sig: Take 1 Tab by mouth two (2) times a day. Patient taking differently: Take 325 mg by mouth daily. furosemide (LASIX) 40 mg tablet   No No   Sig: Take 1 Tab by mouth daily. Takes 40 mg every morning   Patient taking differently: Take 40 mg by mouth daily as needed. Takes 40 mg every morning   gabapentin (NEURONTIN) 300 mg capsule   No No   Sig: TAKE 1 CAPSULE NIGHTLY   levothyroxine (SYNTHROID) 112 mcg tablet   Yes No   Sig: Take 135 mcg by mouth Daily (before breakfast). magnesium 250 mg tab   Yes No   Sig: Take 250 mg by mouth daily. metFORMIN (GLUCOPHAGE) 1,000 mg tablet   No No   Sig: TAKE 1 TABLET TWICE A DAY WITH MEALS   metOLazone (ZAROXOLYN) 10 mg tablet   Yes No   Sig: Take 5 mg by mouth daily. omega-3 fatty acids-vitamin e 1,000 mg cap   Yes No   Sig: Take 1 Cap by mouth two (2) times a day.  1,200 mg 2 times daily   pantoprazole (PROTONIX) 20 mg tablet   No No   Sig: Take 1 Tab by mouth daily. pramipexole (MIRAPEX) 0.5 mg tablet   No No   Sig: TAKE 1 TABLET THREE TIMES A DAY   raNITIdine (ZANTAC) 150 mg tablet   No No   Sig: Take 1 Tab by mouth two (2) times a day. simvastatin (ZOCOR) 20 mg tablet   No No   Sig: TAKE 1 TABLET NIGHTLY   tiZANidine (ZANAFLEX) 4 mg capsule   Yes No   Sig: Take 4 mg by mouth nightly. valsartan (DIOVAN) 160 mg tablet   No No   Sig: TAKE 1 TABLET DAILY      Facility-Administered Medications: None       Physical Exam:     Patient Vitals for the past 24 hrs:   Temp Pulse Resp BP SpO2   02/08/19 1118 97.9 °F (36.6 °C) 83 16 125/55 98 %       Physical Exam:   General:  Alert and Responsive and in No acute distress. HEENT: Conjunctiva pink, sclera anicteric. EOMI. Pharynx moist, nonerythematous. Moist mucous membranes. No cervical, supraclavicular, occipital or submandibular lymphadenopathy. No other gross abnormalities present. CV:  RRR, no murmurs. No visible pulsations or thrills. RESP:  Unlabored breathing. Lungs clear to auscultation. no wheeze, rales, or rhonchi. Equal expansion bilaterally. ABD:  +suprapubic tenderness, sft, nondistended. No hepatosplenomegaly. No CVA tenderness. MS:  No ttp to the spinous processes  Neuro:  5/5 strength bilateral upper extremities and lower extremities. A+Ox3. Ext:  No edema. 2+ radial and dp pulses bilaterally. Skin:  No rashes, lesions, or ulcers. Good turgor. IMAGING:   Xr Chest Pa Lat    Result Date: 2/8/2019  IMPRESSION: Atherosclerosis    Us Retroperitoneum Comp    Result Date: 2/8/2019  IMPRESSION: Mild hydronephrosis on the right. No definite nephrolithiasis visualized. Echogenic kidneys may be seen with chronic medical renal disease. No definite left renal lesions visualized on today's ultrasound as above.        Recent Results (from the past 12 hour(s))   URINALYSIS W/ RFLX MICROSCOPIC    Collection Time: 02/08/19 12:40 PM   Result Value Ref Range    Color YELLOW Appearance CLOUDY      Specific gravity 1.017 1.005 - 1.030      pH (UA) 5.0 5.0 - 8.0      Protein NEGATIVE  NEG mg/dL    Glucose NEGATIVE  NEG mg/dL    Ketone TRACE (A) NEG mg/dL    Bilirubin NEGATIVE  NEG      Blood NEGATIVE  NEG      Urobilinogen 0.2 0.2 - 1.0 EU/dL    Nitrites NEGATIVE  NEG      Leukocyte Esterase MODERATE (A) NEG     CBC WITH AUTOMATED DIFF    Collection Time: 02/08/19 12:40 PM   Result Value Ref Range    WBC 8.9 4.6 - 13.2 K/uL    RBC 3.86 (L) 4.20 - 5.30 M/uL    HGB 10.9 (L) 12.0 - 16.0 g/dL    HCT 32.2 (L) 35.0 - 45.0 %    MCV 83.4 74.0 - 97.0 FL    MCH 28.2 24.0 - 34.0 PG    MCHC 33.9 31.0 - 37.0 g/dL    RDW 14.4 11.6 - 14.5 %    PLATELET 831 191 - 273 K/uL    MPV 9.5 9.2 - 11.8 FL    NEUTROPHILS 67 40 - 73 %    LYMPHOCYTES 19 (L) 21 - 52 %    MONOCYTES 9 3 - 10 %    EOSINOPHILS 5 0 - 5 %    BASOPHILS 0 0 - 2 %    ABS. NEUTROPHILS 5.9 1.8 - 8.0 K/UL    ABS. LYMPHOCYTES 1.7 0.9 - 3.6 K/UL    ABS. MONOCYTES 0.8 0.05 - 1.2 K/UL    ABS. EOSINOPHILS 0.5 (H) 0.0 - 0.4 K/UL    ABS. BASOPHILS 0.0 0.0 - 0.1 K/UL    DF AUTOMATED     METABOLIC PANEL, COMPREHENSIVE    Collection Time: 02/08/19 12:40 PM   Result Value Ref Range    Sodium 137 136 - 145 mmol/L    Potassium 3.9 3.5 - 5.5 mmol/L    Chloride 103 100 - 108 mmol/L    CO2 22 21 - 32 mmol/L    Anion gap 12 3.0 - 18 mmol/L    Glucose 152 (H) 74 - 99 mg/dL    BUN 57 (H) 7.0 - 18 MG/DL    Creatinine 4.39 (H) 0.6 - 1.3 MG/DL    BUN/Creatinine ratio 13 12 - 20      GFR est AA 12 (L) >60 ml/min/1.73m2    GFR est non-AA 10 (L) >60 ml/min/1.73m2    Calcium 9.0 8.5 - 10.1 MG/DL    Bilirubin, total 0.4 0.2 - 1.0 MG/DL    ALT (SGPT) 25 13 - 56 U/L    AST (SGOT) 21 15 - 37 U/L    Alk.  phosphatase 68 45 - 117 U/L    Protein, total 7.5 6.4 - 8.2 g/dL    Albumin 4.1 3.4 - 5.0 g/dL    Globulin 3.4 2.0 - 4.0 g/dL    A-G Ratio 1.2 0.8 - 1.7     LIPASE    Collection Time: 02/08/19 12:40 PM   Result Value Ref Range    Lipase 212 73 - 393 U/L   URINE MICROSCOPIC ONLY    Collection Time: 02/08/19 12:40 PM   Result Value Ref Range    WBC 45 to 50 0 - 4 /hpf    RBC NEGATIVE  0 - 5 /hpf    Epithelial cells 1+ 0 - 5 /lpf    Bacteria 2+ (A) NEG /hpf    Amorphous Crystals 2+ (A) NEG         Assessment/Plan:   [de-identified] y.o. female with PMH of uterine cancer, HTN, HLD, spinal stenosis, and DM type 2, now admitted with oliguria and LOLIS. Oliguria w/ LOLIS No history of kidney disease, patient's baseline is 1-1.1. Her Cr was 1.7 w/ PCM last week, now at 4.39 on presentation to the ER with very little urine output. Nephrology was consulted. She has approx 3 days of nauesa/vomiting/diarrhea preceding urinary retension, likely significantly contributing to her LOLIS. UA is significant for moderate leuk esterase and 2+ bacteria. - FU Nephrology recs  - Urine culture pending  - FU urine Cr and urine Na  - Bladder checks as needed  - FU Retroperitoneal ultrasound  - NS 150cc/hr for fluid resuscitation   - Place Mo[place]ica  - Strict Is & Os  - Avoid nephrotoxic medications  - Hold home Januvia, metformin, furosemide, and valsarten  - daily BMP/CBC  - Order influenza    Back Pain w/ Spinal Stenosis, osteoporosis, and frequent falls at home:  Urinary retention does not seem to be associated with a history of spinal stenosis. She does not have neurological deficits w/ the exception of decreased sensation of the dorsal services of her feet, which is baseline for her. She is able to move all extremities and her back pain is at baseline. She has a procedure scheduled in 4 days for back pain. MRI on 1/19 had moderate lumbar spondylosis w/ degenerative changes most pronounced at L3/L4.   - hold home alendronate 70mg weekly  - Fall Precautions  - Tylenol prn for pain    Diabetes Mellitus Type 2 w/ neuropathy Will hold home Januvia and metformin due to kidney injury. Will also hold gabapentin.   - SSI    HTN/HLD Will hold home furosemide and valsartan.   - Continue home amlodipine 5mg daily  - Continue home simvastatin 20mg daily    Restless-leg Syndrome w/ muscle spasms  - home pramipexole 0.5mg tid held  - home tizanidine 4mg daily held    Hypothyroidism Levothyroxine 125mcg and levothyroxine 137mcg alternated every other day at home. - levothyroxine 125mcg daily     Diet: Renal  DVT Prophylaxis: SQH  Code Status: DNR  Point of Contact: Kermit Pollard (Relationship: daughter) [phone]    Disposition and anticipated LOS: [address] [facility]  PGY-1  02/08/19 2:31 PM        Senior Addendum to History and Physical  [facility]      Patient: Sky Beach MRN: [id number]  CSN: [id number]    YOB: 1940  Age: [de-identified] y.o. Sex: female      DOA: 2/8/2019       HPI:     Sky Beach is a [de-identified] y.o. female with PMH HTN, DM2, hypothyroidism, osteoporosis, spinal stenosis, now presenting with complaint of urinary retention. Pt has had difficulty urinating over past 2 days without dysuria or hematuria. Pt with recent h/o flu but reports symptoms resolved. Pt also has h/o spinal stenosis but no associated urinary symptoms. Pt developed flu like symptoms on 1/30, primarily nausea and vomiting. Pt reports attempting to drink more fluids but being unable to keep anything down. This continued for about a week and stopped 2 days ago, when she became unable to urinate. ED Course: CBC: Hgb: 10.9  UA: trace ketone, mod LE, 45-50 WBC, 2+ bacteria, 2+ amorphous crystals  CMP: BUN: 57, Cr: 4.39  CXR: Atherosclerosis  MRI L Spine (1/19): No significant interval change within moderate lumbar spondylosis. Degenerative changes are again most pronounced at L3/L4 where there is moderate narrowing of the bilateral lateral recesses and moderate bilateral foraminal stenosis. Please see above for more detail additional level by level description. L3 vertebral augmentation.  Interval decrease L3 vertebral body and left L3 pedicle stir hyperintensity compared to prior MRI this may reflect an underlying lesion or chronic inflammatory change. Physical Exam:     Physical Exam:  General:  Alert and Responsive and in No acute distress. HEENT: Conjunctiva pink, sclera anicteric. PERRL. EOMI. Pharynx moist, nonerythematous. Moist mucous membranes. Thyroid not enlarged, no nodules. No cervical, supraclavicular, occipital or submandibular lymphadenopathy. No other gross abnormalities present. CV:  RRR, no murmurs. PMI not displaced. No visible pulsations or thrills. No carotid bruits. RESP:  Unlabored breathing. Lungs clear to auscultation. no wheeze, rales, or rhonchi. Equal expansion bilaterally. ABD:  Soft, nondistended. Normoactive bowel sounds. No hepatosplenomegaly. Suprapubic tenderness noted  RECTAL:  See intern note. MS:  No joint deformity or instability. No atrophy. Neuro:  5/5 strength bilateral upper extremities and lower extremities. CN II-XII intact. Sensation grossly intact. 2+ patellar reflexes bilaterally. A+Ox3. Ext:  No edema. 2+ radial and dp pulses bilaterally. Skin:  Toenails missing from L foot, hypopigmentation on dorsal feet bilaterally and dorsal hands bilaterally      Assessment/Plan:   [de-identified] y.o. female with PMH  HTN, DM2, hypothyroidism, osteoporosis, spinal stenosis, now admitted with LOLIS with urinary retention. LOLIS/ oliguria: Pt with no h/o kidney disease. Pt has h/o L spine stenosis but urinary symptoms have not been chronic. UA with mod LE, 45-50 WBC, 2+ bacteria. Pt's baseline Cr 1.0-1.1. 4.39 in ED. Received 1L NS bolus in ED.   - Admit to medicine  - IVF: NS @ 150mL/hr  - FU UCx  - FU retroperitoneal US  - hold home furosemide and valsartan  - avoid nephrotoxic meds  - renally dose meds  - Nephro consulted- appreciate recs  - Strict I&O via [place]  - Daily CBC, BMP  - PT/OT/CM  - Fall precautions      For additional problem list, assessment, and plan see intern note.     Pat Tafoya MD, PGY-2  [facility] Medicine  02/08/19 1:51 PM

## 2019-02-08 NOTE — DISCHARGE SUMMARY
[address] Medicine  Discharge Summary      Patient: Jennifer Dia MRN: [id number]  CSN: [id number]    YOB: 1940  Age: [de-identified] y.o. Sex: female      Admission Date: 2/8/2019 Discharge Date: 02/11/2019   Attending: Jennifer Hernandez MD PCP: Alfonza Fothergill, MD     ================================================================    Reason for Admission: LOLIS (acute kidney injury) [facility]) [N17.9]  Urinary obstruction [N13.9]    Discharge Diagnoses:   Oliguria and LOLIS, resolved  Chronic back pain, spinal stenosis, osteoporosis  H/O diabetes mellitus type 2 with neuropathy  H/O HTN  H/O Hypertension  H/O RLS  H/O Hypothyroidism    Important notes to PCP/ follow-up studies and evaluations   - Patient reports taking valsartan 160mg daily AND Diovan 160mg daily--> likely contributed to her LOLIS, does not need nephrology outpatient followup  - New complaints of urinary incontinence w/o hospital attributable cause  - Discharged to SNF as no support at home and she has stairs at home, deconditioned  - Continue levofloxacin 750 mg q48h starting 02/12/2019    Pending labs and studies:  None    Operative Procedures:   None    Discharge Medications:     Current Discharge Medication List      START taking these medications    Details   levoFLOXacin (LEVAQUIN) 750 mg tablet Take 1 Tab by mouth every fourty-eight (48) hours. Qty: 3 Tab, Refills: 0         CONTINUE these medications which have NOT CHANGED    Details   SITagliptin (JANUVIA) 50 mg tablet Take 50 mg by mouth two (2) times a day.       valsartan (DIOVAN) 160 mg tablet TAKE 1 TABLET DAILY  Qty: 90 Tab, Refills: 0    Associated Diagnoses: Essential hypertension with goal blood pressure less than 130/80      simvastatin (ZOCOR) 20 mg tablet TAKE 1 TABLET NIGHTLY  Qty: 90 Tab, Refills: 0      amLODIPine (NORVASC) 5 mg tablet TAKE 1 TABLET DAILY  Qty: 90 Tab, Refills: 1    Associated Diagnoses: Essential hypertension with goal blood pressure less than 130/80      metFORMIN (GLUCOPHAGE) 1,000 mg tablet TAKE 1 TABLET TWICE A DAY WITH MEALS  Qty: 180 Tab, Refills: 0    Associated Diagnoses: Diabetes mellitus type 2, controlled (HCC)      alendronate (FOSAMAX) 70 mg tablet TAKE 1 TABLET EVERY 7 DAYS  Qty: 12 Tab, Refills: 0    Associated Diagnoses: Facet syndrome ([address] Myofascial pain; H/O osteopenia      gabapentin (NEURONTIN) 300 mg capsule TAKE 1 CAPSULE NIGHTLY  Qty: 30 Cap, Refills: 0      raNITIdine (ZANTAC) 150 mg tablet Take 1 Tab by mouth two (2) times a day. Qty: 180 Tab, Refills: 1    Associated Diagnoses: Gastroesophageal reflux disease without esophagitis      pramipexole (MIRAPEX) 0.5 mg tablet TAKE 1 TABLET THREE TIMES A DAY  Qty: 270 Tab, Refills: 0      furosemide (LASIX) 40 mg tablet Take 1 Tab by mouth daily. Takes 40 mg every morning  Qty: 30 Tab, Refills: 0    Associated Diagnoses: Leg swelling      tiZANidine (ZANAFLEX) 4 mg capsule Take 4 mg by mouth nightly. levothyroxine (SYNTHROID) 137 mcg tablet Take 137 mcg by mouth Daily (before breakfast). Take one tablet every other day, alternating with levothyroxine 125mcg      predniSONE (DELTASONE) 20 mg tablet Take 20 mg by mouth daily as needed for Other (for gout flares). PRECISION XTRA TEST strip USE TO CHECK 1 TO 2 TIMES DAILY  Qty: 100 Strip, Refills: 6    Associated Diagnoses: Diabetes mellitus type 2, controlled ([facility] [address]      Lancets misc Precision X-tra. Check 1-2 times a day  Qty: 100 Each, Refills: 6    Associated Diagnoses: Diabetes mellitus type 2, controlled ([address]           Disposition: [facility]    Consultants:    Nephrology, Dr. Sravanthi Burton (including pertinent history and physical findings)  Patient came in to the ED after 1 week vomiting/diarrhea and stated she had not urinated in the past two days. No initial evidence of obstruction or dysuria.  Patient reported starting Bim approximately 2 weeks prior to admission and had previously been admitted for LOLIS. Initial labs confirmed a oliguric LOLIS / prerenal azotemia likely 2/2 ATN due to dehydration and concomitant usage of ?excess diovan and ARB. Nephrology consulted and she was started on IVFs for rehydration. Nephrotoxic medications were held. A retroperitoneal ultrasound was obtained that did demonstrate mild hydronephrosis on the R but this was likely unrelated to the present hospital encounter. FeNA was 0.49. Kidney function significantly improved with IVFs. Patient had new complaints of reduced sensation with urination and urgency. She also stated that she was unable to ambulate at home with her walker and unable to climb stairs required at home. She was evaluated by PT who recommended SNF placement with physical therapy. Summarized key findings and results (labs, imaging studies, ECHO, cardiac cath, endoscopies, etc):    Key Findings or tests:   DATE TEST RESULT OR IMPRESSION   02/08/2019 CXR PA/Lat IMPRESSION:  Atherosclerosis   02/08/2019 Urine Cx 80,000 CFU Escherichia coli   02/08/2019 US Retroperitoneum IMPRESSION:  Mild hydronephrosis on the right. No definite nephrolithiasis visualized. Echogenic kidneys may be seen with chronic medical renal disease. No definite left renal lesions visualized on today's ultrasound as above.        Chemistry Recent Labs     02/11/19  0221 02/10/19  0221 02/09/19  0320   * 153* 156*    139 138   K 3.9 3.9 3.7   * 108 107   CO2 24 25 23   BUN 16 24* 44*   CREA 1.08 1.24 2.47*   CA 8.8 8.4* 8.7   AGAP 8 6 8   BUCR 15 19 18        CBC w/Diff Recent Labs     02/11/19  0221 02/10/19  0221 02/09/19  0320   WBC 5.9 6.5 6.5   RBC 3.55* 3.39* 3.40*   HGB 10.1* 9.7* 9.4*   HCT 29.7* 28.4* 28.7*    197 196   GRANS 58 62 65   LYMPH 25 23 19*   EOS 6* 4 7*        Liver Enzymes Protein, total   Date Value Ref Range Status   02/08/2019 7.5 6.4 - 8.2 g/dL Final     Albumin   Date Value Ref Range Status   02/08/2019 4.1 3.4 - 5.0 g/dL Final     Globulin   Date Value Ref Range Status   02/08/2019 3.4 2.0 - 4.0 g/dL Final     A-G Ratio   Date Value Ref Range Status   02/08/2019 1.2 0.8 - 1.7   Final     AST (SGOT)   Date Value Ref Range Status   02/08/2019 21 15 - 37 U/L Final     Alk. phosphatase   Date Value Ref Range Status   02/08/2019 68 45 - 117 U/L Final     No results for input(s): TP, ALB, GLOB, AGRAT, SGOT, GPT, AP, TBIL in the last 72 hours.     No lab exists for component: DBIL     Urinalysis Lab Results   Component Value Date/Time    Color YELLOW 02/08/2019 12:40 PM    Appearance CLOUDY 02/08/2019 12:40 PM    Specific gravity 1.017 02/08/2019 12:40 PM    pH (UA) 5.0 02/08/2019 12:40 PM    Protein NEGATIVE  02/08/2019 12:40 PM    Glucose NEGATIVE  02/08/2019 12:40 PM    Ketone TRACE (A) 02/08/2019 12:40 PM    Bilirubin NEGATIVE  02/08/2019 12:40 PM    Urobilinogen 0.2 02/08/2019 12:40 PM    Nitrites NEGATIVE  02/08/2019 12:40 PM    Leukocyte Esterase MODERATE (A) 02/08/2019 12:40 PM    Epithelial cells 1+ 02/08/2019 12:40 PM    Bacteria 2+ (A) 02/08/2019 12:40 PM    WBC 45 to 50 02/08/2019 12:40 PM    RBC NEGATIVE  02/08/2019 12:40 PM        Micro Urine Cx  Specimen Information: Clean catch; Urine        Component Value Flag Ref Range Units Status   Special Requests:      Final   NO SPECIAL REQUESTS    Culture result: (Abnormal)      Final   612[address] COLONIES/mL ESCHERICHIA COLI Abnormal         Functional status and cognitive function:    Ambulates with walker  Status: alert, cooperative, no distress, appears stated age  Condition: STABLE  Disposition: SNF    Diet: Renal    Code status and advanced care plan: DNR  [address] of Contact: Cecilia Contreras [phone]    Patient Education:  Patient was educated on the following topics prior to discharge: Dehydration    Follow-up:   Follow-up Information     Follow up With Specialties Details Why Contact Info    Dylon Douglas MD Family Practice In 2 days For hospital followup with family care physician  [address]  [phone]          ================================================================    Zhanna Chacko DO, PGY-1   [facility] Medicine   Intern Pager: [phone]   February 11, 2019, 12:23 PM

## 2019-02-09 LAB
ANION GAP SERPL CALC-SCNC: 8 MMOL/L (ref 3–18)
BASOPHILS # BLD: 0 K/UL (ref 0–0.1)
BASOPHILS NFR BLD: 0 % (ref 0–2)
BUN SERPL-MCNC: 44 MG/DL (ref 7–18)
BUN/CREAT SERPL: 18 (ref 12–20)
CALCIUM SERPL-MCNC: 8.7 MG/DL (ref 8.5–10.1)
CHLORIDE SERPL-SCNC: 107 MMOL/L (ref 100–108)
CO2 SERPL-SCNC: 23 MMOL/L (ref 21–32)
CREAT SERPL-MCNC: 2.47 MG/DL (ref 0.6–1.3)
DIFFERENTIAL METHOD BLD: ABNORMAL
EOSINOPHIL # BLD: 0.4 K/UL (ref 0–0.4)
EOSINOPHIL NFR BLD: 7 % (ref 0–5)
ERYTHROCYTE [DISTWIDTH] IN BLOOD BY AUTOMATED COUNT: 14.3 % (ref 11.6–14.5)
GLUCOSE BLD STRIP.AUTO-MCNC: 151 MG/DL (ref 70–110)
GLUCOSE BLD STRIP.AUTO-MCNC: 158 MG/DL (ref 70–110)
GLUCOSE BLD STRIP.AUTO-MCNC: 164 MG/DL (ref 70–110)
GLUCOSE BLD STRIP.AUTO-MCNC: 180 MG/DL (ref 70–110)
GLUCOSE SERPL-MCNC: 156 MG/DL (ref 74–99)
HCT VFR BLD AUTO: 28.7 % (ref 35–45)
HGB BLD-MCNC: 9.4 G/DL (ref 12–16)
LYMPHOCYTES # BLD: 1.2 K/UL (ref 0.9–3.6)
LYMPHOCYTES NFR BLD: 19 % (ref 21–52)
MCH RBC QN AUTO: 27.6 PG (ref 24–34)
MCHC RBC AUTO-ENTMCNC: 32.8 G/DL (ref 31–37)
MCV RBC AUTO: 84.4 FL (ref 74–97)
MONOCYTES # BLD: 0.6 K/UL (ref 0.05–1.2)
MONOCYTES NFR BLD: 9 % (ref 3–10)
NEUTS SEG # BLD: 4.3 K/UL (ref 1.8–8)
NEUTS SEG NFR BLD: 65 % (ref 40–73)
PLATELET # BLD AUTO: 196 K/UL (ref 135–420)
PMV BLD AUTO: 9.7 FL (ref 9.2–11.8)
POTASSIUM SERPL-SCNC: 3.7 MMOL/L (ref 3.5–5.5)
RBC # BLD AUTO: 3.4 M/UL (ref 4.2–5.3)
SODIUM SERPL-SCNC: 138 MMOL/L (ref 136–145)
WBC # BLD AUTO: 6.5 K/UL (ref 4.6–13.2)

## 2019-02-09 PROCEDURE — 82962 GLUCOSE BLOOD TEST: CPT

## 2019-02-09 PROCEDURE — 65270000029 HC RM PRIVATE

## 2019-02-09 PROCEDURE — 74011250637 HC RX REV CODE- 250/637: Performed by: FAMILY MEDICINE

## 2019-02-09 PROCEDURE — 74011250636 HC RX REV CODE- 250/636: Performed by: PHYSICIAN ASSISTANT

## 2019-02-09 PROCEDURE — 80048 BASIC METABOLIC PNL TOTAL CA: CPT

## 2019-02-09 PROCEDURE — 74011250636 HC RX REV CODE- 250/636: Performed by: FAMILY MEDICINE

## 2019-02-09 PROCEDURE — 74011636637 HC RX REV CODE- 636/637: Performed by: FAMILY MEDICINE

## 2019-02-09 PROCEDURE — 85025 COMPLETE CBC W/AUTO DIFF WBC: CPT

## 2019-02-09 PROCEDURE — 36415 COLL VENOUS BLD VENIPUNCTURE: CPT

## 2019-02-09 RX ADMIN — AMLODIPINE BESYLATE 5 MG: 5 TABLET ORAL at 09:24

## 2019-02-09 RX ADMIN — HEPARIN SODIUM 5000 UNITS: 5000 INJECTION INTRAVENOUS; SUBCUTANEOUS at 17:53

## 2019-02-09 RX ADMIN — FAMOTIDINE 20 MG: 20 TABLET ORAL at 09:25

## 2019-02-09 RX ADMIN — SIMVASTATIN 20 MG: 20 TABLET, FILM COATED ORAL at 01:09

## 2019-02-09 RX ADMIN — HEPARIN SODIUM 5000 UNITS: 5000 INJECTION INTRAVENOUS; SUBCUTANEOUS at 09:28

## 2019-02-09 RX ADMIN — INSULIN LISPRO 2 UNITS: 100 INJECTION, SOLUTION INTRAVENOUS; SUBCUTANEOUS at 09:25

## 2019-02-09 RX ADMIN — INSULIN LISPRO 2 UNITS: 100 INJECTION, SOLUTION INTRAVENOUS; SUBCUTANEOUS at 12:11

## 2019-02-09 RX ADMIN — SODIUM CHLORIDE 100 ML/HR: 900 INJECTION, SOLUTION INTRAVENOUS at 10:41

## 2019-02-09 RX ADMIN — SODIUM CHLORIDE 100 ML/HR: 900 INJECTION, SOLUTION INTRAVENOUS at 22:00

## 2019-02-09 RX ADMIN — LEVOTHYROXINE SODIUM 125 MCG: 125 TABLET ORAL at 06:59

## 2019-02-09 RX ADMIN — SODIUM CHLORIDE 100 ML/HR: 900 INJECTION, SOLUTION INTRAVENOUS at 01:08

## 2019-02-09 RX ADMIN — HEPARIN SODIUM 5000 UNITS: 5000 INJECTION INTRAVENOUS; SUBCUTANEOUS at 01:10

## 2019-02-09 RX ADMIN — INSULIN LISPRO 2 UNITS: 100 INJECTION, SOLUTION INTRAVENOUS; SUBCUTANEOUS at 17:51

## 2019-02-09 NOTE — PROGRESS NOTES
Intern Progress Note  [facility]       Patient: Rosalie Polo MRN: [id number]  CSN: [id number]    YOB: 1940  Age: [de-identified] y.o. Sex: female    DOA: 2/8/2019 LOS:  LOS: 0 days                    Subjective:     No acute events overnight. Patient reports increased chest tightness. Breathing improved however. States she feels \"beat up\" and hurts all over. Review of Systems   Constitutional: Negative for chills and fever. Respiratory: Positive for shortness of breath and wheezing. Negative for cough. Cardiovascular: Negative for chest pain. Gastrointestinal: Negative for abdominal pain. Genitourinary: Negative for dysuria and hematuria. Musculoskeletal: Positive for back pain and myalgias. Objective:      Patient Vitals for the past 24 hrs:   Temp Pulse Resp BP SpO2   02/08/19 1949 97 °F (36.1 °C) 64 16 125/61 98 %   02/08/19 1858 97.2 °F (36.2 °C) 70 16 118/64 98 %   02/08/19 1118 97.9 °F (36.6 °C) 83 16 125/55 98 %       No intake or output data in the 24 hours ending 02/08/19 2058    Physical Exam:   General:  Alert and Responsive and in No acute distress. HEENT: Conjunctiva pink, sclera anicteric. Pharynx moist, nonerythematous. Moist mucous membranes. CV:  RRR, no murmurs. No visible pulsations or thrills. RESP:  Unlabored breathing. Lungs clear to auscultation. no wheeze, rales, or rhonchi. Equal expansion bilaterally. ABD:  Soft, nontender, nondistended. MS:  No joint deformity or instability. Neuro:  5/5 strength bilateral upper extremities and lower extremities. A+Ox3. Ext:  No edema. 2+ radial and dp pulses bilaterally. Skin:  No rashes, lesions, or ulcers. Good turgor.     Lab/Data Reviewed:  BMP:   Lab Results   Component Value Date/Time     02/09/2019 03:20 AM    K 3.7 02/09/2019 03:20 AM     02/09/2019 03:20 AM    CO2 23 02/09/2019 03:20 AM    AGAP 8 02/09/2019 03:20 AM     (H) 02/09/2019 03:20 AM    BUN 44 (H) 02/09/2019 03:20 AM    CREA 2.47 (H) 02/09/2019 03:20 AM    GFRAA 23 (L) 02/09/2019 03:20 AM    GFRNA 19 (L) 02/09/2019 03:20 AM     CBC:   Lab Results   Component Value Date/Time    WBC 6.5 02/09/2019 03:20 AM    HGB 9.4 (L) 02/09/2019 03:20 AM    HCT 28.7 (L) 02/09/2019 03:20 AM     02/09/2019 03:20 AM        Scheduled Medications Reviewed:  Current Facility-Administered Medications   Medication Dose Route Frequency    0.9% sodium chloride infusion  100 mL/hr IntraVENous CONTINUOUS    [START ON 2/9/2019] amLODIPine (NORVASC) tablet 5 mg  5 mg Oral DAILY    famotidine (PEPCID) tablet 20 mg  20 mg Oral DAILY    simvastatin (ZOCOR) tablet 20 mg  20 mg Oral QHS    heparin (porcine) injection 5,000 Units  5,000 Units SubCUTAneous Q8H    [START ON 2/9/2019] levothyroxine (SYNTHROID) tablet 125 mcg  125 mcg Oral 6am    insulin lispro (HUMALOG) injection   SubCUTAneous AC&HS       Assessment/Plan     [de-identified] y.o. female with PMH of uterine cancer, HTN, HLD, spinal stenosis, and DM type 2, now admitted with oliguria and LOLIS.     Oliguria w/ LOLIS No history of kidney disease, patient's baseline is 1-1.1. Her Cr was 1.7 w/ PCM last week, now at 2.47; with very little urine output in cath bag. Nephrology was consulted. She has approx 3 days of nauesa/vomiting/diarrhea preceding urinary retension, likely significantly contributing to her LOLIS. UA is significant for moderate leuk esterase and 2+ bacteria. Urine creatinine 246; Urine sodium 38. Renal US showed mild hydronephrosis. Negative influenza.    - FU Nephrology recs - likely prerenal azotemia; FU I/O, continue IVF   - Urine culture pending  - Bladder checks as needed  - NS 150cc/hr for fluid resuscitation   - Continue kendrick   - Strict Is & Os  - Avoid nephrotoxic medications  - Hold home Januvia, metformin, furosemide, and valsarten  - daily BMP/CBC    Back Pain w/ Spinal Stenosis, osteoporosis, and frequent falls at home:  Urinary retention does not seem to be associated with a history of spinal stenosis. She does not have neurological deficits w/ the exception of decreased sensation of the dorsal services of her feet, which is baseline for her. She is able to move all extremities and her back pain is at baseline. She has a procedure scheduled in 4 days for back pain. MRI on 1/19 had moderate lumbar spondylosis w/ degenerative changes most pronounced at L3/L4.   - hold home alendronate 70mg weekly  - Fall Precautions  - Tylenol prn for pain     Diabetes Mellitus Type 2 w/ neuropathy Will hold home Januvia and metformin due to kidney injury. Will also hold gabapentin.   - SSI     HTN/HLD Will hold home furosemide and valsartan. BPs ranging 112-132/55-64.   - Continue home amlodipine 5mg daily  - Continue home simvastatin 20mg daily     Restless-leg Syndrome w/ muscle spasms  - home pramipexole 0.5mg tid held  - home tizanidine 4mg daily held     Hypothyroidism Levothyroxine 125mcg and levothyroxine 137mcg alternated every other day at home.   - levothyroxine 125mcg daily      Diet: Renal  DVT Prophylaxis: SQ  Code Status: DNR  Point of Contact: Betsy Saha (Relationship: daughter) [phone]       Justen Rice, Samia Mueller PGY-1  02/08/19 8:58 PM

## 2019-02-09 NOTE — ROUTINE PROCESS
Bedside, Verbal and Written shift change report given to [address]  (oncoming nurse) by Monie Cadena  (offgoing nurse). Report included the following information SBAR, Kardex, MAR and Accordion.

## 2019-02-09 NOTE — ROUTINE PROCESS
Bedside verbal report received from Butch Mueller RN to [place] oncoming nurse including SBAR.  KARDEX and MAR, Intake/Output

## 2019-02-09 NOTE — ROUTINE PROCESS
TRANSFER - IN REPORT:    Verbal report received from Timothy RN(name) on [address] [place] [address]  being received from ED(unit) for routine progression of care      Report consisted of patients Situation, Background, Assessment and   Recommendations(SBAR). Information from the following report(s) SBAR, Kardex, MAR and Accordion was reviewed with the receiving nurse. Opportunity for questions and clarification was provided. Assessment completed upon patients arrival to unit and care assumed.

## 2019-02-09 NOTE — ED NOTES
TRANSFER - OUT REPORT:    Verbal report given to Kalia Bo on Maryann Pinto  being transferred to [address] [place] [address] for routine progression of care       Report consisted of patients Situation, Background, Assessment and   Recommendations(SBAR). Information from the following report(s) SBAR was reviewed with the receiving nurse. Lines:   Peripheral IV 02/08/19 Left Hand (Active)        Opportunity for questions and clarification was provided.       Patient transported with:   [place]

## 2019-02-09 NOTE — ROUTINE PROCESS
Bedside shift change report given to [address] [place] [address] (oncoming nurse) by Krishna Yu RN (offgoing nurse).  Report included the following information SBAR, Kardex, ED Summary, Intake/Output, MAR, Recent Results and Med Rec Status     Patient being admitted from ED, awaiting arrival.

## 2019-02-09 NOTE — PROGRESS NOTES
Noted improvement in renal parameters , renal US showed mild hydronephrosis , doesn't explain the LOLIS. With brosk improvement with renal parameters , likely prerenal azotemia. I/O not charted , recommend keeping track of same. Continue IVF for now , follow renal numbers.      Please call with questions,    Chris Layne MD FASN  Cell [phone]  Pager: [phone]

## 2019-02-09 NOTE — PROGRESS NOTES
Agree this is likely prerenal failure from multiple meds (ARB, lasix). LOLIS is making a quick turnaround with fluids and holding meds confirming our working diagnosis. The mild hydro on the right side is likely a red herring and probably not contributing much at all to this LOLIS. Will follow up repeat labs in am.  Call me with questions.

## 2019-02-09 NOTE — PROGRESS NOTES
Received order from Dr. Jose Francisco Low at [facility] for clean catch urinalysis with next void.

## 2019-02-10 LAB
ANION GAP SERPL CALC-SCNC: 6 MMOL/L (ref 3–18)
BACTERIA SPEC CULT: ABNORMAL
BASOPHILS # BLD: 0 K/UL (ref 0–0.1)
BASOPHILS NFR BLD: 1 % (ref 0–2)
BUN SERPL-MCNC: 24 MG/DL (ref 7–18)
BUN/CREAT SERPL: 19 (ref 12–20)
CALCIUM SERPL-MCNC: 8.4 MG/DL (ref 8.5–10.1)
CHLORIDE SERPL-SCNC: 108 MMOL/L (ref 100–108)
CO2 SERPL-SCNC: 25 MMOL/L (ref 21–32)
CREAT SERPL-MCNC: 1.24 MG/DL (ref 0.6–1.3)
DIFFERENTIAL METHOD BLD: ABNORMAL
EOSINOPHIL # BLD: 0.3 K/UL (ref 0–0.4)
EOSINOPHIL NFR BLD: 4 % (ref 0–5)
ERYTHROCYTE [DISTWIDTH] IN BLOOD BY AUTOMATED COUNT: 14.2 % (ref 11.6–14.5)
GLUCOSE BLD STRIP.AUTO-MCNC: 159 MG/DL (ref 70–110)
GLUCOSE BLD STRIP.AUTO-MCNC: 185 MG/DL (ref 70–110)
GLUCOSE BLD STRIP.AUTO-MCNC: 194 MG/DL (ref 70–110)
GLUCOSE SERPL-MCNC: 153 MG/DL (ref 74–99)
HCT VFR BLD AUTO: 28.4 % (ref 35–45)
HGB BLD-MCNC: 9.7 G/DL (ref 12–16)
LYMPHOCYTES # BLD: 1.5 K/UL (ref 0.9–3.6)
LYMPHOCYTES NFR BLD: 23 % (ref 21–52)
MCH RBC QN AUTO: 28.6 PG (ref 24–34)
MCHC RBC AUTO-ENTMCNC: 34.2 G/DL (ref 31–37)
MCV RBC AUTO: 83.8 FL (ref 74–97)
MONOCYTES # BLD: 0.6 K/UL (ref 0.05–1.2)
MONOCYTES NFR BLD: 10 % (ref 3–10)
NEUTS SEG # BLD: 4.1 K/UL (ref 1.8–8)
NEUTS SEG NFR BLD: 62 % (ref 40–73)
PLATELET # BLD AUTO: 197 K/UL (ref 135–420)
PMV BLD AUTO: 9.5 FL (ref 9.2–11.8)
POTASSIUM SERPL-SCNC: 3.9 MMOL/L (ref 3.5–5.5)
RBC # BLD AUTO: 3.39 M/UL (ref 4.2–5.3)
SERVICE CMNT-IMP: ABNORMAL
SODIUM SERPL-SCNC: 139 MMOL/L (ref 136–145)
WBC # BLD AUTO: 6.5 K/UL (ref 4.6–13.2)

## 2019-02-10 PROCEDURE — 65270000029 HC RM PRIVATE

## 2019-02-10 PROCEDURE — 74011636637 HC RX REV CODE- 636/637: Performed by: FAMILY MEDICINE

## 2019-02-10 PROCEDURE — 36415 COLL VENOUS BLD VENIPUNCTURE: CPT

## 2019-02-10 PROCEDURE — 82962 GLUCOSE BLOOD TEST: CPT

## 2019-02-10 PROCEDURE — 74011250637 HC RX REV CODE- 250/637: Performed by: FAMILY MEDICINE

## 2019-02-10 PROCEDURE — 74011250636 HC RX REV CODE- 250/636: Performed by: PHYSICIAN ASSISTANT

## 2019-02-10 PROCEDURE — 80048 BASIC METABOLIC PNL TOTAL CA: CPT

## 2019-02-10 PROCEDURE — 85025 COMPLETE CBC W/AUTO DIFF WBC: CPT

## 2019-02-10 PROCEDURE — 74011250636 HC RX REV CODE- 250/636: Performed by: FAMILY MEDICINE

## 2019-02-10 RX ORDER — LEVOFLOXACIN 750 MG/1
750 TABLET ORAL
Status: DISCONTINUED | OUTPATIENT
Start: 2019-02-10 | End: 2019-02-11 | Stop reason: HOSPADM

## 2019-02-10 RX ADMIN — SODIUM CHLORIDE 100 ML/HR: 900 INJECTION, SOLUTION INTRAVENOUS at 06:43

## 2019-02-10 RX ADMIN — AMLODIPINE BESYLATE 5 MG: 5 TABLET ORAL at 11:51

## 2019-02-10 RX ADMIN — SIMVASTATIN 20 MG: 20 TABLET, FILM COATED ORAL at 23:59

## 2019-02-10 RX ADMIN — LEVOTHYROXINE SODIUM 125 MCG: 125 TABLET ORAL at 06:41

## 2019-02-10 RX ADMIN — HEPARIN SODIUM 5000 UNITS: 5000 INJECTION INTRAVENOUS; SUBCUTANEOUS at 23:59

## 2019-02-10 RX ADMIN — FAMOTIDINE 20 MG: 20 TABLET ORAL at 11:51

## 2019-02-10 RX ADMIN — INSULIN LISPRO 2 UNITS: 100 INJECTION, SOLUTION INTRAVENOUS; SUBCUTANEOUS at 16:56

## 2019-02-10 RX ADMIN — SIMVASTATIN 20 MG: 20 TABLET, FILM COATED ORAL at 01:01

## 2019-02-10 RX ADMIN — HEPARIN SODIUM 5000 UNITS: 5000 INJECTION INTRAVENOUS; SUBCUTANEOUS at 11:51

## 2019-02-10 RX ADMIN — INSULIN LISPRO 2 UNITS: 100 INJECTION, SOLUTION INTRAVENOUS; SUBCUTANEOUS at 11:57

## 2019-02-10 RX ADMIN — LEVOFLOXACIN 750 MG: 750 TABLET, FILM COATED ORAL at 18:30

## 2019-02-10 NOTE — PROGRESS NOTES
Cr has normalized this am 1.2mg/dl. This was likely all prerenal secondary to several of her meds as previously outlined. I doubt the mild right hydro was playing a role here, especially since Cr normalized with attention to the prerenal factors. She could always follow up with urology as an outpatient if desired. No need for us to follow. Will sign off, available as needed.

## 2019-02-10 NOTE — PROGRESS NOTES
Intern Progress Note  [facility]       Patient: Naren Marc MRN: [id number]  CSN: [id number]    YOB: 1940  Age: [de-identified] y.o. Sex: female    DOA: 2/8/2019 LOS:  LOS: 2 days                    Subjective:     Ms. Fredy Yancey is a [de-identified] y/o female with a PMHx of HTN, HLD, DM type 2, spinal stenosis, restless leg syndrome, and uterine cancer who is admitted for oliguira w/LOLIS. Acute events:   - No acute events overnight.    - Kendrick dc'ed yesterday  - c/o of inability to sense urination; new onset since having kendrick dc'ed   - also c/o whole body aches   - 1550 urine output last 24 hrs   - Cr improved to 1.24        Review of Systems   Constitutional: Negative for chills and fever. Respiratory: Negative for cough and shortness of breath. Cardiovascular: Negative for chest pain. Gastrointestinal: Negative for abdominal pain. Genitourinary: Negative for dysuria and hematuria. Musculoskeletal: Positive for back pain and myalgias. Objective:      Patient Vitals for the past 24 hrs:   Temp Pulse Resp BP SpO2   02/09/19 1940 97.3 °F (36.3 °C) 77 22 136/73 97 %   02/09/19 1511 97.8 °F (36.6 °C) 73 22 136/70 98 %   02/09/19 1142 98.5 °F (36.9 °C) 78 28 143/71 92 %   02/09/19 0742 97 °F (36.1 °C) 66 20 132/64 93 %   02/09/19 0422 99.4 °F (37.4 °C) 69 22 119/58 95 %         Intake/Output Summary (Last 24 hours) at 2/10/2019 0315  Last data filed at 2/9/2019 1429  Gross per 24 hour   Intake 701.67 ml   Output 2300 ml   Net -1598.33 ml       Physical Exam:   General:  Alert and Responsive and in No acute distress. HEENT: Conjunctiva pink, sclera anicteric. Pharynx moist, nonerythematous. Moist mucous membranes. CV:  RRR, no murmurs. No visible pulsations or thrills. RESP:  Unlabored breathing. Lungs clear to auscultation. no wheeze, rales, or rhonchi. Equal expansion bilaterally. ABD:  Soft, nontender, nondistended. MS:  No joint deformity or instability. Neuro:  5/5 strength bilateral upper extremities and lower extremities. A+Ox3. Ext:  No edema. 2+ radial and dp pulses bilaterally. Skin:  No rashes, lesions, or ulcers. Good turgor. Lab/Data Reviewed:  BMP:   Lab Results   Component Value Date/Time     02/09/2019 03:20 AM    K 3.7 02/09/2019 03:20 AM     02/09/2019 03:20 AM    CO2 23 02/09/2019 03:20 AM    AGAP 8 02/09/2019 03:20 AM     (H) 02/09/2019 03:20 AM    BUN 44 (H) 02/09/2019 03:20 AM    CREA 2.47 (H) 02/09/2019 03:20 AM    GFRAA 23 (L) 02/09/2019 03:20 AM    GFRNA 19 (L) 02/09/2019 03:20 AM     CBC:   Lab Results   Component Value Date/Time    WBC 6.5 02/09/2019 03:20 AM    HGB 9.4 (L) 02/09/2019 03:20 AM    HCT 28.7 (L) 02/09/2019 03:20 AM     02/09/2019 03:20 AM        Scheduled Medications Reviewed:  Current Facility-Administered Medications   Medication Dose Route Frequency    0.9% sodium chloride infusion  100 mL/hr IntraVENous CONTINUOUS    amLODIPine (NORVASC) tablet 5 mg  5 mg Oral DAILY    famotidine (PEPCID) tablet 20 mg  20 mg Oral DAILY    simvastatin (ZOCOR) tablet 20 mg  20 mg Oral QHS    heparin (porcine) injection 5,000 Units  5,000 Units SubCUTAneous Q8H    levothyroxine (SYNTHROID) tablet 125 mcg  125 mcg Oral 6am    insulin lispro (HUMALOG) injection   SubCUTAneous AC&HS       Assessment/Plan     [de-identified] y.o. female with PMH of uterine cancer, HTN, HLD, spinal stenosis, and DM type 2, now admitted with oliguria and LOLIS.     Oliguria w/ LOLIS- Cr 4.39 on admission; baseline Cr 1.0; had approx 3 days of n/v/d preceding urinary retention; UA shows mod leuk esterase and 2+ bacteria. Urine creatinine 246; Urine sodium 38. Renal US showed mild hydronephrosis. ; FeNA 0.49 suggests dehydration; Cr improved to 1.24 overnight     - Nephrology consulted; appreciate recs - likely prerenal azotemia  - Daily CBC, BMP   - Strict I&O's   - UCx shows 80,000 G- rods   - Bladder checks as needed  - DC NS at 100 mL/hr Back Pain w/ Spinal Stenosis, osteoporosis, and frequent falls at home- MRI on 1/19 had moderate lumbar spondylosis w/ degenerative changes most pronounced at L3/L4.   - Hold home Alendronate 70mg weekly  - Fall Precautions  - Tylenol prn for pain     Diabetes Mellitus Type 2 w/ neuropathy   - Hold home meds: Metformin, Januvia, and Gabapentin.   - SSI w/Accuchecks ACHS      HTN,HLD  - Hold home Lasix  - Restart home Valsartan 160 mg daily   - Continue home Amlodipine 5 mg daily  - Continue home Simvastatin 20 mg daily     Restless-leg Syndrome w/ muscle spasms  - Hold home Pramipexole 0.5mg tid, Tizanidine 4mg daily     Hypothyroidism Levothyroxine 125mcg and levothyroxine 137mcg alternated every other day at home. - Continue home Levothyroxine 125 mcg daily        Diet: Renal  DVT Prophylaxis: [facility]  Code Status: DNR  Point of Contact: Bharati Burr (Relationship: daughter) [phone]     RUDOLPH Feng DO   [facility]   February 10, 2019

## 2019-02-10 NOTE — PROGRESS NOTES
Spoke with pt and daughter, who had concerns about reduced sensation of urination and urgency. On physical exam, pt had intact sensation on palpation of legs, groin and near rectum. Pt reported that before admission, she was able to ambulate at home with walker as needed but said today that there are stairs to get into her home and she is unable to climb the stairs. Pt reported that before admission she would have to crawl up the stairs to enter house. All of pt's family members have stairs to enter their homes. Pt has not been evaluated by PT/OT but is agreeable for DC to SNF if she qualifies.     Caitlin Arreguin MD, PGY-2  [facility]  02/10/19 12:01 PM

## 2019-02-11 VITALS
SYSTOLIC BLOOD PRESSURE: 126 MMHG | DIASTOLIC BLOOD PRESSURE: 79 MMHG | HEIGHT: 61 IN | TEMPERATURE: 98.3 F | WEIGHT: 154.4 LBS | HEART RATE: 74 BPM | BODY MASS INDEX: 29.15 KG/M2 | OXYGEN SATURATION: 97 % | RESPIRATION RATE: 18 BRPM

## 2019-02-11 LAB
ANION GAP SERPL CALC-SCNC: 8 MMOL/L (ref 3–18)
BASOPHILS # BLD: 0 K/UL (ref 0–0.1)
BASOPHILS NFR BLD: 1 % (ref 0–2)
BUN SERPL-MCNC: 16 MG/DL (ref 7–18)
BUN/CREAT SERPL: 15 (ref 12–20)
CALCIUM SERPL-MCNC: 8.8 MG/DL (ref 8.5–10.1)
CHLORIDE SERPL-SCNC: 110 MMOL/L (ref 100–108)
CO2 SERPL-SCNC: 24 MMOL/L (ref 21–32)
CREAT SERPL-MCNC: 1.08 MG/DL (ref 0.6–1.3)
DIFFERENTIAL METHOD BLD: ABNORMAL
EOSINOPHIL # BLD: 0.3 K/UL (ref 0–0.4)
EOSINOPHIL NFR BLD: 6 % (ref 0–5)
ERYTHROCYTE [DISTWIDTH] IN BLOOD BY AUTOMATED COUNT: 14 % (ref 11.6–14.5)
GLUCOSE BLD STRIP.AUTO-MCNC: 152 MG/DL (ref 70–110)
GLUCOSE BLD STRIP.AUTO-MCNC: 209 MG/DL (ref 70–110)
GLUCOSE SERPL-MCNC: 162 MG/DL (ref 74–99)
HCT VFR BLD AUTO: 29.7 % (ref 35–45)
HGB BLD-MCNC: 10.1 G/DL (ref 12–16)
LYMPHOCYTES # BLD: 1.4 K/UL (ref 0.9–3.6)
LYMPHOCYTES NFR BLD: 25 % (ref 21–52)
MCH RBC QN AUTO: 28.5 PG (ref 24–34)
MCHC RBC AUTO-ENTMCNC: 34 G/DL (ref 31–37)
MCV RBC AUTO: 83.7 FL (ref 74–97)
MONOCYTES # BLD: 0.6 K/UL (ref 0.05–1.2)
MONOCYTES NFR BLD: 10 % (ref 3–10)
NEUTS SEG # BLD: 3.5 K/UL (ref 1.8–8)
NEUTS SEG NFR BLD: 58 % (ref 40–73)
PLATELET # BLD AUTO: 189 K/UL (ref 135–420)
PMV BLD AUTO: 9.7 FL (ref 9.2–11.8)
POTASSIUM SERPL-SCNC: 3.9 MMOL/L (ref 3.5–5.5)
RBC # BLD AUTO: 3.55 M/UL (ref 4.2–5.3)
SODIUM SERPL-SCNC: 142 MMOL/L (ref 136–145)
WBC # BLD AUTO: 5.9 K/UL (ref 4.6–13.2)

## 2019-02-11 PROCEDURE — 85025 COMPLETE CBC W/AUTO DIFF WBC: CPT

## 2019-02-11 PROCEDURE — 80048 BASIC METABOLIC PNL TOTAL CA: CPT

## 2019-02-11 PROCEDURE — 97165 OT EVAL LOW COMPLEX 30 MIN: CPT

## 2019-02-11 PROCEDURE — 97162 PT EVAL MOD COMPLEX 30 MIN: CPT

## 2019-02-11 PROCEDURE — 97116 GAIT TRAINING THERAPY: CPT

## 2019-02-11 PROCEDURE — 82962 GLUCOSE BLOOD TEST: CPT

## 2019-02-11 PROCEDURE — 36415 COLL VENOUS BLD VENIPUNCTURE: CPT

## 2019-02-11 PROCEDURE — 74011636637 HC RX REV CODE- 636/637: Performed by: FAMILY MEDICINE

## 2019-02-11 PROCEDURE — 74011250637 HC RX REV CODE- 250/637: Performed by: FAMILY MEDICINE

## 2019-02-11 RX ORDER — LEVOFLOXACIN 750 MG/1
750 TABLET ORAL
Qty: 3 TAB | Refills: 0 | Status: ON HOLD | OUTPATIENT
Start: 2019-02-12 | End: 2019-03-01

## 2019-02-11 RX ADMIN — INSULIN LISPRO 4 UNITS: 100 INJECTION, SOLUTION INTRAVENOUS; SUBCUTANEOUS at 12:14

## 2019-02-11 RX ADMIN — AMLODIPINE BESYLATE 5 MG: 5 TABLET ORAL at 08:35

## 2019-02-11 RX ADMIN — INSULIN LISPRO 2 UNITS: 100 INJECTION, SOLUTION INTRAVENOUS; SUBCUTANEOUS at 00:01

## 2019-02-11 RX ADMIN — INSULIN LISPRO 2 UNITS: 100 INJECTION, SOLUTION INTRAVENOUS; SUBCUTANEOUS at 08:34

## 2019-02-11 RX ADMIN — LEVOTHYROXINE SODIUM 125 MCG: 125 TABLET ORAL at 06:00

## 2019-02-11 RX ADMIN — FAMOTIDINE 20 MG: 20 TABLET ORAL at 08:35

## 2019-02-11 NOTE — PROGRESS NOTES
Patient discharged to snf with family at bedside via private vehicle; medication list reviewed using teach-back method, no further questions at this time; IV removed; vitals stable; skin is intact; pain is denied; belongings an discharge paperwork left with patient

## 2019-02-11 NOTE — PROGRESS NOTES
TRANSFER - OUT REPORT:    Verbal report given to [facility] RN(name) on Alex Botello  being transferred to [facility] for routine progression of care   Discharge to SNF, family providing transport    Report consisted of patients Situation, Background, Assessment and   Recommendations(SBAR). Information from the following report(s) SBAR, Kardex, ED Summary, Procedure Summary, MAR, Accordion and Recent Results was reviewed with the receiving nurse. Lines:   None    Opportunity for questions and clarification was provided.       Patient transported with:      Family Members  Cell phone  Rx  Personal Belongings

## 2019-02-11 NOTE — PROGRESS NOTES
FOC signed for [facility], matched in [place], all questions answered regarding SNF, transportation, etc. Pt's son in law will be able to transport to facility upon d/c. Updated attending, pt ready for d/c upon acceptance.        Anahi Le, MSW  Case Management  [phone]

## 2019-02-11 NOTE — PROGRESS NOTES
Problem: Mobility Impaired (Adult and Pediatric)  Goal: *Acute Goals and Plan of Care (Insert Text)  Physical Therapy Goals  Initiated 2/11/2019 and to be accomplished within 7 day(s)  1. Patient will move from supine to sit and sit to supine  in bed with independence. 2.  Patient will transfer from bed to chair and chair to bed with independence using the least restrictive device. 3.  Patient will perform sit to stand with independence. 4.  Patient will ambulate with modified independence for 250 feet with the least restrictive device. 5.  Patient will ascend/descend 8 stairs with 1 handrail(s) with supervision/set-up. Outcome: Progressing Towards Goal  physical Therapy EVALUATION    Patient: Maryann Pinto (76 y.o. female)  Date: 2/11/2019  Primary Diagnosis: LOLIS (acute kidney injury) ([facility] [address] [N17.9]  Urinary obstruction [N13.9]       Precautions: Fall       OBJECTIVE/ASSESSMENT :  Based on the objective data described below, the patient presents with impaired functional mobility including bed mobility, transfers, ambulation, and general activity tolerance following admission for acute kidney injury. Patient presented today sitting on EOB, cleared for participation by nursing and agreeable to physical therapy evaluation. Patient transferred sit-stand with SBA and ambulated with RW x 40 feet to stairs, slow gait with decreased B LE clearance. Attempted stairs due to pt having 8 to enter/exit home and pt was able to complete 2 with bilateral railings and presented with significant posterior lean. Pt returned to room with RW. She displayed intact sitting balance and fair standing balance. Pt does have a history of falling at home and it is concerning that when she returns home she will be unattended for a large portion of the day as her daughter works full time. At conclusion of session, patient left resting comfortably in bed with call bell in reach, needs met, and nurse notified.   Education:   [] Bed mobility  [x]         Transfers  [x]         Ambulation  []         Assistive device management  [x]         Stairs  [x]         Body mechanics  []         Position change  []         Activity pacing/energy conservation  []         Other:    Patient will benefit from skilled intervention to address the above impairments. Patients rehabilitation potential is considered to be Good  Factors which may influence rehabilitation potential include:   []         None noted  []         Mental ability/status  []         Medical condition  [x]         Home/family situation and support systems  []         Safety awareness  []         Pain tolerance/management  []         Other:      PLAN :  Recommendations and Planned Interventions:  [x]           Bed Mobility Training             [x]    Neuromuscular Re-Education  [x]           Transfer Training                   []    Orthotic/Prosthetic Training  [x]           Gait Training                          []    Modalities  [x]           Therapeutic Exercises          []    Edema Management/Control  [x]           Therapeutic Activities            [x]    Patient and Family Training/Education  []           Other (comment):    Frequency/Duration: Patient will be followed by physical therapy 1-2 times per day, 4-7 days per week to address goals. Discharge Recommendations: Skilled Nursing Facility  Further Equipment Recommendations for Discharge: N/A     SUBJECTIVE:   Patient stated I have to crawl up my stairs at home.     OBJECTIVE DATA SUMMARY:     Past Medical History:   Diagnosis Date    Acid reflux     Cancer ([facility] [address]     uterine    Diabetes ([facility] [address]     Dizzy spells     Essential hypertension     Gallbladder attack     Hearing loss     High cholesterol     Hypertension     Memory loss     Sinus complaint     SOB (shortness of breath)     Thyroid disease      Past Surgical History:   Procedure Laterality Date    HX APPENDECTOMY      HX CATARACT REMOVAL      HX CHOLECYSTECTOMY      HX HYSTERECTOMY      partial hysterectomy    HX ORTHOPAEDIC      5 back surgeries    HX ORTHOPAEDIC      hand arm and left knee    NEUROLOGICAL PROCEDURE UNLISTED  1993    Compression Fracture Surgery    NEUROLOGICAL PROCEDURE UNLISTED  1994    NEUROLOGICAL PROCEDURE UNLISTED  1995     Barriers to Learning/Limitations: None  Compensate with: N/A  Prior Level of Function/Home Situation: Pt lives in a 1 story home with 8 steps to enter, 1 railing. Pt lives with one of her daughters who works full time. Pt was ambulating in her home with a Rollator walker and was having difficulty entering/exiting her home. Home Situation  Home Environment: Private residence  # Steps to Enter: 8  Rails to Enter: Yes  Hand Rails : Left  One/Two Story Residence: One story  Living Alone: No  Support Systems: (Pt spends a large portion of the day home alone)  Patient Expects to be Discharged to[de-identified] Skilled nursing facility  Current DME Used/Available at Home: Walker, rollator  Critical Behavior:  Neurologic State: Alert     Strength:    Strength: Generally decreased, functional(B LEs)  Tone & Sensation:   Tone: Normal  Sensation: Intact   Range Of Motion:  AROM: Generally decreased, functional(B LEs)  Functional Mobility:  Bed Mobility:  Supine to Sit: Modified independent  Sit to Supine: Modified independent  Transfers:  Sit to Stand: Stand-by assistance  Stand to Sit: Stand-by assistance  Balance:   Sitting: Intact  Standing: Impaired; With support  Standing - Static: Fair  Standing - Dynamic : Fair  Ambulation/Gait Training:  Distance (ft): 75 Feet (ft)  Assistive Device: Walker, rolling  Ambulation - Level of Assistance: Contact guard assistance  Speed/Maya: Pace decreased (<100 feet/min)  Stairs:  Number of Stairs Trained: 2  Stairs - Level of Assistance: Contact guard assistance  Rail Use: Both    Pain:  Pre session: 0  Post session: low back (4/10) after ambulation/stairs negotiation  Activity Tolerance:   fair  Please refer to the flowsheet for vital signs taken during this treatment. After treatment:   [] Patient left in no apparent distress sitting up in chair  [x] Patient left sitting on EOB  [] Patient left in no apparent distress in bed  [] Patient declined to be OOB at this time due to   [x] Call bell left within reach  [x] Nursing notified([place])  [] Caregiver present  [] Bed alarm activated  [] SCDs in place    COMMUNICATION/EDUCATION:   [x]         Fall prevention education was provided and the patient/caregiver indicated understanding. [x]         Patient/family have participated as able in goal setting and plan of care. [x]         Patient/family agree to work toward stated goals and plan of care. []         Patient understands intent and goals of therapy, but is neutral about his/her participation. []         Patient is unable to participate in goal setting and plan of care.     Thank you for this referral.  Jerad Moeller, PT   Time Calculation: 24 mins        Eval Complexity: History: MEDIUM  Complexity : 1-2 comorbidities / personal factors will impact the outcome/ POC Exam:MEDIUM Complexity : 3 Standardized tests and measures addressing body structure, function, activity limitation and / or participation in recreation  Presentation: LOW Complexity : Stable, uncomplicated  Clinical Decision Making:Medium Complexity   Overall Complexity:MEDIUM

## 2019-02-11 NOTE — HOME CARE
Rounded on this \"[facility]\" patient. I left a brochure on [facility] with the patient. Patient informed this writer she is discharging to a facility. Per case management note, patient is discharging to [facility]. Olu Guerrero.

## 2019-02-11 NOTE — PROGRESS NOTES
D/C order noted for today. Pt has been accepted to [facility]. Confirmed with admissions that bed is available today. Met with pt who is agreeable to the transition plan today. Transport to facility has been arranged through family at 2pm time. Pt's discharge summary has been forwarded to SNF via [place]. Bedside RN has been updated to the plan. Discharge information has been updated on the AVS.  Please call report to [phone].     YANELIS Frias  Case Management  [phone]

## 2019-02-11 NOTE — DISCHARGE INSTRUCTIONS
Learning About Certified Diabetes Educators  What is a certified diabetes educator? Certified diabetes educators are health professionals who have special training to help you manage your diabetes. They may be:  · Registered nurses. · Registered dietitians. · Pharmacists. · Social workers. · Doctors. Your diabetes educator will give you tips and help with daily diabetes care. He or she also may teach classes. These classes give you a chance to learn from and connect with others who have diabetes. Why see a diabetes educator? Your doctor wants you to get the personal support and help that a diabetes educator can give. And most insurance plans will cover part or all of the cost.  Learning is a key part of living with diabetes. A diabetes educator teaches about the most important parts of your care. You will learn about:  · Eating healthy meals. · Being active. · Taking medicine. · Checking your blood sugar. · Dealing with your feelings about having diabetes. He or she can help you find ways to live better with diabetes. And your diabetes educator can show you small changes that can make a big difference in your daily routine and your health. If you need to make a big change, he or she will be able to answer your questions and guide you though each step. When should you see one? It can be helpful to see a diabetes educator at certain points in your care. He or she can:  · Get you started when you're first diagnosed with diabetes. · Check in once a year for a review of your health and daily routine. · Show you how to handle a new health problem along with your diabetes. · Help you work with a new health care team.  Follow-up care is a key part of your treatment and safety. Be sure to make and go to all appointments, and call your doctor if you are having problems. It's also a good idea to know your test results and keep a list of the medicines you take. Where can you learn more?   Go to http://nathalia-kam.info/. Enter H867 in the search box to learn more about \"Learning About Certified Diabetes Educators. \"  Current as of: July 25, 2018  Content Version: 11.9  © 5223-5250 "[employer]. Care instructions adapted under license by [employer] (which disclaims liability or warranty for this information). If you have questions about a medical condition or this instruction, always ask your healthcare professional. [place] [address] any warranty or liability for your use of this information. Dehydration: Care Instructions  Your Care Instructions  Dehydration happens when your body loses too much fluid. This might happen when you do not drink enough water or you lose large amounts of fluids from your body because of diarrhea, vomiting, or sweating. Severe dehydration can be life-threatening. Water and minerals called electrolytes help put your body fluids back in balance. Learn the early signs of fluid loss, and drink more fluids to prevent dehydration. Follow-up care is a key part of your treatment and safety. Be sure to make and go to all appointments, and call your doctor if you are having problems. It's also a good idea to know your test results and keep a list of the medicines you take. How can you care for yourself at home? · To prevent dehydration, drink plenty of fluids, enough so that your urine is light yellow or clear like water. Choose water and other caffeine-free clear liquids until you feel better. If you have kidney, heart, or liver disease and have to limit fluids, talk with your doctor before you increase the amount of fluids you drink. · If you do not feel like eating or drinking, try taking small sips of water, sports drinks, or other rehydration drinks. · Get plenty of rest.  To prevent dehydration  · Add more fluids to your diet and daily routine, unless your doctor has told you not to.   · During hot weather, drink more fluids. Drink even more fluids if you exercise a lot. Stay away from drinks with alcohol or caffeine. · Watch for the symptoms of dehydration. These include:  ? A dry, sticky mouth. ? Dark yellow urine, and not much of it. ? Dry and sunken eyes. ? Feeling very tired. · Learn what problems can lead to dehydration. These include:  ? Diarrhea, fever, and vomiting. ? Any illness with a fever, such as pneumonia or the flu. ? Activities that cause heavy sweating, such as endurance races and heavy outdoor work in hot or humid weather. ? Alcohol or drug abuse or withdrawal.  ? Certain medicines, such as cold and allergy pills (antihistamines), diet pills (diuretics), and laxatives. ? Certain diseases, such as diabetes, cancer, and heart or kidney disease. When should you call for help? Call 911 anytime you think you may need emergency care. For example, call if:    · You passed out (lost consciousness).    Call your doctor now or seek immediate medical care if:    · You are confused and cannot think clearly.     · You are dizzy or lightheaded, or you feel like you may faint.     · You have signs of needing more fluids. You have sunken eyes and a dry mouth, and you pass only a little dark urine.     · You cannot keep fluids down.    Watch closely for changes in your health, and be sure to contact your doctor if:    · You are not making tears.     · Your skin is very dry and sags slowly back into place after you pinch it.     · Your mouth and eyes are very dry. Where can you learn more? Go to http://nathalia-kam.info/. Enter L365 in the search box to learn more about \"Dehydration: Care Instructions. \"  Current as of: September 23, 2018  Content Version: 11.9  © 5966-8351 [employer]. Care instructions adapted under license by [employer] (which disclaims liability or warranty for this information).  If you have questions about a medical condition or this instruction, always ask your healthcare professional. [place] [address] any warranty or liability for your use of this information. Kidney Disease and Diabetes: Care Instructions  Your Care Instructions    When you have diabetes, your body cannot make enough insulin or use it the way it should. Your body needs insulin to help sugar move from the blood to the cells. Without it, your blood sugar gets too high. High blood sugar damages your kidneys and makes it hard for them to filter blood. This causes fluid and waste to build up in your blood. If you have diabetes, it is very important to keep your blood sugar in your target range. There are many steps you can take to do this. If you can control your blood sugar, you will have the best chance to slow or stop damage to your kidneys. Follow-up care is a key part of your treatment and safety. Be sure to make and go to all appointments, and call your doctor if you are having problems. It's also a good idea to know your test results and keep a list of the medicines you take. How can you care for yourself at home? To control your diabetes and slow or stop damage to your kidneys  · Keep your blood sugar in your target range. The American Diabetes Association recommends a hemoglobin A1c (Hb A1c) target level of less than 7%. Talk to your doctor about your target. The lower your A1c, the better your chance of stopping kidney damage. · Keep your blood pressure in your target range. Doctors recommend specific types of blood pressure medicines for people who have diabetes and kidney disease. Examples include ACE inhibitors and angiotensin II receptor blockers (ARBs). Your doctor may have you take one of these even if you don't have high blood pressure. · Take all of your medicines. You may have several. For example, you may take medicines for diabetes, cholesterol, and blood pressure.  It's very important to take all of them just as your doctor tells you to and to keep taking them. · Make good food choices. Follow an eating plan that is best for your diabetes and your kidneys. You may want to work with a dietitian to make a plan. A good plan will make sure that you spread carbohydrate throughout the day. It will also make sure that you get the right amount of salt (sodium), fluids, and protein. · Stay at a healthy weight. If you need help to lose weight, talk to your doctor or dietitian. Even small changes can make a difference. Try to be aware of your portion sizes, eat more fruits and vegetables, and add some activity to your daily routine. · Exercise. Get at least 30 minutes of activity on most days of the week. Walking is a great exercise that most people can do. Being more active can help you control your blood sugar and stay at a healthy weight. It also can help you lower cholesterol and blood pressure. To improve your kidney health  · Lower your cholesterol. Keep your LDL less than 100 mg/dL and HDL levels more than 40 mg/dL for men and 50 mg/dL for women. If you have high cholesterol, your doctor may prescribe medicine. He or she may also tell you to eat less saturated fat. · Follow your treatment plan. Check your blood sugar as many times a day as your doctor recommends. Go to all of your follow-up appointments, and be sure to have all the tests your doctor orders. Call your doctor if you think you are having a problem with your medicines. · Take a low-dose aspirin every day if your doctor suggests it. Most doctors believe this can reduce the risk of heart disease and stroke. Your risk of these diseases is much greater than your risk of kidney failure. · Avoid tobacco. Do not smoke or use other tobacco products. If you need help quitting, talk to your doctor about stop-smoking programs and medicines. These can increase your chances of quitting for good. When should you call for help? Call 911 anytime you think you may need emergency care. For example, call if:    · You passed out (lost consciousness).    Call your doctor now or seek immediate medical care if:    · You have new or worse nausea and vomiting.     · You have much less urine than normal, or you have no urine.     · You are feeling confused or cannot think clearly.     · You have new or more blood in your urine.     · You have new swelling.     · You are dizzy or lightheaded, or you feel like you may faint.    Watch closely for changes in your health, and be sure to contact your doctor if:    · You do not get better as expected. Where can you learn more? Go to http://nathalia-kam.info/. Enter C726 in the search box to learn more about \"Kidney Disease and Diabetes: Care Instructions. \"  Current as of: March 14, 2018  Content Version: 11.9  © 5112-4262 [employer]. Care instructions adapted under license by [employer] (which disclaims liability or warranty for this information). If you have questions about a medical condition or this instruction, always ask your healthcare professional. [place] [address] any warranty or liability for your use of this information. Patient Education        Dehydration: Care Instructions  Your Care Instructions  Dehydration happens when your body loses too much fluid. This might happen when you do not drink enough water or you lose large amounts of fluids from your body because of diarrhea, vomiting, or sweating. Severe dehydration can be life-threatening. Water and minerals called electrolytes help put your body fluids back in balance. Learn the early signs of fluid loss, and drink more fluids to prevent dehydration. Follow-up care is a key part of your treatment and safety. Be sure to make and go to all appointments, and call your doctor if you are having problems. It's also a good idea to know your test results and keep a list of the medicines you take.   How can you care for yourself at home?  · To prevent dehydration, drink plenty of fluids, enough so that your urine is light yellow or clear like water. Choose water and other caffeine-free clear liquids until you feel better. If you have kidney, heart, or liver disease and have to limit fluids, talk with your doctor before you increase the amount of fluids you drink. · If you do not feel like eating or drinking, try taking small sips of water, sports drinks, or other rehydration drinks. · Get plenty of rest.  To prevent dehydration  · Add more fluids to your diet and daily routine, unless your doctor has told you not to. · During hot weather, drink more fluids. Drink even more fluids if you exercise a lot. Stay away from drinks with alcohol or caffeine. · Watch for the symptoms of dehydration. These include:  ? A dry, sticky mouth. ? Dark yellow urine, and not much of it. ? Dry and sunken eyes. ? Feeling very tired. · Learn what problems can lead to dehydration. These include:  ? Diarrhea, fever, and vomiting. ? Any illness with a fever, such as pneumonia or the flu. ? Activities that cause heavy sweating, such as endurance races and heavy outdoor work in hot or humid weather. ? Alcohol or drug abuse or withdrawal.  ? Certain medicines, such as cold and allergy pills (antihistamines), diet pills (diuretics), and laxatives. ? Certain diseases, such as diabetes, cancer, and heart or kidney disease. When should you call for help? Call 911 anytime you think you may need emergency care. For example, call if:    · You passed out (lost consciousness).    Call your doctor now or seek immediate medical care if:    · You are confused and cannot think clearly.     · You are dizzy or lightheaded, or you feel like you may faint.     · You have signs of needing more fluids.  You have sunken eyes and a dry mouth, and you pass only a little dark urine.     · You cannot keep fluids down.    Watch closely for changes in your health, and be sure to contact your doctor if:    · You are not making tears.     · Your skin is very dry and sags slowly back into place after you pinch it.     · Your mouth and eyes are very dry. Where can you learn more? Go to http://nathalia-kam.info/. Enter G335 in the search box to learn more about \"Dehydration: Care Instructions. \"  Current as of: September 23, 2018  Content Version: 11.9  © 3529-5574 [employer]. Care instructions adapted under license by [employer] (which disclaims liability or warranty for this information). If you have questions about a medical condition or this instruction, always ask your healthcare professional. [place] [address] any warranty or liability for your use of this information. DISCHARGE SUMMARY from Nurse    PATIENT INSTRUCTIONS:    After general anesthesia or intravenous sedation, for 24 hours or while taking prescription Narcotics:  · Limit your activities  · Do not drive and operate hazardous machinery  · Do not make important personal or business decisions  · Do  not drink alcoholic beverages  · If you have not urinated within 8 hours after discharge, please contact your surgeon on call. Report the following to your surgeon:  · Excessive pain, swelling, redness or odor of or around the surgical area  · Temperature over 100.5  · Nausea and vomiting lasting longer than 4 hours or if unable to take medications  · Any signs of decreased circulation or nerve impairment to extremity: change in color, persistent  numbness, tingling, coldness or increase pain  · Any questions    What to do at Home:  Recommended activity: Activity as tolerated,     If you experience any of the following symptoms chest pain, bleeding, unable to urinate, fever, dizziness, please call 911. *  Please give a list of your current medications to your Primary Care Provider.     *  Please update this list whenever your medications are discontinued, doses are      changed, or new medications (including over-the-counter products) are added. *  Please carry medication information at all times in case of emergency situations. These are general instructions for a healthy lifestyle:    No smoking/ No tobacco products/ Avoid exposure to second hand smoke  Surgeon General's Warning:  Quitting smoking now greatly reduces serious risk to your health. Obesity, smoking, and sedentary lifestyle greatly increases your risk for illness    A healthy diet, regular physical exercise & weight monitoring are important for maintaining a healthy lifestyle    You may be retaining fluid if you have a history of heart failure or if you experience any of the following symptoms:  Weight gain of 3 pounds or more overnight or 5 pounds in a week, increased swelling in our hands or feet or shortness of breath while lying flat in bed. Please call your doctor as soon as you notice any of these symptoms; do not wait until your next office visit. Recognize signs and symptoms of STROKE:    F-face looks uneven    A-arms unable to move or move unevenly    S-speech slurred or non-existent    T-time-call 911 as soon as signs and symptoms begin-DO NOT go       Back to bed or wait to see if you get better-TIME IS BRAIN. Warning Signs of HEART ATTACK     Call 911 if you have these symptoms:   Chest discomfort. Most heart attacks involve discomfort in the center of the chest that lasts more than a few minutes, or that goes away and comes back. It can feel like uncomfortable pressure, squeezing, fullness, or pain.  Discomfort in other areas of the upper body. Symptoms can include pain or discomfort in one or both arms, the back, neck, jaw, or stomach.  Shortness of breath with or without chest discomfort.  Other signs may include breaking out in a cold sweat, nausea, or lightheadedness. Don't wait more than five minutes to call 911 - MINUTES MATTER! Fast action can save your life. Calling 911 is almost always the fastest way to get lifesaving treatment. Emergency Medical Services staff can begin treatment when they arrive -- up to an hour sooner than if someone gets to the hospital by car. The discharge information has been reviewed with the patient. The patient verbalized understanding. Discharge medications reviewed with the patient and appropriate educational materials and side effects teaching were provided.   ___________________________________________________________________________________________________________________________________

## 2019-02-11 NOTE — PROGRESS NOTES
Problem: Self Care Deficits Care Plan (Adult)  Goal: *Acute Goals and Plan of Care (Insert Text)  Occupational Therapy Goals  Initiated 2/11/2019 within 7 day(s). 1.  Patient will perform lower body dressing with modified independence. 2.  Patient will perform functional task in standing for 5 minutes with supervision for balance. 3.  Patient will perform toilet transfers with modified independence. 4.  Patient will participate in upper extremity therapeutic exercise/activities with modified independence for 8 minutes to increase strength/endurance for ADLs. Outcome: Progressing Towards Goal  Occupational Therapy EVALUATION    Patient: Nerissa Quinteros (69 y.o. female)  Date: 2/11/2019  Primary Diagnosis: LOLIS (acute kidney injury) ([facility] [address] [N17.9]  Urinary obstruction [N13.9]       Precautions:   Fall    ASSESSMENT :  Based on the objective data described below, the patient presents with decreased LB ADLs, decreased functional mobility and muscle weakness/poor endurance. She fatigues easily especially with UE tasks over shoulder level. Patient motivated to care for herself but limited by decreased balance in standing. She has a supportive daughter but stays home alone most of the day. Recommend continued therapy at SNF to maximize her functional independence for safe return to home. Patient will benefit from skilled intervention to address the above impairments.   Patients rehabilitation potential is considered to be Good  Factors which may influence rehabilitation potential include:   []             None noted  []             Mental ability/status  [x]             Medical condition  []             Home/family situation and support systems  []             Safety awareness  []             Pain tolerance/management  []             Other:      PLAN :  Recommendations and Planned Interventions:   [x]               Self Care Training                  [x]        Therapeutic Activities  [x] Functional Mobility Training    []        Cognitive Retraining  [x]               Therapeutic Exercises           [x]        Endurance Activities  [x]               Balance Training                   []        Neuromuscular Re-Education  []               Visual/Perceptual Training     [x]   Home Safety Training  [x]               Patient Education                 [x]        Family Training/Education  []               Other (comment):    Frequency/Duration: Patient will be followed by occupational therapy 1-2 times per day/4-7 days per week to address goals. Discharge Recommendations: Galdino Mora  Further Equipment Recommendations for Discharge: N/A     Barriers to Learning/Limitations: None  Compensate with: visual, verbal, tactile, kinesthetic cues/model     PATIENT COMPLEXITY      Eval Complexity: History: LOW Complexity : Brief history review ; Examination: LOW Complexity : 1-3 performance deficits relating to physical, cognitive , or psychosocial skils that result in activity limitations and / or participation restrictions ; Decision Making:LOW Complexity : No comorbidities that affect functional and no verbal or physical assistance needed to complete eval tasks  Assessment: Low Complexity     SUBJECTIVE:   Patient stated I get tired so quickly.     OBJECTIVE DATA SUMMARY:     Past Medical History:   Diagnosis Date    Acid reflux     Cancer ([facility] [address]     uterine    Diabetes ([facility] [address]     Dizzy spells     Essential hypertension     Gallbladder attack     Hearing loss     High cholesterol     Hypertension     Memory loss     Sinus complaint     SOB (shortness of breath)     Thyroid disease      Past Surgical History:   Procedure Laterality Date    HX APPENDECTOMY      HX CATARACT REMOVAL      HX CHOLECYSTECTOMY      HX HYSTERECTOMY      partial hysterectomy    HX ORTHOPAEDIC      5 back surgeries    HX ORTHOPAEDIC      hand arm and left knee   [address] Compression Fracture Surgery    NEUROLOGICAL PROCEDURE UNLISTED  1994    NEUROLOGICAL PROCEDURE UNLISTED  1995     Prior Level of Function/Home Situation: Pt was modified independent with basic self care tasks and functional mobility PTA. Home Situation  Home Environment: Private residence  # Steps to Enter: 8  Rails to Enter: Yes  Hand Rails : Left  One/Two Story Residence: One story  Living Alone: No  Support Systems: (Pt spends a large portion of the day home alone)  Patient Expects to be Discharged to[de-identified] Skilled nursing facility  Current DME Used/Available at Home: Roxanna Zavala, rollator  Tub or Shower Type: Shower  [x]  Right hand dominant   []  Left hand dominant  Cognitive/Behavioral Status:  Neurologic State: Alert  Orientation Level: Oriented X4  Cognition: Appropriate decision making; Follows commands  Safety/Judgement: Awareness of environment; Fall prevention    Skin: Intact on UEs    Edema: None noted in UEs    Vision/Perceptual:     Acuity: Within Defined Limits      Coordination:  Coordination: Within functional limits  Fine Motor Skills-Upper: Left Intact; Right Intact    Gross Motor Skills-Upper: Left Intact; Right Intact    Balance:  Sitting: Intact  Standing: Impaired; With support  Standing - Static: Fair  Standing - Dynamic : Fair    Strength:  Strength: Generally decreased, functional(UEs; 4/5 throughout)    Tone & Sensation:  Tone: Normal(UEs)  Sensation: Intact(UEs)    Range of Motion:  AROM: Within functional limits(UEs); except for decreased right shoulder flexion from previous injury    Functional Mobility and Transfers for ADLs:  Bed Mobility:  Supine to Sit: Modified independent  Sit to Supine: Modified independent  Transfers:  Sit to Stand: Stand-by assistance   Toilet Transfer : Stand-by assistance     ADL Assessment:  Feeding: Modified independent    Oral Facial Hygiene/Grooming: Modified Independent    Bathing: Stand-by assistance    Upper Body Dressing: Modified independent    Lower Body Dressing: Stand-by assistance    Toileting: Supervision     Pain:  Pt reports 0/10 pain or discomfort prior to treatment.    Pt reports 0/10 pain or discomfort post treatment. Activity Tolerance:   Good    Please refer to the flowsheet for vital signs taken during this treatment. After treatment:   [] Patient left in no apparent distress sitting up in chair  [x] Patient left in no apparent distress in bed  [x] Call bell left within reach  [] Nursing notified  [] Caregiver present  [] Bed alarm activated    COMMUNICATION/EDUCATION:   [x] Home safety education was provided and the patient/caregiver indicated understanding. [x] Patient/family have participated as able in goal setting and plan of care. [x] Patient/family agree to work toward stated goals and plan of care. [] Patient understands intent and goals of therapy, but is neutral about his/her participation. [] Patient is unable to participate in goal setting and plan of care.     Thank you for this referral.  Keny Zamorano MS OTR/L  Time Calculation: 16 mins

## 2019-02-11 NOTE — PROGRESS NOTES
Intern Progress Note  [facility]       Patient: Frankey Dame MRN: [id number]  CSN: [id number]    YOB: 1940  Age: [de-identified] y.o. Sex: female    DOA: 2/8/2019 LOS:  LOS: 3 days                    Subjective:     Ms. El Cruz is a [de-identified] y/o female with a PMHx of HTN, HLD, DM type 2, spinal stenosis, restless leg syndrome, and uterine cancer who is admitted for oliguira w/LOLIS. Acute events:   - c/o of inability to sense urination; no dysuria reported  - 1550 urine output last 24 hrs   - Cr improved to 1.24        Review of Systems   Constitutional: Negative for chills and fever. HENT: Negative for sore throat. Respiratory: Negative for cough, shortness of breath and stridor. Cardiovascular: Negative for chest pain, palpitations and leg swelling. Gastrointestinal: Negative for abdominal pain, nausea and vomiting. Genitourinary: Negative for dysuria and hematuria. Neurological: Negative for headaches. Objective:      Patient Vitals for the past 24 hrs:   Temp Pulse Resp BP SpO2   02/11/19 0339 97.6 °F (36.4 °C) 61 18 132/67 97 %   02/10/19 2314 98.1 °F (36.7 °C) 75 18 127/69 97 %   02/10/19 1943 97.5 °F (36.4 °C) 68 16 118/63 98 %   02/10/19 1517 97 °F (36.1 °C) 67 18 148/71 98 %   02/10/19 1202 97 °F (36.1 °C) 66 18 146/80 100 %   02/10/19 0756 97.2 °F (36.2 °C) 66 18 152/74 98 %       Intake/Output Summary (Last 24 hours) at 2/11/2019 0655  Last data filed at 2/11/2019 0646  Gross per 24 hour   Intake --   Output 800 ml   Net -800 ml     Physical Exam:   General:  Alert and Responsive. No acute distress. CV:  RRR, no murmurs. No visible pulsations or thrills. RESP:  Unlabored breathing. Lungs clear to auscultation. no wheeze, rales, or rhonchi. Equal expansion bilaterally. ABD:  Soft, nontender, nondistended. Ext:  No edema. 2+ radial and dp pulses bilaterally.     Lab/Data Reviewed:  BMP:   Lab Results   Component Value Date/Time     02/11/2019 02:21 AM    K 3.9 02/11/2019 02:21 AM     (H) 02/11/2019 02:21 AM    CO2 24 02/11/2019 02:21 AM    AGAP 8 02/11/2019 02:21 AM     (H) 02/11/2019 02:21 AM    BUN 16 02/11/2019 02:21 AM    CREA 1.08 02/11/2019 02:21 AM    GFRAA 59 (L) 02/11/2019 02:21 AM    GFRNA 49 (L) 02/11/2019 02:21 AM     CBC:   Lab Results   Component Value Date/Time    WBC 5.9 02/11/2019 02:21 AM    HGB 10.1 (L) 02/11/2019 02:21 AM    HCT 29.7 (L) 02/11/2019 02:21 AM     02/11/2019 02:21 AM        Scheduled Medications Reviewed:  Current Facility-Administered Medications   Medication Dose Route Frequency    levoFLOXacin (LEVAQUIN) tablet 750 mg  750 mg Oral Q48H    amLODIPine (NORVASC) tablet 5 mg  5 mg Oral DAILY    famotidine (PEPCID) tablet 20 mg  20 mg Oral DAILY    simvastatin (ZOCOR) tablet 20 mg  20 mg Oral QHS    heparin (porcine) injection 5,000 Units  5,000 Units SubCUTAneous Q8H    levothyroxine (SYNTHROID) tablet 125 mcg  125 mcg Oral 6am    insulin lispro (HUMALOG) injection   SubCUTAneous AC&HS       Assessment/Plan     [de-identified] y.o. female with PMH of uterine cancer, HTN, HLD, spinal stenosis, and DM type 2, now admitted with oliguria and LOLIS.     Oliguria w/ LOLIS, resolved- Cr 4.39 on admission; baseline Cr 1.0; had approx 3 days of n/v/d preceding urinary retention; UA shows mod leuk esterase and 2+ bacteria. Urine creatinine 246; Urine sodium 38. Renal US showed mild hydronephrosis;  FeNA 0.49 suggests dehydration; Cr improved to 1.08 overnight     - Nephrology consulted; likely prerenal azotemia  - Daily CBC, BMP  - Strict I&O's  - UCx shows 80,000 CFU e coli  - Levaquin 750 mg q48h for 3 doses started 02/10/19  - Bladder checks as needed    Back Pain w/ Spinal Stenosis, osteoporosis, and frequent falls at home- MRI on 1/19 had moderate lumbar spondylosis w/ degenerative changes most pronounced at L3/L4.  - Hold home Alendronate 70mg weekly  - Fall Precautions  -Tylenol prn for pain     Diabetes Mellitus Type 2 w/ neuropathy; -194  - Hold home meds: Metformin, Januvia, and Gabapentin.   - SSI w/Accuchecks ACHS      HTN,HLD  - Hold home Lasix  - Restart home Valsartan 160 mg daily   - Continue home Amlodipine 5 mg daily  - Continue home Simvastatin 20 mg daily     Restless-leg Syndrome w/ muscle spasms  - Hold home Pramipexole 0.5mg tid, Tizanidine 4mg daily     Hypothyroidism Levothyroxine 125mcg and levothyroxine 137mcg alternated every other day at home.   - Continue home Levothyroxine 125 mcg daily        Diet: Renal  DVT Prophylaxis: [facility]  Code Status: DNR  Point of Contact: Rise Kehr (Relationship: daughter) [phone]     Dinesh Vallejo DO, PGY-1   [facility] [address] Medicine   Intern Pager: [phone]   February 11, 2019, 6:55 AM

## 2019-02-11 NOTE — ROUTINE PROCESS
Bedside shift change report given to Wendy (oncoming nurse) by Alicia Kwon (offgoing nurse). Report included the following information SBAR, Kardex and MAR.

## 2019-02-11 NOTE — PROGRESS NOTES
Reason for Admission:  LOLIS (acute kidney injury) ([facility] [address] [N17.9]  Urinary obstruction [N13.9]                 RRAT Score:    19            Plan for utilizing home health:    N/a, SNF                      Likelihood of Readmission:   Moderate                         Do you (patient/family) have any concerns for transition/discharge?  no    Transition of Care Plan:       Initial assessment completed with patient. Cognitive status of patient: oriented to time, place, person and situation. Face sheet information confirmed:  yes. The patient designates daughter, Felix Bran, to participate in his/her discharge plan and to receive any needed information. This patient lives in a single family home with patient, daughter and other:  Son in law. Patient is not able to navigate steps as needed. Prior to hospitalization, patient was considered to be independent with ADLs/IADLS : no . If not independent,  patient needs assist with : pt began having issues with walking. 2    Patient has a current ACP document on file: no  The other:  Son in law, will be available to transport patient home upon discharge. The patient already has [employer]  medical equipment available in the home. Patient is not currently active with home health. Patient has not stayed in a skilled nursing facility or rehab. This patient is on dialysis :no     List of available SNF agencies were provided and reviewed with the patient prior to discharge. Freedom of choice signed: yes, for [place] [facility]. Currently, the discharge plan is SNF. The patient states that she can obtain her medications from the pharmacy, and take her medications as directed. Patient's current insurance is Medicare, [employer]      Care Management Interventions  PCP Verified by CM:  Yes  Palliative Care Criteria Met (RRAT>21 & CHF Dx)?: No  Mode of Transport at Discharge: Self  Transition of Care Consult (CM Consult): SNF  Partner SNF: No  Reason Why Partner SNF Not Chosen: Location  MyChart Signup: No  Discharge Durable Medical Equipment: No  Physical Therapy Consult: Yes  Occupational Therapy Consult: Yes  Speech Therapy Consult: No  Current Support Network: Relative's Home  Confirm Follow Up Transport: Family  Plan discussed with Pt/Family/Caregiver: Yes  Freedom of Choice Offered: Yes  [address] Provided?: No  Discharge Location  Discharge Placement: Skilled nursing facility    YANELIS Arce  Case Management  [phone]

## 2019-02-28 ENCOUNTER — APPOINTMENT (OUTPATIENT)
Dept: CT IMAGING | Age: 79
DRG: 310 | End: 2019-02-28
Attending: EMERGENCY MEDICINE
Payer: MEDICARE

## 2019-02-28 ENCOUNTER — APPOINTMENT (OUTPATIENT)
Dept: VASCULAR SURGERY | Age: 79
DRG: 310 | End: 2019-02-28
Attending: EMERGENCY MEDICINE
Payer: MEDICARE

## 2019-02-28 ENCOUNTER — APPOINTMENT (OUTPATIENT)
Dept: NUCLEAR MEDICINE | Age: 79
DRG: 310 | End: 2019-02-28
Attending: EMERGENCY MEDICINE
Payer: MEDICARE

## 2019-02-28 ENCOUNTER — APPOINTMENT (OUTPATIENT)
Dept: GENERAL RADIOLOGY | Age: 79
DRG: 310 | End: 2019-02-28
Attending: EMERGENCY MEDICINE
Payer: MEDICARE

## 2019-02-28 ENCOUNTER — HOSPITAL ENCOUNTER (INPATIENT)
Age: 79
LOS: 1 days | Discharge: HOME HEALTH | DRG: 310 | End: 2019-03-01
Attending: EMERGENCY MEDICINE | Admitting: FAMILY MEDICINE
Payer: MEDICARE

## 2019-02-28 DIAGNOSIS — I48.91 ATRIAL FIBRILLATION WITH RVR (HCC): Primary | ICD-10-CM

## 2019-02-28 DIAGNOSIS — I48.91 NEW ONSET A-FIB (HCC): ICD-10-CM

## 2019-02-28 LAB
ALBUMIN SERPL-MCNC: 3.5 G/DL (ref 3.4–5)
ALBUMIN/GLOB SERPL: 1.1 {RATIO} (ref 0.8–1.7)
ALP SERPL-CCNC: 67 U/L (ref 45–117)
ALT SERPL-CCNC: 13 U/L (ref 13–56)
ANION GAP SERPL CALC-SCNC: 11 MMOL/L (ref 3–18)
AST SERPL-CCNC: 12 U/L (ref 15–37)
ATRIAL RATE: 117 BPM
BASOPHILS # BLD: 0 K/UL (ref 0–0.1)
BASOPHILS NFR BLD: 0 % (ref 0–2)
BILIRUB SERPL-MCNC: 0.6 MG/DL (ref 0.2–1)
BUN SERPL-MCNC: 12 MG/DL (ref 7–18)
BUN/CREAT SERPL: 12 (ref 12–20)
CALCIUM SERPL-MCNC: 9.3 MG/DL (ref 8.5–10.1)
CALCULATED R AXIS, ECG10: 27 DEGREES
CALCULATED T AXIS, ECG11: -14 DEGREES
CHLORIDE SERPL-SCNC: 106 MMOL/L (ref 100–108)
CO2 SERPL-SCNC: 24 MMOL/L (ref 21–32)
CREAT SERPL-MCNC: 1 MG/DL (ref 0.6–1.3)
DIAGNOSIS, 93000: NORMAL
DIFFERENTIAL METHOD BLD: ABNORMAL
EOSINOPHIL # BLD: 0.1 K/UL (ref 0–0.4)
EOSINOPHIL NFR BLD: 1 % (ref 0–5)
ERYTHROCYTE [DISTWIDTH] IN BLOOD BY AUTOMATED COUNT: 14.1 % (ref 11.6–14.5)
GLOBULIN SER CALC-MCNC: 3.1 G/DL (ref 2–4)
GLUCOSE SERPL-MCNC: 139 MG/DL (ref 74–99)
HCT VFR BLD AUTO: 29.3 % (ref 35–45)
HGB BLD-MCNC: 9.9 G/DL (ref 12–16)
LYMPHOCYTES # BLD: 1.5 K/UL (ref 0.9–3.6)
LYMPHOCYTES NFR BLD: 14 % (ref 21–52)
MCH RBC QN AUTO: 28.3 PG (ref 24–34)
MCHC RBC AUTO-ENTMCNC: 33.8 G/DL (ref 31–37)
MCV RBC AUTO: 83.7 FL (ref 74–97)
MONOCYTES # BLD: 1 K/UL (ref 0.05–1.2)
MONOCYTES NFR BLD: 9 % (ref 3–10)
NEUTS SEG # BLD: 7.8 K/UL (ref 1.8–8)
NEUTS SEG NFR BLD: 76 % (ref 40–73)
PLATELET # BLD AUTO: 196 K/UL (ref 135–420)
PMV BLD AUTO: 9.5 FL (ref 9.2–11.8)
POTASSIUM SERPL-SCNC: 3.7 MMOL/L (ref 3.5–5.5)
PROT SERPL-MCNC: 6.6 G/DL (ref 6.4–8.2)
Q-T INTERVAL, ECG07: 322 MS
QRS DURATION, ECG06: 88 MS
QTC CALCULATION (BEZET), ECG08: 443 MS
RBC # BLD AUTO: 3.5 M/UL (ref 4.2–5.3)
SODIUM SERPL-SCNC: 141 MMOL/L (ref 136–145)
TROPONIN I SERPL-MCNC: <0.02 NG/ML (ref 0–0.04)
URATE SERPL-MCNC: 6.2 MG/DL (ref 2.6–7.2)
VENTRICULAR RATE, ECG03: 114 BPM
WBC # BLD AUTO: 10.5 K/UL (ref 4.6–13.2)

## 2019-02-28 PROCEDURE — 65270000029 HC RM PRIVATE

## 2019-02-28 PROCEDURE — 85025 COMPLETE CBC W/AUTO DIFF WBC: CPT

## 2019-02-28 PROCEDURE — 80053 COMPREHEN METABOLIC PANEL: CPT

## 2019-02-28 PROCEDURE — 99285 EMERGENCY DEPT VISIT HI MDM: CPT

## 2019-02-28 PROCEDURE — 96374 THER/PROPH/DIAG INJ IV PUSH: CPT

## 2019-02-28 PROCEDURE — 84550 ASSAY OF BLOOD/URIC ACID: CPT

## 2019-02-28 PROCEDURE — 93971 EXTREMITY STUDY: CPT

## 2019-02-28 PROCEDURE — 94761 N-INVAS EAR/PLS OXIMETRY MLT: CPT

## 2019-02-28 PROCEDURE — 74011000250 HC RX REV CODE- 250: Performed by: EMERGENCY MEDICINE

## 2019-02-28 PROCEDURE — 84484 ASSAY OF TROPONIN QUANT: CPT

## 2019-02-28 PROCEDURE — 71045 X-RAY EXAM CHEST 1 VIEW: CPT

## 2019-02-28 PROCEDURE — 74011250637 HC RX REV CODE- 250/637: Performed by: EMERGENCY MEDICINE

## 2019-02-28 PROCEDURE — 93005 ELECTROCARDIOGRAM TRACING: CPT

## 2019-02-28 PROCEDURE — A9567 TECHNETIUM TC-99M AEROSOL: HCPCS

## 2019-02-28 RX ORDER — ENOXAPARIN SODIUM 100 MG/ML
30 INJECTION SUBCUTANEOUS EVERY 24 HOURS
Status: DISCONTINUED | OUTPATIENT
Start: 2019-03-01 | End: 2019-03-01

## 2019-02-28 RX ORDER — DILTIAZEM HYDROCHLORIDE 5 MG/ML
20 INJECTION INTRAVENOUS
Status: COMPLETED | OUTPATIENT
Start: 2019-02-28 | End: 2019-02-28

## 2019-02-28 RX ORDER — OXYCODONE AND ACETAMINOPHEN 5; 325 MG/1; MG/1
1 TABLET ORAL
Status: COMPLETED | OUTPATIENT
Start: 2019-02-28 | End: 2019-02-28

## 2019-02-28 RX ADMIN — OXYCODONE AND ACETAMINOPHEN 1 TABLET: 5; 325 TABLET ORAL at 16:00

## 2019-02-28 RX ADMIN — DILTIAZEM HYDROCHLORIDE 5 MG: 5 INJECTION INTRAVENOUS at 17:13

## 2019-02-28 NOTE — ED PROVIDER NOTES
EMERGENCY DEPARTMENT HISTORY AND PHYSICAL EXAM    3:39 PM      Date: 2/28/2019  Patient Name: Nerissa Quinteros    History of Presenting Illness     Chief Complaint   Patient presents with    Generalized Body Aches         History Provided By: Patient    Additional History (Context): Nerissa Quinteros is a [de-identified] y.o. female with diabetes and hypertension who presents to the ED with c/o SOB today. Also reports feeling lightheadedness and describes \"a weird feeling in my chest.\" Denies similar symptoms in the past.  Also describes nausea, vomiting, diarrhea and R foot pain and swelling. Denies history of A-Fib. No modifying or aggravating factors were reported. No other symptoms or concerns were expressed. States she does not have a cardiologist.      PCP: Elizabeth Kincaid MD    Chief Complaint: SOB  Duration:  Today  Timing:  Acute  Location: Generalized  Quality: Not reported  Severity: Not described  Modifying Factors: No modifying or aggravating factors were reported. Associated Symptoms: lightheadedness, chest pain, R foot pain and swelling, nausea, vomiting, diarrhea.       Past History     Past Medical History:  Past Medical History:   Diagnosis Date    Acid reflux     Cancer ([address]     uterine    Diabetes ([address]     Dizzy spells     Essential hypertension     Gallbladder attack     Hearing loss     High cholesterol     Hypertension     Memory loss     Sinus complaint     SOB (shortness of breath)     Thyroid disease        Past Surgical History:  Past Surgical History:   Procedure Laterality Date    HX APPENDECTOMY      HX CATARACT REMOVAL      HX CHOLECYSTECTOMY      HX HYSTERECTOMY      partial hysterectomy    HX ORTHOPAEDIC      5 back surgeries    HX ORTHOPAEDIC      hand arm and left knee    NEUROLOGICAL PROCEDURE UNLISTED  1993    Compression Fracture Surgery    NEUROLOGICAL PROCEDURE UNLISTED  1994    NEUROLOGICAL PROCEDURE UNLISTED  1995       Family History:  Family History Problem Relation Age of Onset    Cancer Mother 76        Cervical Cancer    Asthma Father     Heart Attack Father     Cancer Maternal Grandmother         colon cancer    Diabetes Maternal Grandmother     Hypertension Maternal Grandmother     Breast Cancer Daughter         two daughters    Diabetes Paternal Grandmother     Heart Disease Paternal Grandmother     Other Maternal Aunt         mental illness due to accident during infancy       Social History:  Social History     Tobacco Use    Smoking status: Never Smoker    Smokeless tobacco: Never Used   Substance Use Topics    Alcohol use: No     Alcohol/week: 0.0 oz    Drug use: No       Allergies: Allergies   Allergen Reactions    Codeine Swelling    Codeine Other (comments)     Severe swelling    Feldene [Piroxicam] Swelling     Severe swelling of hands, face and feet. Review of Systems       Review of Systems   Constitutional: Negative for chills and fever. HENT: Negative for congestion, rhinorrhea, sore throat and trouble swallowing. Eyes: Positive for photophobia. Negative for visual disturbance. Respiratory: Positive for shortness of breath. Negative for cough and wheezing. Cardiovascular: Positive for chest pain. Gastrointestinal: Positive for diarrhea, nausea and vomiting. Negative for abdominal pain. Endocrine: Negative for polyuria. Genitourinary: Negative for dysuria. Musculoskeletal: Positive for arthralgias (R foot). Negative for neck stiffness. Skin: Negative for pallor and rash. Neurological: Positive for light-headedness. Negative for dizziness, weakness, numbness and headaches. Hematological: Does not bruise/bleed easily. Psychiatric/Behavioral: Negative for confusion and dysphoric mood. All other systems reviewed and are negative.         Physical Exam     Visit Vitals  /59   Pulse 73   Temp 98.1 °F (36.7 °C)   Resp 13   Ht 5' (1.524 m)   Wt 68.5 kg (151 lb)   SpO2 99%   BMI 29.49 kg/m² Physical Exam   Constitutional: She is oriented to person, place, and time. She appears well-developed and well-nourished. No distress. HENT:   Head: Normocephalic and atraumatic. Mouth/Throat: Oropharynx is clear and moist.   Eyes: Conjunctivae are normal. Pupils are equal, round, and reactive to light. No scleral icterus. Neck: Normal range of motion. Neck supple. Cardiovascular: Intact distal pulses. An irregularly irregular rhythm present. Tachycardia present. Capillary refill < 3 seconds  Symmetric DP pulses   Pulmonary/Chest: Effort normal and breath sounds normal. No respiratory distress. She has no wheezes. O2 100% on room air. Respirations 17. Abdominal: Soft. Bowel sounds are normal. She exhibits no distension. There is no tenderness. Musculoskeletal:   Swelling in RLE with calf tenderness  No thigh tenderness   Lymphadenopathy:     She has no cervical adenopathy. Neurological: She is alert and oriented to person, place, and time. No cranial nerve deficit. Skin: Skin is warm and dry. She is not diaphoretic. Nursing note and vitals reviewed.         Diagnostic Study Results     Labs -  Recent Results (from the past 12 hour(s))   EKG, 12 LEAD, INITIAL    Collection Time: 02/28/19  3:19 PM   Result Value Ref Range    Ventricular Rate 114 BPM    Atrial Rate 117 BPM    QRS Duration 88 ms    Q-T Interval 322 ms    QTC Calculation (Bezet) 443 ms    Calculated R Axis 27 degrees    Calculated T Axis -14 degrees    Diagnosis       Atrial fibrillation with rapid ventricular response  Nonspecific ST and T wave abnormality , probably digitalis effect  Abnormal ECG  No previous ECGs available  Confirmed by Andre Gary MD, Favian Valera ([id number]) on 2/28/2019 4:18:46 PM     CBC WITH AUTOMATED DIFF    Collection Time: 02/28/19  4:43 PM   Result Value Ref Range    WBC 10.5 4.6 - 13.2 K/uL    RBC 3.50 (L) 4.20 - 5.30 M/uL    HGB 9.9 (L) 12.0 - 16.0 g/dL    HCT 29.3 (L) 35.0 - 45.0 %    MCV 83.7 74.0 - 97.0 FL    MCH 28.3 24.0 - 34.0 PG    MCHC 33.8 31.0 - 37.0 g/dL    RDW 14.1 11.6 - 14.5 %    PLATELET 985 219 - 986 K/uL    MPV 9.5 9.2 - 11.8 FL    NEUTROPHILS 76 (H) 40 - 73 %    LYMPHOCYTES 14 (L) 21 - 52 %    MONOCYTES 9 3 - 10 %    EOSINOPHILS 1 0 - 5 %    BASOPHILS 0 0 - 2 %    ABS. NEUTROPHILS 7.8 1.8 - 8.0 K/UL    ABS. LYMPHOCYTES 1.5 0.9 - 3.6 K/UL    ABS. MONOCYTES 1.0 0.05 - 1.2 K/UL    ABS. EOSINOPHILS 0.1 0.0 - 0.4 K/UL    ABS. BASOPHILS 0.0 0.0 - 0.1 K/UL    DF AUTOMATED     METABOLIC PANEL, COMPREHENSIVE    Collection Time: 02/28/19  4:43 PM   Result Value Ref Range    Sodium 141 136 - 145 mmol/L    Potassium 3.7 3.5 - 5.5 mmol/L    Chloride 106 100 - 108 mmol/L    CO2 24 21 - 32 mmol/L    Anion gap 11 3.0 - 18 mmol/L    Glucose 139 (H) 74 - 99 mg/dL    BUN 12 7.0 - 18 MG/DL    Creatinine 1.00 0.6 - 1.3 MG/DL    BUN/Creatinine ratio 12 12 - 20      GFR est AA >60 >60 ml/min/1.73m2    GFR est non-AA 53 (L) >60 ml/min/1.73m2    Calcium 9.3 8.5 - 10.1 MG/DL    Bilirubin, total 0.6 0.2 - 1.0 MG/DL    ALT (SGPT) 13 13 - 56 U/L    AST (SGOT) 12 (L) 15 - 37 U/L    Alk. phosphatase 67 45 - 117 U/L    Protein, total 6.6 6.4 - 8.2 g/dL    Albumin 3.5 3.4 - 5.0 g/dL    Globulin 3.1 2.0 - 4.0 g/dL    A-G Ratio 1.1 0.8 - 1.7         Radiologic Studies -   NM LUNG PERFUSION W VENT   Final Result   IMPRESSION:      Nondiagnostic VQ scan due to inadequate distribution on perfusion images into   the lung parenchyma and tracer pooling at the site of injection in the left   neck. Recommend clinical correlation for functioning of the left neck catheter. This could represent extravasated radiotracer. XR CHEST PORT   Final Result   Impression:     No radiographic evidence of acute cardiopulmonary process. CXR: my prelim read, No acute process      Medical Decision Making   I am the first provider for this patient.     I reviewed the vital signs, available nursing notes, past medical history, past surgical history, family history and social history. Vital Signs-Reviewed the patient's vital signs. Pulse Oximetry Analysis -  100% on room air, stable    Cardiac Monitor:  Rate: 115  Rhythm:  Sinus Tachycardia     EKG: Interpreted by the EP. Time Interpreted: 15:21   Rate: 114   Rhythm: Atrial Fibrillation    Interpretation: Normal QRS. Normal QTC. Normal axis. No ST elevation. No T wave inversion. Mild artifact. Records Reviewed: Nursing Notes and Old Medical Records (Time of Review: 3:39 PM)    Provider Notes (Medical Decision Making):  MDM  Number of Diagnoses or Management Options  Atrial fibrillation with RVR [facility]):   Diagnosis management comments: DDx: New onset rapid A-Fib, other arrhythmia, metabolic, PE, DVT. Will check labs, EKG, CXR, duplex RLE       Amount and/or Complexity of Data Reviewed  Clinical lab tests: ordered and reviewed  Tests in the radiology section of CPT®: ordered and reviewed  Tests in the medicine section of CPT®: ordered and reviewed  Obtain history from someone other than the patient: yes  Review and summarize past medical records: yes  Discuss the patient with other providers: yes  Independent visualization of images, tracings, or specimens: yes        Medications   dilTIAZem (CARDIZEM) injection 20 mg (5 mg IntraVENous Given 2/28/19 1713)   oxyCODONE-acetaminophen (PERCOCET) 5-325 mg per tablet 1 Tab (1 Tab Oral Given 2/28/19 1600)   technetium albumin aggregated (MAA) solution 3 millicurie (5.1 millicuries IntraVENous Given 2/28/19 2007)   technetium pentetate (Tc-DTPA) injection 35 millicurie (78.2 millicuries Inhalation Given 2/28/19 2007)            Lisa Moore, DO    6:05 PM      ED Course: Progress Notes, Reevaluation, and Consults:    4:44 PM: Patient states she is extremely difficult to get IV's on, states her veins roll around and usually have to do an EJ on her as they cannot get access in her arms.  Multiple stabs, have attempted to obtain IV access in arms, hands, keeps blowing. I attempted EJ on the R side, it blew but I did place one on the L EJ. Will obtain VQ scan to evaluate for PE.    6:03 PM  Pt received 5 mg of Cardizem. While giving the Cardizem, the IV blew. Heart rate did conver. After 5 mg of Cardizem, the pt's heart rate decreased to the mid 80s and now SOB resolved. Denies any cp or dizziness. VQ scan nondiagnostic, but no DVT. Pt sx's have resolved. Likely due to the rapid afib. Her O2 99%RA. dont feel need CTA chest.    Consult:  Discussed care with Dr. Ludy Solis, Specialty: PFM attending  Standard discussion; including history of patients chief complaint, available diagnostic results, and treatment course. He agrees with admit and agrees no CTA. He wants residents to give pt low dose PO cardizem. 9:57 PM, 2/28/2019       Discussed with PFM resident. Diagnosis     Clinical Impression:   1. Atrial fibrillation with RVR ([address]    2. New onset a-fib [facility])        Disposition: admitted    Follow-up Information    None             Medication List      ASK your doctor about these medications    alendronate 70 mg tablet  Commonly known as:  FOSAMAX  TAKE 1 TABLET EVERY 7 DAYS     amLODIPine 5 mg tablet  Commonly known as:  NORVASC  TAKE 1 TABLET DAILY     furosemide 40 mg tablet  Commonly known as:  LASIX  Take 1 Tab by mouth daily. Takes 40 mg every morning     gabapentin 300 mg capsule  Commonly known as:  NEURONTIN  TAKE 1 CAPSULE NIGHTLY     JANUVIA 50 mg tablet  Generic drug:  SITagliptin     lancets Misc  Precision X-tra. Check 1-2 times a day     levoFLOXacin 750 mg tablet  Commonly known as:  LEVAQUIN  Take 1 Tab by mouth every fourty-eight (48) hours.      levothyroxine 137 mcg tablet  Commonly known as:  SYNTHROID     metFORMIN 1,000 mg tablet  Commonly known as:  GLUCOPHAGE  TAKE 1 TABLET TWICE A DAY WITH MEALS     pramipexole 0.5 mg tablet  Commonly known as:  MIRAPEX  TAKE 1 TABLET THREE TIMES A DAY     PRECISION XTRA TEST strip  Generic drug:  glucose blood VI test strips  USE TO CHECK 1 TO 2 TIMES DAILY     predniSONE 20 mg tablet  Commonly known as:  DELTASONE     raNITIdine 150 mg tablet  Commonly known as:  ZANTAC  Take 1 Tab by mouth two (2) times a day. simvastatin 20 mg tablet  Commonly known as:  ZOCOR  TAKE 1 TABLET NIGHTLY     tiZANidine 4 mg capsule  Commonly known as:  ZANAFLEX     valsartan 160 mg tablet  Commonly known as:  DIOVAN  TAKE 1 TABLET DAILY          _______________________________    Attestations:  Scribe Attestation     Macarena Dickinson/Roland Carver acting as a scribe for and in the presence of Anuj Regalado DO      February 28, 2019 at 3:48 PM       Provider Attestation:      I personally performed the services described in the documentation, reviewed the documentation, as recorded by the scribe in my presence, and it accurately and completely records my words and actions.  February 28, 2019 at 3:48 PM - Anuj Regalado DO    _______________________________

## 2019-03-01 ENCOUNTER — APPOINTMENT (OUTPATIENT)
Dept: NON INVASIVE DIAGNOSTICS | Age: 79
DRG: 310 | End: 2019-03-01
Attending: STUDENT IN AN ORGANIZED HEALTH CARE EDUCATION/TRAINING PROGRAM
Payer: MEDICARE

## 2019-03-01 VITALS
OXYGEN SATURATION: 97 % | BODY MASS INDEX: 29.64 KG/M2 | WEIGHT: 151 LBS | HEART RATE: 72 BPM | RESPIRATION RATE: 20 BRPM | DIASTOLIC BLOOD PRESSURE: 63 MMHG | SYSTOLIC BLOOD PRESSURE: 138 MMHG | HEIGHT: 60 IN | TEMPERATURE: 97.7 F

## 2019-03-01 LAB
ANION GAP SERPL CALC-SCNC: 13 MMOL/L (ref 3–18)
APTT PPP: 36.4 SEC (ref 23–36.4)
BASOPHILS # BLD: 0 K/UL (ref 0–0.1)
BASOPHILS NFR BLD: 0 % (ref 0–2)
BUN SERPL-MCNC: 12 MG/DL (ref 7–18)
BUN/CREAT SERPL: 12 (ref 12–20)
CALCIUM SERPL-MCNC: 9.3 MG/DL (ref 8.5–10.1)
CHLORIDE SERPL-SCNC: 106 MMOL/L (ref 100–108)
CK MB CFR SERPL CALC: 4.1 % (ref 0–4)
CK MB SERPL-MCNC: 1.9 NG/ML (ref 5–25)
CK SERPL-CCNC: 46 U/L (ref 26–192)
CO2 SERPL-SCNC: 23 MMOL/L (ref 21–32)
CREAT SERPL-MCNC: 1.02 MG/DL (ref 0.6–1.3)
DIFFERENTIAL METHOD BLD: ABNORMAL
ECHO AO ROOT DIAM: 3.25 CM
ECHO AV AREA PEAK VELOCITY: 2 CM2
ECHO AV AREA VTI: 2.2 CM2
ECHO AV AREA/BSA PEAK VELOCITY: 1.2 CM2/M2
ECHO AV AREA/BSA VTI: 1.3 CM2/M2
ECHO AV MEAN GRADIENT: 5 MMHG
ECHO AV PEAK GRADIENT: 9.3 MMHG
ECHO AV PEAK VELOCITY: 152.47 CM/S
ECHO AV VTI: 30.85 CM
ECHO IVC SNIFF: 1.58 CM
ECHO LA AREA 4C: 25.1 CM2
ECHO LA VOL 2C: 59.37 ML (ref 22–52)
ECHO LA VOL 4C: 75.63 ML (ref 22–52)
ECHO LA VOL BP: 73.19 ML (ref 22–52)
ECHO LA VOL/BSA BIPLANE: 44.18 ML/M2 (ref 16–28)
ECHO LA VOLUME INDEX A2C: 35.84 ML/M2 (ref 16–28)
ECHO LA VOLUME INDEX A4C: 45.66 ML/M2 (ref 16–28)
ECHO LV INTERNAL DIMENSION DIASTOLIC: 4.37 CM (ref 3.9–5.3)
ECHO LV INTERNAL DIMENSION SYSTOLIC: 2.92 CM
ECHO LV IVSD: 1.13 CM (ref 0.6–0.9)
ECHO LV MASS 2D: 203.6 G (ref 67–162)
ECHO LV MASS INDEX 2D: 122.9 G/M2 (ref 43–95)
ECHO LV POSTERIOR WALL DIASTOLIC: 1.13 CM (ref 0.6–0.9)
ECHO LVOT DIAM: 1.95 CM
ECHO LVOT PEAK GRADIENT: 4.3 MMHG
ECHO LVOT PEAK VELOCITY: 103.88 CM/S
ECHO LVOT VTI: 23.02 CM
ECHO MV A VELOCITY: 113.1 CM/S
ECHO MV AREA PHT: 2.5 CM2
ECHO MV AREA VTI: 1.5 CM2
ECHO MV E DECELERATION TIME (DT): 285.1 MS
ECHO MV E VELOCITY: 1.02 CM/S
ECHO MV E/A RATIO: 0.01
ECHO MV MAX VELOCITY: 131.22 CM/S
ECHO MV MEAN GRADIENT: 3 MMHG
ECHO MV PEAK GRADIENT: 6.9 MMHG
ECHO MV PRESSURE HALF TIME (PHT): 88.7 MS
ECHO MV VTI: 46.78 CM
ECHO PULMONARY ARTERY SYSTOLIC PRESSURE (PASP): 26 MMHG
ECHO PVEIN A VELOCITY: 0.27 CM/S
ECHO PVEIN PEAK D VELOCITY: 0.06 CM/S
ECHO PVEIN S/D RATIO: 395.9
ECHO TV REGURGITANT MAX VELOCITY: 238.59 CM/S
ECHO TV REGURGITANT PEAK GRADIENT: 22.8 MMHG
EOSINOPHIL # BLD: 0.2 K/UL (ref 0–0.4)
EOSINOPHIL NFR BLD: 2 % (ref 0–5)
ERYTHROCYTE [DISTWIDTH] IN BLOOD BY AUTOMATED COUNT: 13.9 % (ref 11.6–14.5)
GLUCOSE SERPL-MCNC: 145 MG/DL (ref 74–99)
HCT VFR BLD AUTO: 27.7 % (ref 35–45)
HGB BLD-MCNC: 9.4 G/DL (ref 12–16)
INR PPP: 1 (ref 0.8–1.2)
LYMPHOCYTES # BLD: 1.7 K/UL (ref 0.9–3.6)
LYMPHOCYTES NFR BLD: 19 % (ref 21–52)
MAGNESIUM SERPL-MCNC: 1 MG/DL (ref 1.6–2.6)
MAGNESIUM SERPL-MCNC: 1.2 MG/DL (ref 1.6–2.6)
MAGNESIUM SERPL-MCNC: 2.3 MG/DL (ref 1.6–2.6)
MCH RBC QN AUTO: 28.3 PG (ref 24–34)
MCHC RBC AUTO-ENTMCNC: 33.9 G/DL (ref 31–37)
MCV RBC AUTO: 83.4 FL (ref 74–97)
MONOCYTES # BLD: 1 K/UL (ref 0.05–1.2)
MONOCYTES NFR BLD: 11 % (ref 3–10)
NEUTS SEG # BLD: 5.9 K/UL (ref 1.8–8)
NEUTS SEG NFR BLD: 68 % (ref 40–73)
PLATELET # BLD AUTO: 187 K/UL (ref 135–420)
PMV BLD AUTO: 9.1 FL (ref 9.2–11.8)
POTASSIUM SERPL-SCNC: 3.9 MMOL/L (ref 3.5–5.5)
PROTHROMBIN TIME: 13.1 SEC (ref 11.5–15.2)
RBC # BLD AUTO: 3.32 M/UL (ref 4.2–5.3)
SODIUM SERPL-SCNC: 142 MMOL/L (ref 136–145)
TROPONIN I SERPL-MCNC: <0.02 NG/ML (ref 0–0.04)
TSH SERPL DL<=0.05 MIU/L-ACNC: 0.6 UIU/ML (ref 0.36–3.74)
WBC # BLD AUTO: 8.8 K/UL (ref 4.6–13.2)

## 2019-03-01 PROCEDURE — 97165 OT EVAL LOW COMPLEX 30 MIN: CPT

## 2019-03-01 PROCEDURE — 74011250636 HC RX REV CODE- 250/636: Performed by: STUDENT IN AN ORGANIZED HEALTH CARE EDUCATION/TRAINING PROGRAM

## 2019-03-01 PROCEDURE — 85610 PROTHROMBIN TIME: CPT

## 2019-03-01 PROCEDURE — 85730 THROMBOPLASTIN TIME PARTIAL: CPT

## 2019-03-01 PROCEDURE — 83735 ASSAY OF MAGNESIUM: CPT

## 2019-03-01 PROCEDURE — 74011250637 HC RX REV CODE- 250/637: Performed by: STUDENT IN AN ORGANIZED HEALTH CARE EDUCATION/TRAINING PROGRAM

## 2019-03-01 PROCEDURE — 93306 TTE W/DOPPLER COMPLETE: CPT

## 2019-03-01 PROCEDURE — 36415 COLL VENOUS BLD VENIPUNCTURE: CPT

## 2019-03-01 PROCEDURE — 74011636637 HC RX REV CODE- 636/637: Performed by: STUDENT IN AN ORGANIZED HEALTH CARE EDUCATION/TRAINING PROGRAM

## 2019-03-01 PROCEDURE — 84443 ASSAY THYROID STIM HORMONE: CPT

## 2019-03-01 PROCEDURE — 82550 ASSAY OF CK (CPK): CPT

## 2019-03-01 PROCEDURE — 85025 COMPLETE CBC W/AUTO DIFF WBC: CPT

## 2019-03-01 PROCEDURE — 80048 BASIC METABOLIC PNL TOTAL CA: CPT

## 2019-03-01 RX ORDER — LANOLIN ALCOHOL/MO/W.PET/CERES
400 CREAM (GRAM) TOPICAL 2 TIMES DAILY
Qty: 60 TAB | Refills: 1 | Status: SHIPPED | OUTPATIENT
Start: 2019-03-01

## 2019-03-01 RX ORDER — MAGNESIUM SULFATE HEPTAHYDRATE 40 MG/ML
2 INJECTION, SOLUTION INTRAVENOUS ONCE
Status: DISCONTINUED | OUTPATIENT
Start: 2019-03-01 | End: 2019-03-01

## 2019-03-01 RX ORDER — DILTIAZEM HYDROCHLORIDE 120 MG/1
120 CAPSULE, COATED, EXTENDED RELEASE ORAL DAILY
Status: DISCONTINUED | OUTPATIENT
Start: 2019-03-01 | End: 2019-03-01 | Stop reason: HOSPADM

## 2019-03-01 RX ORDER — ASPIRIN 81 MG/1
81 TABLET ORAL DAILY
Status: DISCONTINUED | OUTPATIENT
Start: 2019-03-01 | End: 2019-03-01

## 2019-03-01 RX ORDER — LEVOTHYROXINE SODIUM 125 UG/1
125 TABLET ORAL EVERY OTHER DAY
Status: DISCONTINUED | OUTPATIENT
Start: 2019-03-01 | End: 2019-03-01 | Stop reason: HOSPADM

## 2019-03-01 RX ORDER — GABAPENTIN 300 MG/1
300 CAPSULE ORAL DAILY
Status: DISCONTINUED | OUTPATIENT
Start: 2019-03-01 | End: 2019-03-01 | Stop reason: HOSPADM

## 2019-03-01 RX ORDER — MAGNESIUM SULFATE HEPTAHYDRATE 40 MG/ML
4 INJECTION, SOLUTION INTRAVENOUS ONCE
Status: DISCONTINUED | OUTPATIENT
Start: 2019-03-01 | End: 2019-03-01

## 2019-03-01 RX ORDER — LANOLIN ALCOHOL/MO/W.PET/CERES
400 CREAM (GRAM) TOPICAL 2 TIMES DAILY
Status: DISCONTINUED | OUTPATIENT
Start: 2019-03-01 | End: 2019-03-01 | Stop reason: HOSPADM

## 2019-03-01 RX ORDER — MAGNESIUM SULFATE HEPTAHYDRATE 40 MG/ML
4 INJECTION, SOLUTION INTRAVENOUS ONCE
Status: COMPLETED | OUTPATIENT
Start: 2019-03-01 | End: 2019-03-01

## 2019-03-01 RX ORDER — MAGNESIUM SULFATE HEPTAHYDRATE 40 MG/ML
2 INJECTION, SOLUTION INTRAVENOUS
Status: DISPENSED | OUTPATIENT
Start: 2019-03-01 | End: 2019-03-01

## 2019-03-01 RX ORDER — ASPIRIN 81 MG/1
TABLET ORAL DAILY
COMMUNITY
End: 2019-03-01

## 2019-03-01 RX ORDER — AMLODIPINE BESYLATE 5 MG/1
5 TABLET ORAL DAILY
Status: DISCONTINUED | OUTPATIENT
Start: 2019-03-01 | End: 2019-03-01

## 2019-03-01 RX ORDER — PREDNISONE 20 MG/1
20 TABLET ORAL
Status: DISCONTINUED | OUTPATIENT
Start: 2019-03-01 | End: 2019-03-01 | Stop reason: HOSPADM

## 2019-03-01 RX ORDER — ATORVASTATIN CALCIUM 10 MG/1
10 TABLET, FILM COATED ORAL DAILY
Status: DISCONTINUED | OUTPATIENT
Start: 2019-03-01 | End: 2019-03-01 | Stop reason: HOSPADM

## 2019-03-01 RX ORDER — FAMOTIDINE 20 MG/1
20 TABLET, FILM COATED ORAL DAILY
Status: DISCONTINUED | OUTPATIENT
Start: 2019-03-01 | End: 2019-03-01 | Stop reason: HOSPADM

## 2019-03-01 RX ORDER — LEVOTHYROXINE SODIUM 125 UG/1
125 TABLET ORAL EVERY OTHER DAY
COMMUNITY

## 2019-03-01 RX ORDER — DILTIAZEM HYDROCHLORIDE 120 MG/1
120 CAPSULE, COATED, EXTENDED RELEASE ORAL DAILY
Qty: 30 CAP | Refills: 1 | Status: SHIPPED | OUTPATIENT
Start: 2019-03-02

## 2019-03-01 RX ORDER — ONDANSETRON 4 MG/1
4 TABLET, FILM COATED ORAL
Status: DISCONTINUED | OUTPATIENT
Start: 2019-03-01 | End: 2019-03-01 | Stop reason: HOSPADM

## 2019-03-01 RX ORDER — VALSARTAN 160 MG/1
160 TABLET ORAL DAILY
Status: DISCONTINUED | OUTPATIENT
Start: 2019-03-01 | End: 2019-03-01 | Stop reason: HOSPADM

## 2019-03-01 RX ORDER — PRAMIPEXOLE DIHYDROCHLORIDE 0.5 MG/1
0.5 TABLET ORAL 3 TIMES DAILY
Status: DISCONTINUED | OUTPATIENT
Start: 2019-03-01 | End: 2019-03-01 | Stop reason: HOSPADM

## 2019-03-01 RX ORDER — FUROSEMIDE 40 MG/1
40 TABLET ORAL
Status: DISCONTINUED | OUTPATIENT
Start: 2019-03-01 | End: 2019-03-01 | Stop reason: HOSPADM

## 2019-03-01 RX ORDER — SIMVASTATIN 20 MG/1
TABLET, FILM COATED ORAL
Qty: 90 TAB | Refills: 0 | Status: SHIPPED | OUTPATIENT
Start: 2019-03-01

## 2019-03-01 RX ORDER — TIZANIDINE 4 MG/1
4 TABLET ORAL
Status: DISCONTINUED | OUTPATIENT
Start: 2019-03-01 | End: 2019-03-01 | Stop reason: HOSPADM

## 2019-03-01 RX ORDER — SODIUM CHLORIDE 9 MG/ML
100 INJECTION, SOLUTION INTRAVENOUS CONTINUOUS
Status: DISCONTINUED | OUTPATIENT
Start: 2019-03-01 | End: 2019-03-01

## 2019-03-01 RX ADMIN — MAGNESIUM OXIDE TAB 400 MG (241.3 MG ELEMENTAL MG) 400 MG: 400 (241.3 MG) TAB at 18:19

## 2019-03-01 RX ADMIN — SODIUM CHLORIDE 100 ML/HR: 900 INJECTION, SOLUTION INTRAVENOUS at 09:13

## 2019-03-01 RX ADMIN — DILTIAZEM HYDROCHLORIDE 120 MG: 120 CAPSULE, COATED, EXTENDED RELEASE ORAL at 09:16

## 2019-03-01 RX ADMIN — PRAMIPEXOLE DIHYDROCHLORIDE 0.5 MG: 0.5 TABLET ORAL at 09:16

## 2019-03-01 RX ADMIN — ATORVASTATIN CALCIUM 10 MG: 10 TABLET, FILM COATED ORAL at 09:16

## 2019-03-01 RX ADMIN — ASPIRIN 81 MG: 81 TABLET, COATED ORAL at 09:16

## 2019-03-01 RX ADMIN — VALSARTAN 160 MG: 160 TABLET ORAL at 09:16

## 2019-03-01 RX ADMIN — MAGNESIUM SULFATE HEPTAHYDRATE 4 G: 40 INJECTION, SOLUTION INTRAVENOUS at 14:37

## 2019-03-01 RX ADMIN — ENOXAPARIN SODIUM 30 MG: 30 INJECTION SUBCUTANEOUS at 00:48

## 2019-03-01 RX ADMIN — APIXABAN 5 MG: 5 TABLET, FILM COATED ORAL at 18:18

## 2019-03-01 RX ADMIN — TIZANIDINE 4 MG: 4 TABLET ORAL at 05:47

## 2019-03-01 RX ADMIN — PREDNISONE 20 MG: 20 TABLET ORAL at 04:15

## 2019-03-01 RX ADMIN — FAMOTIDINE 20 MG: 20 TABLET ORAL at 09:16

## 2019-03-01 RX ADMIN — LEVOTHYROXINE SODIUM 125 MCG: 125 TABLET ORAL at 05:47

## 2019-03-01 RX ADMIN — GABAPENTIN 300 MG: 300 CAPSULE ORAL at 09:16

## 2019-03-01 RX ADMIN — PRAMIPEXOLE DIHYDROCHLORIDE 0.5 MG: 0.5 TABLET ORAL at 18:18

## 2019-03-01 NOTE — DISCHARGE SUMMARY
[address] Medicine  Discharge Summary      Patient: Shira [facility] MRN: [id number]  CSN: [id number]    YOB: 1940  Age: [de-identified] y.o. Sex: female      Admission Date: 2/28/2019 Discharge Date: 03/01/2019   Attending: Ace Valenzuela MD PCP: Nahid Lau MD     ================================================================    Reason for Admission: Atrial fibrillation with RVR ([facility] [address] [I48.91]  New onset a-fib [facility]) [I48.91]    Discharge Diagnoses:  Paroxysmal atrial fibrillation  NIDDM2 w/neuropathy  Hypomagnesia   H/O HTN/HLD  H/O hypothyroidism  H/O GERD  Gout arthropathy  Nausea, resolved  Diarrhea, resolved    Important notes to PCP/ follow-up studies and evaluations   - Patient started on cardizem  mg daily for PAF ([id number]) and apixaban 5 mg po BID for stroke prophylaxis  - ECHO results showed preserved EF, mild L ventricular diastolic dysfunction, mild tricuspid valve regurg, mild aortic valve sclerosis  - Aspirin discontinued  - Required IV mag, Magnesium supplement reinitiated at 400 mg po twice daily; please recheck a mag level  - Current gout flare RLE    Pending labs and studies:  None    Operative Procedures:   None    Discharge Medications:     Current Discharge Medication List      START taking these medications    Details   apixaban (ELIQUIS) 5 mg tablet Take 1 Tab by mouth two (2) times a day. Qty: 60 Tab, Refills: 1      dilTIAZem CD (CARDIZEM CD) 120 mg ER capsule Take 1 Cap by mouth daily. Qty: 30 Cap, Refills: 1      magnesium oxide (MAG-OX) 400 mg tablet Take 1 Tab by mouth two (2) times a day. Qty: 60 Tab, Refills: 1         CONTINUE these medications which have CHANGED    Details   simvastatin (ZOCOR) 20 mg tablet TAKE 1 TABLET NIGHTLY  Qty: 90 Tab, Refills: 0         CONTINUE these medications which have NOT CHANGED    Details   !! levothyroxine (SYNTHROID) 125 mcg tablet Take 125 mcg by mouth every other day.       furosemide (LASIX) 40 mg tablet Take 1 Tab by mouth daily. Takes 40 mg every morning  Qty: 30 Tab, Refills: 0    Associated Diagnoses: Leg swelling      !! levothyroxine (SYNTHROID) 137 mcg tablet Take 137 mcg by mouth Daily (before breakfast). Take one tablet every other day, alternating with levothyroxine 125mcg      SITagliptin (JANUVIA) 50 mg tablet Take 50 mg by mouth daily. predniSONE (DELTASONE) 20 mg tablet Take 20 mg by mouth daily as needed for Other (for gout flares). valsartan (DIOVAN) 160 mg tablet TAKE 1 TABLET DAILY  Qty: 90 Tab, Refills: 0    Associated Diagnoses: Essential hypertension with goal blood pressure less than 130/80      amLODIPine (NORVASC) 5 mg tablet TAKE 1 TABLET DAILY  Qty: 90 Tab, Refills: 1    Associated Diagnoses: Essential hypertension with goal blood pressure less than 130/80      metFORMIN (GLUCOPHAGE) 1,000 mg tablet TAKE 1 TABLET TWICE A DAY WITH MEALS  Qty: 180 Tab, Refills: 0    Associated Diagnoses: Diabetes mellitus type 2, controlled ([facility])      alendronate (FOSAMAX) 70 mg tablet TAKE 1 TABLET EVERY 7 DAYS  Qty: 12 Tab, Refills: 0    Associated Diagnoses: Facet syndrome ([address] Myofascial pain; H/O osteopenia      gabapentin (NEURONTIN) 300 mg capsule TAKE 1 CAPSULE NIGHTLY  Qty: 30 Cap, Refills: 0      raNITIdine (ZANTAC) 150 mg tablet Take 1 Tab by mouth two (2) times a day. Qty: 180 Tab, Refills: 1    Associated Diagnoses: Gastroesophageal reflux disease without esophagitis      PRECISION XTRA TEST strip USE TO CHECK 1 TO 2 TIMES DAILY  Qty: 100 Strip, Refills: 6    Associated Diagnoses: Diabetes mellitus type 2, controlled ([facility])      pramipexole (MIRAPEX) 0.5 mg tablet TAKE 1 TABLET THREE TIMES A DAY  Qty: 270 Tab, Refills: 0      tiZANidine (ZANAFLEX) 4 mg capsule Take 4 mg by mouth nightly. Lancets misc Precision X-tra. Check 1-2 times a day  Qty: 100 Each, Refills: 6    Associated Diagnoses: Diabetes mellitus type 2, controlled ([address]       ! ! - Potential duplicate medications found. Please discuss with provider. STOP taking these medications       aspirin delayed-release 81 mg tablet Comments:   Reason for Stopping:             Disposition: Home with Home Health    Consultants:    None    Brief Hospital Course (including pertinent history and physical findings)    Patient referred by [facility] nurse who noticed abnormal heart beat. Patient relatively asymptomatic but has had recent episodes of palpitations and SOB. On arrival to [facility] ED for evaluation, patient CBC/CMP/Trops unremarkable aside from hypomagnesemia. Kidney function improved after recent hospitalizations for LOLIS. Patient received IV cardizem 5 mg x1 in ED and converted to NSR. She was started on lovenox for VTE prophylaxis and admitted to a telemetry bed. Paroxysmal atrial fibrillation  Patient had a non-diagnostic V/Q scan and her TSH was wnl. Patient remained in sinus rhythm and was started on cardizem  mg due to her elevated [id number] score (5, 6.7% stroke risk annually). She remained in sinus rhythm throughout the day. A long conversation with the patient was had about systemic anticoagulation and variety of agents available. Discussion included available agents, bleeding risk, reversibility agents, lab testing requirements, dietary restrictions, etc. Patient elected for a NOAC and given kidney history, started on apixaban 5 mg po twice daily. Troponins were negative x 2. Telemetry data showed normal sinus rhythm throughout her hospitalization after IV cardizem delivered in the ED. Nausea, diarrhea  Patient reported several loose bowel movements the day prior to admission. She reported passing a hard stool and then several loose bowel movements afterwards. No blood reported and no recent antibiotics. Patient did not have diarrhea while inpatient and she denied abdominal pain and nausea. Hypomagnesia  Patient reports history of low magnesium for which she previously took mag-ox 400 mg po TID. Patient states she has not taken this medication in over a month. She received approximately (IV infiltrated) 4g mag during this hospitalization. A recheck level went from 1.0>2.3. She was discharged on mag-ox 400 mg po BID and was asked to recheck her mag level at PCP next week. Gout, R leg pain  Patient with recent gout flare and on prednisone therapy. No appreciable calf tenderness or circumferential difference. Negative PVL doppler studies of her RLE. Summarized key findings and results (labs, imaging studies, ECHO, cardiac cath, endoscopies, etc):    Key Findings or tests:   DATE TEST RESULT OR IMPRESSION   02/28/2019 V/Q scan IMPRESSION:  Nondiagnostic VQ scan due to inadequate distribution on perfusion images into the lung parenchyma and tracer pooling at the site of injection in the left neck. Recommend clinical correlation for functioning of the left neck catheter. This could represent extravasated radiotracer. 03/01/2019 EKG Component Value Ref Range & Units Status   Ventricular Rate 114  BPM Final   Atrial Rate 117  BPM Final   QRS Duration 88  ms Final   Q-T Interval 322  ms Final   QTC Calculation (Bezet) 443  ms Final   Calculated R Axis 27  degrees Final   Calculated T Axis -14  degrees Final   Diagnosis   Final   Atrial fibrillation with rapid ventricular response   Nonspecific ST and T wave abnormality , probably digitalis effect   Abnormal ECG   No previous ECGs available   Confirmed by Vikram Eisenberg MD, [facility] ([id number]) on 2/28/2019 4:18:46 PM       02/28/2019 Duplex LE Right No evidence of DVT within the right lower extremity or left common femoral vein   03/01/2019 ECHO · Estimated left ventricular ejection fraction is 61 - 65%. Left ventricular mild concentric hypertrophy. Abnormal left ventricular septal motion. Diastolic flattening of the interventricular septum consistent with right ventricle volume overload. Mild (grade 1) left ventricular diastolic dysfunction.   · Mild tricuspid valve regurgitation is present. There is no evidence of pulmonary hypertension. · Mitral annular calcification. Trace mitral valve regurgitation. · Mild aortic valve sclerosis but no significant stenosis or regurgitation. Chemistry Recent Labs     03/01/19  1633 03/01/19  0943 03/01/19  0014 02/28/19  1643   GLU  --   --  145* 139*   NA  --   --  142 141   K  --   --  3.9 3.7   CL  --   --  106 106   CO2  --   --  23 24   BUN  --   --  12 12   CREA  --   --  1.02 1.00   CA  --   --  9.3 9.3   MG 2.3 1.2* 1.0*  --    AGAP  --   --  13 11   BUCR  --   --  12 12   AP  --   --   --  67   TP  --   --   --  6.6   ALB  --   --   --  3.5   GLOB  --   --   --  3.1   AGRAT  --   --   --  1.1        CBC w/Diff Recent Labs     03/01/19 0014 02/28/19  1643   WBC 8.8 10.5   RBC 3.32* 3.50*   HGB 9.4* 9.9*   HCT 27.7* 29.3*    196   GRANS 68 76*   LYMPH 19* 14*   EOS 2 1        Liver Enzymes Protein, total   Date Value Ref Range Status   02/28/2019 6.6 6.4 - 8.2 g/dL Final     Albumin   Date Value Ref Range Status   02/28/2019 3.5 3.4 - 5.0 g/dL Final     Globulin   Date Value Ref Range Status   02/28/2019 3.1 2.0 - 4.0 g/dL Final     A-G Ratio   Date Value Ref Range Status   02/28/2019 1.1 0.8 - 1.7   Final     AST (SGOT)   Date Value Ref Range Status   02/28/2019 12 (L) 15 - 37 U/L Final     Alk.  phosphatase   Date Value Ref Range Status   02/28/2019 67 45 - 117 U/L Final     Recent Labs     02/28/19  1643   TP 6.6   ALB 3.5   GLOB 3.1   AGRAT 1.1   SGOT 12*   AP 67        Cardiac Enzymes Lab Results   Component Value Date/Time    CPK 46 03/01/2019 09:43 AM    CKMB 1.9 03/01/2019 09:43 AM    CKND1 4.1 (H) 03/01/2019 09:43 AM    TROIQ <0.02 03/01/2019 09:43 AM        Coagulation Recent Labs     03/01/19  0014   PTP 13.1   INR 1.0   APTT 36.4         Thyroid  Lab Results   Component Value Date/Time    TSH 0.60 03/01/2019 12:14 AM          Lipid Panel Lab Results   Component Value Date/Time    Cholesterol, total 169 11/04/2017 10:23 AM    HDL Cholesterol 49 11/04/2017 10:23 AM    LDL, calculated 87.2 11/04/2017 10:23 AM    VLDL, calculated 32.8 11/04/2017 10:23 AM    Triglyceride 164 (H) 11/04/2017 10:23 AM    CHOL/HDL Ratio 3.4 11/04/2017 10:23 AM        Urinalysis Lab Results   Component Value Date/Time    Color YELLOW 02/08/2019 12:40 PM    Appearance CLOUDY 02/08/2019 12:40 PM    Specific gravity 1.017 02/08/2019 12:40 PM    pH (UA) 5.0 02/08/2019 12:40 PM    Protein NEGATIVE  02/08/2019 12:40 PM    Glucose NEGATIVE  02/08/2019 12:40 PM    Ketone TRACE (A) 02/08/2019 12:40 PM    Bilirubin NEGATIVE  02/08/2019 12:40 PM    Urobilinogen 0.2 02/08/2019 12:40 PM    Nitrites NEGATIVE  02/08/2019 12:40 PM    Leukocyte Esterase MODERATE (A) 02/08/2019 12:40 PM    Epithelial cells 1+ 02/08/2019 12:40 PM    Bacteria 2+ (A) 02/08/2019 12:40 PM    WBC 45 to 50 02/08/2019 12:40 PM    RBC NEGATIVE  02/08/2019 12:40 PM        Functional status and cognitive function:    Ambulates without assistance  Status: alert, cooperative, no distress, appears stated age  Condition: STABLE  Disposition: Home with [facility]    Diet: Diabetic    Code status and advanced care plan: Full    Point of Contact  Augustine Rodriguez   Relationship: Son   [phone]     Patient Education:  Patient was educated on the following topics prior to discharge: Apixaban, atrial fibrillation, anticoagulation strategies    Follow-up:   Follow-up Information     Follow up With Specialties Details Why Contact Info    Parag Coronel MD Family Practice Go on 3/8/2019 @2:00 PM for hospital follow up with primary care doctor [facility]  [phone]            ================================================================    Chandni Giron DO, PGY-1   [facility] Medicine   Intern Pager: [phone]   March 1, 2019, 5:01 PM

## 2019-03-01 NOTE — ED NOTES
Patient states that [facility]. Patient states that she has had heart palpitation for the last two days.

## 2019-03-01 NOTE — H&P
Admission History and Physical  [facility]      Patient: Naren Marc MRN: [id number]  [facility]: [id number]    YOB: 1940  Age: [de-identified] y.o. Sex: female      DOA: 2/28/2019       HPI:     Naren Marc is a [de-identified] y.o. female with PMH of T2DM w/neuropathy, HTN, Essential Tremor, HLD, GERD, CKD3A, Hypothyroidism, Lumbar Spinal Stenosis, and Osteoporosis, now presenting with complaint of SOB and \"weird feeling in chest\". Patient states that her [address] noticed and irregular heartbeat this morning so she was prompted by her PCP to be evaluated in the ED. When the nurse heard the irregular heart beat, patient stated that she had a weird feeling in her chest that was associated with SOB and lightheadedness. Prior to this episode, patient stated that she has had feeling in her chest before but it did not occur as frequently. Today, she said she had 4-5 episodes that lasted a few seconds. Patient also endorses recent abdominal pain, N/V, 6-7 episodes of diarrhea in a 24 hour, period, bilateral lower leg pain, and weakness. ED Course: Other than tachycardia of 120, vitals were normal upon arrival.    - EKG showed a-fib w/RVR at rate of 117   - VQ Scan: Nondiagnostic due to inadequate tracer   - Troponin: Negative   - CBC only remarkable for anemia (Hgb of 9.4)   - CMP showed hypomagnesemia (1.0)   - Administered Diltiazem 5mg IV, given difficulty getting access and losing an EJ   - Given prednisone and percocet for feet pain    Review of Systems   Constitutional: Negative for chills, fever and weight loss. Eyes: Negative for blurred vision. Respiratory: Positive for shortness of breath. Negative for cough. Cardiovascular: Positive for palpitations and leg swelling. Negative for chest pain. Gastrointestinal: Positive for abdominal pain, diarrhea, nausea and vomiting. Negative for blood in stool. Genitourinary: Negative for dysuria and frequency.    Musculoskeletal: Positive for joint pain. Neurological: Positive for dizziness, tremors and weakness. Negative for headaches.        Past Medical History:   Diagnosis Date    Acid reflux     Cancer ([address]     uterine    Diabetes ([address]     Dizzy spells     Essential hypertension     Gallbladder attack     Hearing loss     High cholesterol     Hypertension     Memory loss     Sinus complaint     SOB (shortness of breath)     Thyroid disease        Past Surgical History:   Procedure Laterality Date    HX APPENDECTOMY      HX CATARACT REMOVAL      HX CHOLECYSTECTOMY      HX HYSTERECTOMY      partial hysterectomy    HX ORTHOPAEDIC      5 back surgeries    HX ORTHOPAEDIC      hand arm and left knee    NEUROLOGICAL PROCEDURE UNLISTED  1993    Compression Fracture Surgery    NEUROLOGICAL PROCEDURE UNLISTED  1994    NEUROLOGICAL PROCEDURE UNLISTED  1995       Family History   Problem Relation Age of Onset    Cancer Mother 76        Cervical Cancer    Asthma Father     Heart Attack Father     Cancer Maternal Grandmother         colon cancer    Diabetes Maternal Grandmother     Hypertension Maternal Grandmother     Breast Cancer Daughter         two daughters    Diabetes Paternal Grandmother     Heart Disease Paternal Grandmother     Other Maternal Aunt         mental illness due to accident during infancy       Social History     Socioeconomic History    Marital status:      Spouse name: Not on file    Number of children: Not on file    Years of education: Not on file    Highest education level: Not on file   Tobacco Use    Smoking status: Never Smoker    Smokeless tobacco: Never Used   Substance and Sexual Activity    Alcohol use: No     Alcohol/week: 0.0 oz    Drug use: No    Sexual activity: No   Social History Narrative    ** Merged History Encounter **            Allergies   Allergen Reactions    Codeine Swelling    Codeine Other (comments)     Severe swelling    Feldene [Piroxicam] Swelling     Severe swelling of hands, face and feet. Prior to Admission Medications   Prescriptions Last Dose Informant Patient Reported? Taking? Lancets misc   No No   Sig: Precision X-tra. Check 1-2 times a day   PRECISION XTRA TEST strip   No No   Sig: USE TO CHECK 1 TO 2 TIMES DAILY   SITagliptin (JANUVIA) 50 mg tablet   Yes No   Sig: Take 50 mg by mouth two (2) times a day. alendronate (FOSAMAX) 70 mg tablet   No No   Sig: TAKE 1 TABLET EVERY 7 DAYS   amLODIPine (NORVASC) 5 mg tablet   No No   Sig: TAKE 1 TABLET DAILY   furosemide (LASIX) 40 mg tablet   No No   Sig: Take 1 Tab by mouth daily. Takes 40 mg every morning   Patient taking differently: Take 40 mg by mouth daily as needed. Takes 40 mg every morning   gabapentin (NEURONTIN) 300 mg capsule   No No   Sig: TAKE 1 CAPSULE NIGHTLY   levoFLOXacin (LEVAQUIN) 750 mg tablet   No No   Sig: Take 1 Tab by mouth every fourty-eight (48) hours. levothyroxine (SYNTHROID) 137 mcg tablet   Yes No   Sig: Take 137 mcg by mouth Daily (before breakfast). Take one tablet every other day, alternating with levothyroxine 125mcg   metFORMIN (GLUCOPHAGE) 1,000 mg tablet   No No   Sig: TAKE 1 TABLET TWICE A DAY WITH MEALS   pramipexole (MIRAPEX) 0.5 mg tablet   No No   Sig: TAKE 1 TABLET THREE TIMES A DAY   predniSONE (DELTASONE) 20 mg tablet   Yes No   Sig: Take 20 mg by mouth daily as needed for Other (for gout flares). raNITIdine (ZANTAC) 150 mg tablet   No No   Sig: Take 1 Tab by mouth two (2) times a day. simvastatin (ZOCOR) 20 mg tablet   No No   Sig: TAKE 1 TABLET NIGHTLY   tiZANidine (ZANAFLEX) 4 mg capsule   Yes No   Sig: Take 4 mg by mouth nightly.    valsartan (DIOVAN) 160 mg tablet   No No   Sig: TAKE 1 TABLET DAILY      Facility-Administered Medications: None       Physical Exam:     Patient Vitals for the past 24 hrs:   Temp Pulse Resp BP SpO2   02/28/19 2230 -- 81 20 130/55 94 %   02/28/19 2213 98.3 °F (36.8 °C) 86 17 131/61 98 %   02/28/19 2115 -- 73 13 133/59 99 %   02/28/19 1930 -- 81 24 137/54 93 %   02/28/19 1900 -- 82 16 128/60 99 %   02/28/19 1745 -- 85 17 137/62 95 %   02/28/19 1730 -- 87 15 146/67 98 %   02/28/19 1713 98.1 °F (36.7 °C) (!) 120 -- 139/65 98 %   02/28/19 1615 -- (!) 124 12 133/80 96 %   02/28/19 1510 -- -- -- -- 98 %   02/28/19 1500 98.3 °F (36.8 °C) (!) 120 24 136/76 98 %     Physical Exam   Constitutional: She is oriented to person, place, and time and well-developed, well-nourished, and in no distress. Cardiovascular: Normal rate, regular rhythm and normal heart sounds. Exam reveals no gallop and no friction rub. No murmur heard. Pulmonary/Chest: Effort normal and breath sounds normal. No respiratory distress. She has no wheezes. She has no rales. She exhibits no tenderness. Abdominal: Soft. Bowel sounds are normal. She exhibits no distension. There is no rebound and no guarding. LLQ tenderness   Musculoskeletal: She exhibits edema and tenderness. She exhibits no deformity. Neurological: She is alert and oriented to person, place, and time. Skin: Skin is warm and dry. She is not diaphoretic. IMAGING:   Xr Chest Port    Result Date: 2/28/2019  Impression:  No radiographic evidence of acute cardiopulmonary process. Nm Lung Perfusion W Vent    Result Date: 2/28/2019  IMPRESSION: Nondiagnostic VQ scan due to inadequate distribution on perfusion images into the lung parenchyma and tracer pooling at the site of injection in the left neck. Recommend clinical correlation for functioning of the left neck catheter. This could represent extravasated radiotracer.       Recent Results (from the past 12 hour(s))   EKG, 12 LEAD, INITIAL    Collection Time: 02/28/19  3:19 PM   Result Value Ref Range    Ventricular Rate 114 BPM    Atrial Rate 117 BPM    QRS Duration 88 ms    Q-T Interval 322 ms    QTC Calculation (Bezet) 443 ms    Calculated R Axis 27 degrees    Calculated T Axis -14 degrees    Diagnosis       Atrial fibrillation with rapid ventricular response  Nonspecific ST and T wave abnormality , probably digitalis effect  Abnormal ECG  No previous ECGs available  Confirmed by Valentin Altman MD, Jero Garcia ([id number]) on 2/28/2019 4:18:46 PM     CBC WITH AUTOMATED DIFF    Collection Time: 02/28/19  4:43 PM   Result Value Ref Range    WBC 10.5 4.6 - 13.2 K/uL    RBC 3.50 (L) 4.20 - 5.30 M/uL    HGB 9.9 (L) 12.0 - 16.0 g/dL    HCT 29.3 (L) 35.0 - 45.0 %    MCV 83.7 74.0 - 97.0 FL    MCH 28.3 24.0 - 34.0 PG    MCHC 33.8 31.0 - 37.0 g/dL    RDW 14.1 11.6 - 14.5 %    PLATELET 508 651 - 506 K/uL    MPV 9.5 9.2 - 11.8 FL    NEUTROPHILS 76 (H) 40 - 73 %    LYMPHOCYTES 14 (L) 21 - 52 %    MONOCYTES 9 3 - 10 %    EOSINOPHILS 1 0 - 5 %    BASOPHILS 0 0 - 2 %    ABS. NEUTROPHILS 7.8 1.8 - 8.0 K/UL    ABS. LYMPHOCYTES 1.5 0.9 - 3.6 K/UL    ABS. MONOCYTES 1.0 0.05 - 1.2 K/UL    ABS. EOSINOPHILS 0.1 0.0 - 0.4 K/UL    ABS. BASOPHILS 0.0 0.0 - 0.1 K/UL    DF AUTOMATED     METABOLIC PANEL, COMPREHENSIVE    Collection Time: 02/28/19  4:43 PM   Result Value Ref Range    Sodium 141 136 - 145 mmol/L    Potassium 3.7 3.5 - 5.5 mmol/L    Chloride 106 100 - 108 mmol/L    CO2 24 21 - 32 mmol/L    Anion gap 11 3.0 - 18 mmol/L    Glucose 139 (H) 74 - 99 mg/dL    BUN 12 7.0 - 18 MG/DL    Creatinine 1.00 0.6 - 1.3 MG/DL    BUN/Creatinine ratio 12 12 - 20      GFR est AA >60 >60 ml/min/1.73m2    GFR est non-AA 53 (L) >60 ml/min/1.73m2    Calcium 9.3 8.5 - 10.1 MG/DL    Bilirubin, total 0.6 0.2 - 1.0 MG/DL    ALT (SGPT) 13 13 - 56 U/L    AST (SGOT) 12 (L) 15 - 37 U/L    Alk.  phosphatase 67 45 - 117 U/L    Protein, total 6.6 6.4 - 8.2 g/dL    Albumin 3.5 3.4 - 5.0 g/dL    Globulin 3.1 2.0 - 4.0 g/dL    A-G Ratio 1.1 0.8 - 1.7     TROPONIN I    Collection Time: 02/28/19  4:43 PM   Result Value Ref Range    Troponin-I, QT <0.02 0.0 - 0.045 NG/ML   URIC ACID    Collection Time: 02/28/19  4:43 PM   Result Value Ref Range    Uric acid 6.2 2.6 - 7.2 MG/DL Assessment/Plan:   [de-identified] y.o. female with PMH of T2DM w/neuropathy, HTN, Essential Tremor, HLD, GERD, CKD3A, Hypothyroidism, Lumbar Spinal Stenosis, and Osteoporosis, now admitted with new onset atrial fibrillation. SOB likely 2/2 New Onset Paroxysmal Atrial Fibrillation: EKG confirmed atrial fibrillation. Currently in NSR. Asymptomatic. CHADS VASC Score of 5, which puts her at moderate to high risk of thromboembolism. - Admit to telemetry   - Start Diltiazem ER 120mg daily   - Echo in AM   - Start anticoagulation therapy    - Check TSH   - PT/OT/CM Consult    Diarrhea/Nausea and Vomiting: Recent episode of gastroenteritis, possible recurrence.    - NS @100 mL/hr until tolerating full diet   - C.diff testing   - Contact Precautions until C. Diff results   - Daily CBC   - Zofran for nausea    T2DM w/neuropathy:    - SSI and POC glucose ACHS   - Hold home Metformin 1000mg BID   - Hold home Januvia 50mg daily    - Continue home Gabapentin 300mg daily      CKD3: Baseline Creatinine of 1-1.2. 1.0 on admission.   - Daily BMP and Magnesium   - Avoid nephrotoxic medications    HTN/HLD: Well controlled. BP on admission 136/76.   - Continue home Valsartan 160mg daily   - Hold home Amlodipine 5mg daily, given initiation of Diltiazem 120mg daily   - Hold home Simvastatin 20mg daily given interaction with Diltizem   - Start Atorvastatin 10mg daily, as simvastatin 20mg equivalent    Essential Tremor: Stable. - Continue home Pramipexole    Hypothyroidism: Well controlled   - Continue home 125mcg on M,W,F   - Continue home Synthroid 137mcg on Sun,T,Th,and S   - F/u TSH    Gout/Leg Pain: Currently having pain in feet.     - Continue home Prednisone 20mg daily prn   - Continue home Tizanidine    GERD:   - Continue home Pepcid 20mg daily    Diet: Diabetic  DVT Prophylaxis: Lovenox  Code Status: FULL  Point of Contact: Fabian Grullon [phone] (Relationship: Son)    Disposition and anticipated LOS: >2 midnights    Dontae EDWARDS Kamini Whiteside MD, PGY-1  [facility]  Pager: [phone]  02/28/19 10:43 PM         Senior Resident History and Physical  [facility]     HPI:      Fahad Paul is a [de-identified] y.o. female with a PMH of T2DM w/Neuropathy, HTN, HLD, GERD, CKDIIIA, Hypothyroidism, Lumbar Spinal Stenosis, Osteoporosis now admitted with complaint of shortness of breath.     Patient reports that this AM when her [place] nurse was taking her vitals and doing a physical exam, nurse noted that patient's hear beat was abnormal. Patient also endorsed feeling short of breath during the episode of palpitations. [place] nurse then called PCP who advised patient to go to [facility] ED for evaluation. Patient reports that she was discharged home on 2/27/19 from SNF after recent hospitalization from 2/8/19 - 2/11/19 for LOLIS 2/2 urinary obstruction. Patient reports on the day of discharge she had a few episodes of feeling her heart beating fast and \"fluttering\" but it had resolved by the take she got home. The following AM patient reports she started having the \"fluterring\" episodes again, but this time they were accompanied by shortness of breath, lightheadedness, and nausea. Patient reports these episodes lasted for a few seconds and happened maybe 4-5x during the day of admission. Patient denies any history of heart disease and does not recall ever seeing a cardiologist. Over the past two days, patient also endorsed nausea the preceded diarrhea. Patient denies any fevers/chills but endorsed emesis, loss of appetite, and left upper quadrant abdominal pain.  Patient endorsed a recent about of gastroenteritis that lasted about 4 days earlier this month with similar symptoms.     HPI, ROS, PMH, PSH, Family Hx, Social Hx, home medications, and allergies as above in intern H&P.     Physical Exam:      Visit Vitals  /72 (BP 1 Location: Left arm, BP Patient Position: At rest;Head of bed elevated (Comment degrees))   Pulse 80   Temp 98.3 °F (36.8 °C)   Resp 20   Ht 5' (1.524 m)   Wt 68.5 kg (151 lb)   SpO2 94%   Breastfeeding? No   BMI 29.49 kg/m²         General:  WD, WN, NAD  HEENT:  NCAT. Conjunctiva pink, sclera anicteric. PERRL. EOMI. Pharynx moist, nonerythematous. Moist mucous membranes. Thyroid not enlarged, no nodules. No cervical, supraclavicular, or submandibular lymphadenopathy. CV:  RRR, no mumurs/rubs/gallops. PMI not displaced. RESP:  Unlabored breathing. CTAB, no wheeze, rales, or rhonchi. ABD:  Soft, LLQ TTP, nondistended. Normoactive bowel sounds. No hepatosplenomegaly. No suprapubic tenderness. No CVA tenderness. MS:  No joint deformity or instability. No atrophy. Neuro:  5/5 strength bilateral upper extremities and lower extremities. CN II-XII intact. Sensation grossly intact. A+Ox3. Ext:  No edema. 2+ radial and dp pulses bilaterally. Skin:  No rashes, lesions, or ulcers. Good turgor.   RECTAL:  See intern physical exam     Recent Results          Recent Results (from the past 12 hour(s))   EKG, 12 LEAD, INITIAL     Collection Time: 02/28/19  3:19 PM   Result Value Ref Range     Ventricular Rate 114 BPM     Atrial Rate 117 BPM     QRS Duration 88 ms     Q-T Interval 322 ms     QTC Calculation (Bezet) 443 ms     Calculated R Axis 27 degrees     Calculated T Axis -14 degrees     Diagnosis           Atrial fibrillation with rapid ventricular response  Nonspecific ST and T wave abnormality , probably digitalis effect  Abnormal ECG  No previous ECGs available  Confirmed by Radha Velez MD, Diony Donato ([id number]) on 2/28/2019 4:18:46 PM      CBC WITH AUTOMATED DIFF     Collection Time: 02/28/19  4:43 PM   Result Value Ref Range     WBC 10.5 4.6 - 13.2 K/uL     RBC 3.50 (L) 4.20 - 5.30 M/uL     HGB 9.9 (L) 12.0 - 16.0 g/dL     HCT 29.3 (L) 35.0 - 45.0 %     MCV 83.7 74.0 - 97.0 FL     MCH 28.3 24.0 - 34.0 PG     MCHC 33.8 31.0 - 37.0 g/dL     RDW 14.1 11.6 - 14.5 %     PLATELET 343 981 - 812 K/uL     MPV 9.5 9.2 - 11.8 FL     NEUTROPHILS 76 (H) 40 - 73 %     LYMPHOCYTES 14 (L) 21 - 52 %     MONOCYTES 9 3 - 10 %     EOSINOPHILS 1 0 - 5 %     BASOPHILS 0 0 - 2 %     ABS. NEUTROPHILS 7.8 1.8 - 8.0 K/UL     ABS. LYMPHOCYTES 1.5 0.9 - 3.6 K/UL     ABS. MONOCYTES 1.0 0.05 - 1.2 K/UL     ABS. EOSINOPHILS 0.1 0.0 - 0.4 K/UL     ABS. BASOPHILS 0.0 0.0 - 0.1 K/UL     DF AUTOMATED     METABOLIC PANEL, COMPREHENSIVE     Collection Time: 02/28/19  4:43 PM   Result Value Ref Range     Sodium 141 136 - 145 mmol/L     Potassium 3.7 3.5 - 5.5 mmol/L     Chloride 106 100 - 108 mmol/L     CO2 24 21 - 32 mmol/L     Anion gap 11 3.0 - 18 mmol/L     Glucose 139 (H) 74 - 99 mg/dL     BUN 12 7.0 - 18 MG/DL     Creatinine 1.00 0.6 - 1.3 MG/DL     BUN/Creatinine ratio 12 12 - 20       GFR est AA >60 >60 ml/min/1.73m2     GFR est non-AA 53 (L) >60 ml/min/1.73m2     Calcium 9.3 8.5 - 10.1 MG/DL     Bilirubin, total 0.6 0.2 - 1.0 MG/DL     ALT (SGPT) 13 13 - 56 U/L     AST (SGOT) 12 (L) 15 - 37 U/L     Alk.  phosphatase 67 45 - 117 U/L     Protein, total 6.6 6.4 - 8.2 g/dL     Albumin 3.5 3.4 - 5.0 g/dL     Globulin 3.1 2.0 - 4.0 g/dL     A-G Ratio 1.1 0.8 - 1.7     TROPONIN I     Collection Time: 02/28/19  4:43 PM   Result Value Ref Range     Troponin-I, QT <0.02 0.0 - 0.045 NG/ML   URIC ACID     Collection Time: 02/28/19  4:43 PM   Result Value Ref Range     Uric acid 6.2 2.6 - 7.2 MG/DL   PTT     Collection Time: 03/01/19 12:14 AM   Result Value Ref Range     aPTT 36.4 23.0 - 36.4 SEC   PROTHROMBIN TIME + INR     Collection Time: 03/01/19 12:14 AM   Result Value Ref Range     Prothrombin time 13.1 11.5 - 15.2 sec     INR 1.0 0.8 - 1.2     CBC WITH AUTOMATED DIFF     Collection Time: 03/01/19 12:14 AM   Result Value Ref Range     WBC 8.8 4.6 - 13.2 K/uL     RBC 3.32 (L) 4.20 - 5.30 M/uL     HGB 9.4 (L) 12.0 - 16.0 g/dL     HCT 27.7 (L) 35.0 - 45.0 %     MCV 83.4 74.0 - 97.0 FL     MCH 28.3 24.0 - 34.0 PG     MCHC 33.9 31.0 - 37.0 g/dL     RDW 13.9 11.6 - 14.5 %     PLATELET 670 608 - 310 K/uL     MPV 9.1 (L) 9.2 - 11.8 FL     NEUTROPHILS 68 40 - 73 %     LYMPHOCYTES 19 (L) 21 - 52 %     MONOCYTES 11 (H) 3 - 10 %     EOSINOPHILS 2 0 - 5 %     BASOPHILS 0 0 - 2 %     ABS. NEUTROPHILS 5.9 1.8 - 8.0 K/UL     ABS. LYMPHOCYTES 1.7 0.9 - 3.6 K/UL     ABS. MONOCYTES 1.0 0.05 - 1.2 K/UL     ABS. EOSINOPHILS 0.2 0.0 - 0.4 K/UL     ABS. BASOPHILS 0.0 0.0 - 0.1 K/UL     DF AUTOMATED           Xr Chest Port     Result Date: 2/28/2019  Impression:  No radiographic evidence of acute cardiopulmonary process.      Nm Lung Perfusion W Vent     Result Date: 2/28/2019  IMPRESSION: Nondiagnostic VQ scan due to inadequate distribution on perfusion images into the lung parenchyma and tracer pooling at the site of injection in the left neck. Recommend clinical correlation for functioning of the left neck catheter. This could represent extravasated radiotracer.     Assessment/Plan:   [de-identified] y.o. female with a PMH of T2DM w/Neuropathy, HTN, HLD, GERD, CKDIIIA, Hypothyroidism, Lumbar Spinal Stenosis, Osteoporosis, now admitted with new-onset atrial fibrillation.     1.) New-Onset Atrial Fibrillation: Denies any history of cardiac disease or heart failure. H/o of chronic lower extremity edema. Presented with c/c SOB, palpitations, and generalized weakness. EKG showed atrial fibrillation w/RVR. CXR showed no radiographic evidence of acute cardiopulmonary process. V/Q Scan showed nondiagnostic VQ scan due to inadequate distribution on perfusion images into the lung parenchyma and tracer pooling at the site of injection in the left neck. CBC, CMP, Troponin-I unremarkable. Received IV Cardizem 5 mg x1 in ED and converted to NSR. Currently asymptomatic. [id number] Score 5 with 6.7% risk of thromboembolism (no aspirin).   - Admit to remote telemetry  - Cardiac Monitoring  - Will start PO Cardizem  mg daily for rate-control  - Will start Apixaban 5 mg BID for thromboembolism prevention  - F/u B/L LE PVL  - Will order ECHO  - F/u TSH  - Monitor I&O per unit routine  - Monitor VS per unit routine  - Daily CBC, BMP, Mag     2.) Nausea & Vomiting w/Diarrhea: H/o gastroenteritis few weeks earlier. Denies any fevers/chills, hematemesis, hematochezia, or melena. Reports decreased appetite and emesis when eating solid foods. Reports LLQ tenderness and cramping.  - IVF: NS @ 100 cc/hr, reassess daily  - Will obtain C-Diff assay  - Advance diet as tolerated  - PO Zofran 4 mg q8h PRN for nausea/vomiting  - Daily CBC, BMP  - Monitor      3.) NIDDM2 w/Neuropathy: Last Hgb A1c: 7.6 (1/2019).   - Hold home Metformin 1000 mg BID  - Hold home Januvia 50 mg daily  - SSI + Accucheck ACHS  - Diabetic Diet     4.) HTN/HLD: Admission BP 110s-130s/50s-70s; HR 80s-120s  - Will stop home Amlodipine 5 mg daily since starting PO Cardizem  - Continue home Valsartan 160 mg daily  - Stop home Simvastatin 20 mg daily d/t drug-drug interactions  - Will start Atorvastatin 10 mg daily  - Monitor VS per unit routine     5.) CKDIIIA: Baseline Cr 1-1.2; admission Cr 1.00.   - Renally adjust medications  - Avoid nephrotoxic agents  - Daily BMP, Mag     6.) Hypothyroidsim: Last Thyroid Studies (1/2019): TSH 0.654, FT4 1.86  - Continue Levothyroxine 125 mcg every other day  - Continue Levothyroxine 137 mcg every other day  - F/u TSH     7.) GERD:   - Continue home Protonix 20 mg daily  - Continue home Ranitidine 150 mg BID  - Reflux precautions     *Please see intern note above for additional chronic disease mgmt.        Arturo Vinson MD, PGY-2  [facility]                     \

## 2019-03-01 NOTE — PROGRESS NOTES
Bedside and Verbal shift change report given to RAPHAEL Sandoval  (oncoming nurse) by Laurence Hidalgo RN (offgoing nurse). Report included the following information SBAR, Kardex, MAR and Recent Results.     SITUATION:  Code Status: Full Code  Reason for Admission: Atrial fibrillation with RVR ([facility] [address] [I48.91]  New onset a-fib ([facility] [address] [I48.91]  Hospital day: 1  Problem List:       Hospital Problems  Date Reviewed: 2/7/2019          Codes Class Noted POA    * (Principal) New onset a-fib [facility]) ICD-10-CM: I48.91  ICD-9-CM: 427.31  2/28/2019 Unknown        Atrial fibrillation with RVR [facility]) ICD-10-CM: I48.91  ICD-9-CM: 427.31  2/28/2019 Unknown              BACKGROUND:   Past Medical History:   Past Medical History:   Diagnosis Date    Acid reflux     Cancer ([facility] [address]     uterine    Diabetes ([facility] [address]     Dizzy spells     Essential hypertension     Gallbladder attack     Hearing loss     High cholesterol     Hypertension     Memory loss     Sinus complaint     SOB (shortness of breath)     Thyroid disease       Patient taking anticoagulants yes    Patient has a defibrillator: no    History of shots YES for example, flu, pneumonia, tetanus   Isolation History NO for example, MRSA, CDiff    ASSESSMENT:  Changes in Assessment Throughout Shift: none  Significant Changes in 24 hours (for example, RR/code, fall)  Patient has Central Line: no   Patient has Mojica Cath: no   Mobility Issues  PT  IV Patency  OR Checklist  Pending Tests    Last Vitals:  Vitals w/ MEWS Score (last day)     Date/Time MEWS Score Pulse Resp Temp BP Level of Consciousness SpO2    03/01/19 0404  3  121  (Abnormal)   20  98.2 °F (36.8 °C)  130/81  Alert  96 %    02/28/19 2321  1  80  20  98.3 °F (36.8 °C)  119/72  Alert  94 %    02/28/19 2230  --  81  20  --  130/55  --  94 %    02/28/19 2213  1  86  17  98.3 °F (36.8 °C)  131/61  Alert  98 %    02/28/19 2115  --  73  13  --  133/59  --  99 %    02/28/19 1930  --  81  24  --  137/54  --  93 %    02/28/19 0089 --  82  16  --  128/60  --  99 %    02/28/19 1745  --  85  17  --  137/62  --  95 %    02/28/19 1730  --  87  15  --  146/67  --  98 %    02/28/19 1713  --  120  (Abnormal)   --  98.1 °F (36.7 °C)  139/65  Alert  98 %    02/28/19 1615  --  124  (Abnormal)   12  --  133/80  --  96 %    02/28/19 1510  --  --  --  --  --  --  98 %    02/28/19 1500  4  120  (Abnormal)   24  98.3 °F (36.8 °C)  136/76  Alert  98 %            PAIN    Pain Assessment    Pain Intensity 1: 8 (03/01/19 0418)    Pain Location 1: Foot    Pain Intervention(s) 1: Medication (see MAR)    Patient Stated Pain Goal: 0  Intervention effective: yes  Time of last intervention: 0418 Reassessment Completed: yes   Other actions taken for pain: Distraction    Last 3 Weights:  Last 3 Recorded Weights in this Encounter    02/28/19 1500 02/28/19 1713   Weight: 68.5 kg (151 lb) 68.5 kg (151 lb)   Weight change:     INTAKE/OUPUT    Current Shift: No intake/output data recorded. Last three shifts: 02/27 1901 - 03/01 0700  In: 240 [P.O.:240]  Out: 700 [Urine:700]    RECOMMENDATIONS AND DISCHARGE PLANNING  Patient needs and requests: Pain Control    Pending tests/procedures: labs, Echocardiogram     Discharge plan for patient: Home    Discharge planning Needs or Barriers: none    Estimated Discharge Date: 3/03/2019 Posted on Whiteboard in Patients Room: yes       \"HEALS\" SAFETY CHECK  A safety check occurred in the patient's room between off going nurse and oncoming nurse listed above. The safety check included the below items:    H  High Alert Medications Verify all high alert medication drips (heparin, PCA, etc.)  E  Equipment Suction is set up for ALL patients (with yanker)  Red plugs utilized for all equipment (IV pumps, etc.)  WOWs wiped down at end of shift.   Room stocked with oxygen, suction, and other unit-specific supplies  A  Alarms Bed alarm is set for fall risk patients  Ensure chair alarm is in place and activated if patient is up in a chair  L  Lines Check IV for any infiltration  Mojica bag is empty if patient has a Mojica   Tubing and IV bags are labeled  S  Safety  Room is clean, patient is clean, and equipment is clean. Hallways are clear from equipment besides carts. Fall bracelet on for fall risk patients  Ensure room is clear and free of clutter  Suction is set up for ALL patients (with dipti)  Hallways are clear from equipment besides carts.    Isolation precautions followed, supplies available outside room, sign posted    Janelle Sarmiento RN

## 2019-03-01 NOTE — PROGRESS NOTES
DISCHARGE PLANNING:  Reason for Admission:   Atrial Fibriliation               RRAT Score:   23               Resources/supports as identified by patient/family:  Family, home health and caregiver                 Top Challenges facing patient (as identified by patient/family and CM):  Assistance increase in the home to provide care                     Finances/Medication cost?    none                Transportation? Provided by family              Support system or lack thereof? Strong family support                     Living arrangements? Lives with her daughter             Self-care/ADLs/Cognition? Will require total care when discharged home with her family          Current Advanced Directive/Advance Care Plan:  None on file                          Plan for utilizing home health:    ordered                      [facility]                 Transition of Care Plan:    Home with home health    Care Management Interventions  PCP Verified by CM: Yes(DR. CHANTAL MARIN)  Palliative Care Criteria Met (RRAT>21 & CHF Dx)?: No  Mode of Transport at Discharge:  Other (see comment)(FAMILY WILL TRANSPORT)  Transition of Care Consult (CM Consult): Discharge Planning, Home Health  Current Support Network: Relative's Home, Family Lives Nearby(LIVES WITH HER DAUGHTER)  Confirm Follow Up Transport: Family  Plan discussed with Pt/Family/Caregiver: Yes(PT [place])  Halls of Choice Offered: Yes  [address] Provided?: No  Discharge Location  Discharge Placement: Home with home health     [address] [place] [address] Drive  [phone]

## 2019-03-01 NOTE — PROGRESS NOTES
Problem: Self Care Deficits Care Plan (Adult)  Goal: *Acute Goals and Plan of Care (Insert Text)  Outcome: Resolved/Met Date Met: 03/01/19  Occupational Therapy EVALUATION/discharge    Patient: Frankey Dame (60 y.o. female)  Date: 3/1/2019  Primary Diagnosis: Atrial fibrillation with RVR ([facility] [address] [I48.91]  New onset a-fib ([facility] [address] [I48.91]       Precautions:   Fall    ASSESSMENT AND RECOMMENDATIONS:  Based on the objective data described below, the patient is able to perform basic self care tasks without assistance while seated. Supervision given for functional standing and transfers. Will defer to PT for mobility training. Patient has a supportive family at home to assist her prn but they work most of the day. Skilled occupational therapy is not indicated at this time. Discharge Recommendations: None  Further Equipment Recommendations for Discharge: N/A      Barriers to Learning/Limitations: None  Compensate with: visual, verbal, tactile, kinesthetic cues/model     COMPLEXITY     Eval Complexity: History: LOW Complexity : Brief history review ; Examination: LOW Complexity : 1-3 performance deficits relating to physical, cognitive , or psychosocial skils that result in activity limitations and / or participation restrictions ; Decision Making:LOW Complexity : No comorbidities that affect functional and no verbal or physical assistance needed to complete eval tasks  Assessment: Low Complexity     SUBJECTIVE:   Patient stated My daughter isn't home much to help me.     OBJECTIVE DATA SUMMARY:     Past Medical History:   Diagnosis Date    Acid reflux     Cancer ([facility] [address]     uterine    Diabetes ([facility] [address] [facility]     Dizzy spells     Essential hypertension     Gallbladder attack     Hearing loss     High cholesterol     Hypertension     Memory loss     Sinus complaint     SOB (shortness of breath)     Thyroid disease      Past Surgical History:   Procedure Laterality Date    HX APPENDECTOMY      HX CATARACT REMOVAL  HX CHOLECYSTECTOMY      HX HYSTERECTOMY      partial hysterectomy    HX ORTHOPAEDIC      5 back surgeries    HX ORTHOPAEDIC      hand arm and left knee    NEUROLOGICAL PROCEDURE UNLISTED  1993    Compression Fracture Surgery    NEUROLOGICAL PROCEDURE UNLISTED  1994    NEUROLOGICAL PROCEDURE UNLISTED  1995     Prior Level of Function/Home Situation: Pt was modified independent with basic self care tasks and functional mobility PTA. Home Situation  Home Environment: Private residence  # Steps to Enter: 7  One/Two Story Residence: Two story  Living Alone: No  Support Systems: Family member(s)  Patient Expects to be Discharged to[de-identified] Private residence  Current DME Used/Available at Home: [place] or Shower Type: Shower(with seat)  [x]     Right hand dominant   []     Left hand dominant  Cognitive/Behavioral Status:  Neurologic State: Alert  Orientation Level: Oriented X4  Cognition: Appropriate decision making; Follows commands  Safety/Judgement: Awareness of environment; Fall prevention    Skin: Intact on UEs    Edema: None noted in UEs    Vision/Perceptual:    Acuity: Within Defined Limits      Coordination:  Fine Motor Skills-Upper: Left Intact; Right Intact    Gross Motor Skills-Upper: Left Intact; Right Intact    Balance:  Sitting: Intact  Standing: With support    Strength:  Strength: Generally decreased, functional(UEs; 4/5 throughout)    Tone & Sensation:  Tone: Normal(UEs)  Sensation: Intact(UEs)    Range of Motion:  AROM: Within functional limits(UEs)    Functional Mobility and Transfers for ADLs:  Bed Mobility:  Supine to Sit: Supervision  Sit to Supine: Supervision  Transfers:  Sit to Stand: Supervision   Toilet Transfer : Supervision    ADL Assessment:  Feeding: Independent    Oral Facial Hygiene/Grooming: Modified Independent    Bathing: Supervision    Upper Body Dressing: Modified independent    Lower Body Dressing: Supervision    Toileting: Modified independent    Pain:  Pt reports 0/10 pain or discomfort prior to treatment.    Pt reports 0/10 pain or discomfort post treatment. Activity Tolerance:   Good    Please refer to the flowsheet for vital signs taken during this treatment. After treatment:   []  Patient left in no apparent distress sitting up in chair  [x]  Patient left in no apparent distress in bed  [x]  Call bell left within reach  [x]  Nursing notified  []  Caregiver present  []  Bed alarm activated    COMMUNICATION/EDUCATION:   Communication/Collaboration:  [x]      Home safety education was provided and the patient/caregiver indicated understanding. [x]      Patient/family have participated as able and agree with findings and recommendations. []      Patient is unable to participate in plan of care at this time.     Onur Downey MS OTR/L  Time Calculation: 15 mins

## 2019-03-01 NOTE — ED NOTES
Patient to be admitted to the hospital for a-fib and further evaluation she is currently awake and alert in no acute distress.

## 2019-03-01 NOTE — ROUTINE PROCESS
TRANSFER - IN REPORT:    Verbal report received from Manav Torres RN(name) on Milad Armando  being received from ED(unit) for routine progression of care      Report consisted of patients Situation, Background, Assessment and   Recommendations(SBAR). Information from the following report(s) SBAR, Kardex, ED Summary and Recent Results was reviewed with the receiving nurse. Opportunity for questions and clarification was provided. Assessment completed upon patients arrival to unit and care assumed.

## 2019-03-01 NOTE — DISCHARGE INSTRUCTIONS
Patient Education        Learning About Atrial Fibrillation  What is atrial fibrillation? Atrial fibrillation (say \"AY-tree-corky [id number]-EAE-[id number]\") is the most common type of irregular heartbeat (arrhythmia). Normally, the heart beats in a strong, steady rhythm. In atrial fibrillation, a problem with the heart's electrical system causes the two upper parts of the heart (the atria) to quiver, or fibrillate. Your heart rate also may be faster than normal.  Atrial fibrillation can be dangerous because if the heartbeat isn't strong and steady, blood can collect, or pool, in the atria. And pooled blood is more likely to form clots. Clots can travel to the brain, block blood flow, and cause a stroke. Atrial fibrillation can also lead to heart failure. Treatment for atrial fibrillation helps prevent stroke and heart failure. It also helps relieve symptoms. Atrial fibrillation is often caused by another heart problem. It may happen after heart surgery. It may also be caused by other problems, such as an overactive thyroid gland or lung disease. Many people with atrial fibrillation are able to live full and active lives. What are the symptoms? Some people feel symptoms when they have episodes of atrial fibrillation. But other people don't notice any symptoms. If you have symptoms, you may feel:  · A fluttering, racing, or pounding feeling in your chest called palpitations. · Weak or tired. · Dizzy or lightheaded. · Short of breath. · Chest pain. · Confused. You may notice signs of atrial fibrillation when you check your pulse. Your pulse may seem uneven or fast.  What can you expect when you have atrial fibrillation? At first, spells of atrial fibrillation may come on suddenly and last a short time. It may go away on its own or it goes away after treatment. This is called paroxysmal atrial fibrillation. Over time, the spells may last longer and occur more often. They often don't go away on their own.   How is it treated? Treatments can help you feel better and prevent future problems, especially stroke and heart failure. The main types of treatment slow the heart rate, control the heart rhythm, and help prevent stroke. Your treatment will depend on the cause of your atrial fibrillation, your symptoms, and your risk for stroke. · Heart rate treatment. Medicine may be used to slow your heart rate. Your heartbeat may still be irregular. But these medicines keep your heart from beating too fast. They may also help relieve your symptoms. · Heart rhythm treatment. Different treatments may be used to try to stop atrial fibrillation and keep it from returning. They can also relieve symptoms. These treatments include medicine, electrical cardioversion to shock the heart back to a normal rhythm, a procedure called catheter ablation, and heart surgery. · Stroke prevention. You and your doctor can decide how to lower your risk. You may decide to take a blood-thinning medicine, such as aspirin or an anticoagulant. How can you live well with it? You can live well and help manage atrial fibrillation by having a heart-healthy lifestyle. To have a heart-healthy lifestyle:  · Don't smoke. · Eat heart-healthy foods. · Be active. Talk to your doctor about what type and level of exercise is safe for you. · Stay at a healthy weight. Lose weight if you need to. · Manage stress. · Avoid alcohol if it triggers symptoms. · Manage other health problems such as high blood pressure, high cholesterol, and diabetes. · Avoid getting sick from the flu. Get a flu shot every year. Where can you learn more? Go to http://nathalia-kam.info/. Enter [phone] in the search box to learn more about \"Learning About Atrial Fibrillation. \"  Current as of: July 22, 2018  Content Version: 11.9  © 0975-8505 [employer].  Care instructions adapted under license by [employer] (which disclaims liability or warranty for this information). If you have questions about a medical condition or this instruction, always ask your healthcare professional. [address] any warranty or liability for your use of this information. Patient Education        Learning About Your Stroke Risk When You Have Atrial Fibrillation  How does atrial fibrillation affect your stroke risk? Normally, the heart beats in a strong, steady rhythm. In atrial fibrillation, the two upper parts of the heart (the atria) quiver, or fibrillate, and the heart does not beat in a regular rhythm. Your heart rate also may be faster than normal.  This can be dangerous because if the heartbeat isn't strong and steady, blood can collect, or pool, in the heart. And pooled blood is more likely to form clots. Clots can travel to the brain, block blood flow, and cause a stroke. A stroke can cause sudden numbness or weakness of the face, arm, or leg, especially on one side of the body. Strokes can also cause sudden confusion, trouble speaking or understanding, or even trouble seeing in one or both eyes. Strokes can even cause death. Atrial fibrillation increases your stroke risk. But not everyone with atrial fibrillation has the same stroke risk. How do you know what your stroke risk is? Your doctor can help you know your risk based on your age, sex, and health. Things that raise your risk are called risk factors. The more risk factors you have, the higher your risk. Risk factors for stroke include:  · Age. Being older than 72 raises your risk. · Being female. Women are at higher risk. · Other health problems. You may have other health problems that raise your risk. These include:  ? Heart failure. ? High blood pressure. ? A previous stroke or transient ischemic attack (TIA). ? Heart attack, peripheral arterial disease, or other blood vessel disease. ? Diabetes.   Your doctor will use a kind of scorecard to add up your risk factors and estimate your risk for stroke. This score can help you and your doctor decide how to lower your risk. Should you take medicine to lower your stroke risk? When you know what your stroke risk is, you and your doctor can talk about your options. You'll decide whether or not to take medicine--aspirin or anticoagulants--to help prevent blood clots. These medicines are often called blood thinners, but they don't actually thin your blood. Instead, they increase the time it takes for a blood clot to form. Blood thinners lower the risk of stroke in people who have atrial fibrillation. But how much your risk will be lowered depends on how high your risk was to start with. There are risks to taking a blood thinner. When your blood clots more slowly, you have a higher risk of bleeding problems. These problems include bleeding problems in and around the brain, bleeding in the stomach and intestines, bruising and bleeding if you are hurt, and serious skin rash. You and your doctor can compare your risk of stroke with your risk of bleeding from the medicine. You can also discuss how you feel about taking medicine every day. Follow-up care is a key part of your treatment and safety. Be sure to make and go to all appointments, and call your doctor if you are having problems. It's also a good idea to know your test results and keep a list of the medicines you take. Where can you learn more? Go to http://nathalia-kam.info/. Enter V751 in the search box to learn more about \"Learning About Your Stroke Risk When You Have Atrial Fibrillation. \"  Current as of: July 22, 2018  Content Version: 11.9  © 2288-3165 [employer]. Care instructions adapted under license by [employer] (which disclaims liability or warranty for this information).  If you have questions about a medical condition or this instruction, always ask your healthcare professional. [place] [address] any warranty or liability for your use of this information.

## 2019-03-01 NOTE — HOME CARE
Rounded on this \"[facility]\" patient. . I left a brochure on [facility] with the patient. [facility] will be available if needed. Patient said she has home health with [facility]. [address] [place] [address], , informed.

## 2019-03-01 NOTE — PROGRESS NOTES
[facility]  Intern Progress Note    Patient: Frankey Dame MRN: [id number]   SSN: [id number]  YOB: 1940   Age: [de-identified] y.o. Sex: female      Admit Date: 2/28/2019    LOS: 1 day   Chief Complaint   Patient presents with    Generalized Body Aches       Subjective:     Patient seen and examined at bedside. Mild SOB reported last night that quickly resolved on its own. No CP/SOB during encounter. LATA. Lengthy discussion regarding atrial fibrillation, recommendation for anticoagulation and available agents. Factors discussed included agent selection, reversibility, adverse effect profiles, associated costs, frequency of lab draws and dietary restrictions. Review of Systems   Constitutional: Negative for chills and fever. Eyes: Negative for blurred vision and double vision. Respiratory: Positive for shortness of breath. Negative for cough. Cardiovascular: Negative for chest pain and leg swelling. Gastrointestinal: Negative for abdominal pain. Genitourinary: Negative for dysuria and hematuria. Musculoskeletal: Positive for joint pain. Neurological: Negative for dizziness and headaches. Objective:     Visit Vitals  /81 (BP 1 Location: Left arm, BP Patient Position: At rest;Head of bed elevated (Comment degrees))   Pulse (!) 121   Temp 98.2 °F (36.8 °C)   Resp 20   Ht 5' (1.524 m)   Wt 68.5 kg (151 lb)   SpO2 96%   Breastfeeding? No   BMI 29.49 kg/m²       Physical Exam:   General appearance: well appearing elderly white female  CV: RRR, no m/g/r  Lungs: normal respiratory effort; mild coarseness STEPHANIE otherwise CTA  Abd: NT/ND  Extremities: Tender R knee, no edema; strength 5/5    Intake and Output:  Current Shift: No intake/output data recorded.   Last three shifts: 02/27 1901 - 03/01 0700  In: 360 [P.O.:360]  Out: 700 [Urine:700]    Lab/Data Review:  Recent Results (from the past 12 hour(s))   PTT    Collection Time: 03/01/19 12:14 AM   Result Value Ref Range aPTT 36.4 23.0 - 36.4 SEC   PROTHROMBIN TIME + INR    Collection Time: 03/01/19 12:14 AM   Result Value Ref Range    Prothrombin time 13.1 11.5 - 15.2 sec    INR 1.0 0.8 - 1.2     METABOLIC PANEL, BASIC    Collection Time: 03/01/19 12:14 AM   Result Value Ref Range    Sodium 142 136 - 145 mmol/L    Potassium 3.9 3.5 - 5.5 mmol/L    Chloride 106 100 - 108 mmol/L    CO2 23 21 - 32 mmol/L    Anion gap 13 3.0 - 18 mmol/L    Glucose 145 (H) 74 - 99 mg/dL    BUN 12 7.0 - 18 MG/DL    Creatinine 1.02 0.6 - 1.3 MG/DL    BUN/Creatinine ratio 12 12 - 20      GFR est AA >60 >60 ml/min/1.73m2    GFR est non-AA 52 (L) >60 ml/min/1.73m2    Calcium 9.3 8.5 - 10.1 MG/DL   CBC WITH AUTOMATED DIFF    Collection Time: 03/01/19 12:14 AM   Result Value Ref Range    WBC 8.8 4.6 - 13.2 K/uL    RBC 3.32 (L) 4.20 - 5.30 M/uL    HGB 9.4 (L) 12.0 - 16.0 g/dL    HCT 27.7 (L) 35.0 - 45.0 %    MCV 83.4 74.0 - 97.0 FL    MCH 28.3 24.0 - 34.0 PG    MCHC 33.9 31.0 - 37.0 g/dL    RDW 13.9 11.6 - 14.5 %    PLATELET 930 358 - 263 K/uL    MPV 9.1 (L) 9.2 - 11.8 FL    NEUTROPHILS 68 40 - 73 %    LYMPHOCYTES 19 (L) 21 - 52 %    MONOCYTES 11 (H) 3 - 10 %    EOSINOPHILS 2 0 - 5 %    BASOPHILS 0 0 - 2 %    ABS. NEUTROPHILS 5.9 1.8 - 8.0 K/UL    ABS. LYMPHOCYTES 1.7 0.9 - 3.6 K/UL    ABS. MONOCYTES 1.0 0.05 - 1.2 K/UL    ABS. EOSINOPHILS 0.2 0.0 - 0.4 K/UL    ABS.  BASOPHILS 0.0 0.0 - 0.1 K/UL    DF AUTOMATED     TSH 3RD GENERATION    Collection Time: 03/01/19 12:14 AM   Result Value Ref Range    TSH 0.60 0.36 - 3.74 uIU/mL   MAGNESIUM    Collection Time: 03/01/19 12:14 AM   Result Value Ref Range    Magnesium 1.0 (L) 1.6 - 2.6 mg/dL     Telemetry NSR 79; Sinus Tach 140 (2 minutes)   Oxygen NONE     Assessment and Plan:     78 y.o. female with a PMH of T2DM w/Neuropathy, HTN, HLD, GERD, CKDIIIA, Hypothyroidism, Lumbar Spinal Stenosis, Osteoporosis, now admitted with new-onset atrial fibrillation.     1.) New-Onset Atrial Fibrillation: Denies any history of cardiac disease or heart failure. H/o of chronic lower extremity edema. Presented with c/c SOB, palpitations, and generalized weakness. EKG showed atrial fibrillation w/RVR. CXR showed no radiographic evidence of acute cardiopulmonary process. V/Q Scan showed nondiagnostic VQ scan due to inadequate distribution on perfusion images into the lung parenchyma and tracer pooling at the site of injection in the left neck. CBC, CMP, Troponin-I unremarkable. Received IV Cardizem 5 mg x1 in ED and converted to NSR. Currently asymptomatic. PDE3BNo-GOPh Score 5 with 6.7% risk of thromboembolism (no aspirin). - Cardiac Monitoring  - Continue Cardizem  mg daily for rate-control  - Will likely start NOAC today  - F/u B/L LE PVL  - F/U ECHO  - Monitor I&O per unit routine  - Monitor VS per unit routine  - Daily CBC, BMP, Mag     2.) Nausea & Vomiting w/Diarrhea, improving: H/o gastroenteritis few weeks earlier. Denies any fevers/chills, hematemesis, hematochezia, or melena. Reports decreased appetite and emesis when eating solid foods. Reports LLQ tenderness and cramping.  - D/C NS @ 100 cc/hr, reassess daily  - F/U C-Diff assay  - Tolerating diet  - PO Zofran 4 mg q8h PRN for nausea/vomiting  - Daily CBC, BMP  - Monitor      3.) NIDDM2 w/Neuropathy: Last Hgb A1c: 7.6 (1/2019).   - Hold home Metformin 1000 mg BID  - Hold home Januvia 50 mg daily  - SSI + Accucheck ACHS  - Diabetic Diet     4.) HTN/HLD: Admission BP 110s-130s/50s-70s; HR 80s-120s  - Will stop home Amlodipine 5 mg daily since starting PO Cardizem  - Continue home Valsartan 160 mg daily  - Stop home Simvastatin 20 mg daily d/t drug-drug interactions  - Will start Atorvastatin 10 mg daily  - Monitor VS per unit routine     5.) CKDIIIA: Baseline Cr 1-1.2; admission Cr 1.00.   - Renally adjust medications  - Avoid nephrotoxic agents  - Daily BMP, Mag     6.) Hypothyroidsim: Last Thyroid Studies (1/2019): TSH 0.654, FT4 1.86  - Continue Levothyroxine 125 mcg every other day  - Continue Levothyroxine 137 mcg every other day  - F/u TSH     7.) GERD:   - Continue home Protonix 20 mg daily  - Continue home Ranitidine 150 mg BID  - Reflux precautions    Diet  Diabetic   DVT Prophylaxis  Lovenox   Code status  FULL   Disposition  LOS to home with [place] today/tomorrow     [address]   Relationship: Luisito Manriquez [phone] [facility], , PGY-1   [facility] Medicine   Intern Pager: [phone]   March 1, 2019, 9:20 AM

## 2019-03-01 NOTE — PROGRESS NOTES
*ATTENTION:  This note has been created by a medical student for educational purposes only. Please do not refer to the content of this note for clinical decision-making, billing, or other purposes. Please see attending physicians note to obtain clinical information on this patient. * Progress Note Patient: Lamarr Dubin MRN: [id number]  SSN: [id number] YOB: 1940  Age: [de-identified] y.o. Sex: female Admit Date: 2/28/2019 LOS: 1 day Subjective:  
Pt felt that she had two episodes of SOB and heart palpitations lasting for a few seconds overnight, but none this morning. She also complains of left leg and foot pain that she thinks is secondary to gout. Objective:  
 
Vitals:  
 02/28/19 2213 02/28/19 2230 02/28/19 2321 03/01/19 0404 BP: 131/61 130/55 119/72 130/81 Pulse: 86 81 80 (!) 121 Resp: 17 20 20 20 Temp: 98.3 °F (36.8 °C)  98.3 °F (36.8 °C) 98.2 °F (36.8 °C) SpO2: 98% 94% 94% 96% Weight:      
Height:      
  
 
Intake and Output: 
Current Shift: No intake/output data recorded. Last three shifts: 02/27 1901 - 03/01 0700 In: 360 [P.O.:360] Out: 700 [Urine:700] Physical Exam:  
GENERAL: alert, cooperative, no distress, appears stated age LUNG: clear to auscultation bilaterally HEART: regular rate and rhythm ABDOMEN: soft, non-tender. Bowel sounds normal. No masses,  no organomegaly EXTREMITIES:  extremities normal, atraumatic, no cyanosis or edema, no edema Lab/Data Review: All lab results for the last 24 hours reviewed. Recent Results (from the past 24 hour(s)) EKG, 12 LEAD, INITIAL Collection Time: 02/28/19  3:19 PM  
Result Value Ref Range Ventricular Rate 114 BPM  
 Atrial Rate 117 BPM  
 QRS Duration 88 ms Q-T Interval 322 ms QTC Calculation (Bezet) 443 ms Calculated R Axis 27 degrees Calculated T Axis -14 degrees Diagnosis Atrial fibrillation with rapid ventricular response Nonspecific ST and T wave abnormality , probably digitalis effect Abnormal ECG No previous ECGs available Confirmed by Paola Damico MD, [employer] ([id number]) on 2/28/2019 4:18:46 PM 
  
CBC WITH AUTOMATED DIFF Collection Time: 02/28/19  4:43 PM  
Result Value Ref Range WBC 10.5 4.6 - 13.2 K/uL  
 RBC 3.50 (L) 4.20 - 5.30 M/uL HGB 9.9 (L) 12.0 - 16.0 g/dL HCT 29.3 (L) 35.0 - 45.0 % MCV 83.7 74.0 - 97.0 FL  
 MCH 28.3 24.0 - 34.0 PG  
 MCHC 33.8 31.0 - 37.0 g/dL  
 RDW 14.1 11.6 - 14.5 % PLATELET 805 507 - 004 K/uL MPV 9.5 9.2 - 11.8 FL  
 NEUTROPHILS 76 (H) 40 - 73 % LYMPHOCYTES 14 (L) 21 - 52 % MONOCYTES 9 3 - 10 % EOSINOPHILS 1 0 - 5 % BASOPHILS 0 0 - 2 %  
 ABS. NEUTROPHILS 7.8 1.8 - 8.0 K/UL  
 ABS. LYMPHOCYTES 1.5 0.9 - 3.6 K/UL  
 ABS. MONOCYTES 1.0 0.05 - 1.2 K/UL  
 ABS. EOSINOPHILS 0.1 0.0 - 0.4 K/UL  
 ABS. BASOPHILS 0.0 0.0 - 0.1 K/UL  
 DF AUTOMATED METABOLIC PANEL, COMPREHENSIVE Collection Time: 02/28/19  4:43 PM  
Result Value Ref Range Sodium 141 136 - 145 mmol/L Potassium 3.7 3.5 - 5.5 mmol/L Chloride 106 100 - 108 mmol/L  
 CO2 24 21 - 32 mmol/L Anion gap 11 3.0 - 18 mmol/L Glucose 139 (H) 74 - 99 mg/dL BUN 12 7.0 - 18 MG/DL Creatinine 1.00 0.6 - 1.3 MG/DL  
 BUN/Creatinine ratio 12 12 - 20 GFR est AA >60 >60 ml/min/1.73m2 GFR est non-AA 53 (L) >60 ml/min/1.73m2 Calcium 9.3 8.5 - 10.1 MG/DL Bilirubin, total 0.6 0.2 - 1.0 MG/DL  
 ALT (SGPT) 13 13 - 56 U/L  
 AST (SGOT) 12 (L) 15 - 37 U/L Alk. phosphatase 67 45 - 117 U/L Protein, total 6.6 6.4 - 8.2 g/dL Albumin 3.5 3.4 - 5.0 g/dL Globulin 3.1 2.0 - 4.0 g/dL A-G Ratio 1.1 0.8 - 1.7    
TROPONIN I Collection Time: 02/28/19  4:43 PM  
Result Value Ref Range Troponin-I, QT <0.02 0.0 - 0.045 NG/ML  
URIC ACID Collection Time: 02/28/19  4:43 PM  
Result Value Ref Range Uric acid 6.2 2.6 - 7.2 MG/DL  
PTT  Collection Time: 03/01/19 12:14 AM  
 Result Value Ref Range aPTT 36.4 23.0 - 36.4 SEC PROTHROMBIN TIME + INR Collection Time: 03/01/19 12:14 AM  
Result Value Ref Range Prothrombin time 13.1 11.5 - 15.2 sec INR 1.0 0.8 - 1.2 METABOLIC PANEL, BASIC Collection Time: 03/01/19 12:14 AM  
Result Value Ref Range Sodium 142 136 - 145 mmol/L Potassium 3.9 3.5 - 5.5 mmol/L Chloride 106 100 - 108 mmol/L  
 CO2 23 21 - 32 mmol/L Anion gap 13 3.0 - 18 mmol/L Glucose 145 (H) 74 - 99 mg/dL BUN 12 7.0 - 18 MG/DL Creatinine 1.02 0.6 - 1.3 MG/DL  
 BUN/Creatinine ratio 12 12 - 20 GFR est AA >60 >60 ml/min/1.73m2 GFR est non-AA 52 (L) >60 ml/min/1.73m2 Calcium 9.3 8.5 - 10.1 MG/DL  
CBC WITH AUTOMATED DIFF Collection Time: 03/01/19 12:14 AM  
Result Value Ref Range WBC 8.8 4.6 - 13.2 K/uL  
 RBC 3.32 (L) 4.20 - 5.30 M/uL HGB 9.4 (L) 12.0 - 16.0 g/dL HCT 27.7 (L) 35.0 - 45.0 % MCV 83.4 74.0 - 97.0 FL  
 MCH 28.3 24.0 - 34.0 PG  
 MCHC 33.9 31.0 - 37.0 g/dL  
 RDW 13.9 11.6 - 14.5 % PLATELET 744 511 - 956 K/uL MPV 9.1 (L) 9.2 - 11.8 FL  
 NEUTROPHILS 68 40 - 73 % LYMPHOCYTES 19 (L) 21 - 52 % MONOCYTES 11 (H) 3 - 10 % EOSINOPHILS 2 0 - 5 % BASOPHILS 0 0 - 2 %  
 ABS. NEUTROPHILS 5.9 1.8 - 8.0 K/UL  
 ABS. LYMPHOCYTES 1.7 0.9 - 3.6 K/UL  
 ABS. MONOCYTES 1.0 0.05 - 1.2 K/UL  
 ABS. EOSINOPHILS 0.2 0.0 - 0.4 K/UL  
 ABS. BASOPHILS 0.0 0.0 - 0.1 K/UL  
 DF AUTOMATED    
TSH 3RD GENERATION Collection Time: 03/01/19 12:14 AM  
Result Value Ref Range TSH 0.60 0.36 - 3.74 uIU/mL MAGNESIUM Collection Time: 03/01/19 12:14 AM  
Result Value Ref Range Magnesium 1.0 (L) 1.6 - 2.6 mg/dL CXR 2/28/19: IMPRESSION Impression: No radiographic evidence of acute cardiopulmonary process. 
  
V/Q scan 2/28/19 IMPRESSION: 
  
Nondiagnostic VQ scan due to inadequate distribution on perfusion images into 
the lung parenchyma and tracer pooling at the site of injection in the left neck. Recommend clinical correlation for functioning of the left neck catheter. This could represent extravasated radiotracer. LE PVL 2/28/19: 
No evidence of DVT within the right lower extremity or left common femoral vein Assessment:  
 
Principal Problem: 
  New onset a-fib ([address] (2/28/2019) Active Problems: 
  Atrial fibrillation with RVR ([address] (2/28/2019) Plan:  
New onset Afib: V/Q scan 2/28/19 nondiagnostic. EKG 2/28/19 showed Afib with RVR 
CXR 2/28/19 no acute process Troponins unremarkable INR 1.0, PT 13.1, and PTT 36.4 all wnl PVL of bilateral lower extremities without evidence of DVT 
TSH wnl Given 5 mg Cardizem IV in ED before the IV blowing with subsequent conversion to normal sinus rhythm. [id number] score 5 with 6.7% risk of thromboembolism 
cardiac monitoring. Continue PO Cardizem 120 mg daily for rate control and Apixaban 5 mg BID for thromboemboli prevention Lovenox 30 mg daily Continue bASA f/u ECHO Nausea, vomiting, and diarrhea: 
H/o gastroenteritis few weeks ago Reported LLQ tenderness and cramping that has since resolved. F/u C-diff Diabetic diet PO Zofran 4mg q8h PRN for nausea Daily CBC and BMP Anemia- trending down Hgb at 9.9 > 9.4 will trend and offer transfusion if <7. Hypomagnesemia Mg at low at 1. Will replete with 2g IV . T2DM with neuropathy Last A1C 7.6 in Jan 2019 On metformin 1000 mg BID and Januvia 50 mg daily at home. Will hold here. SSI and Accucheck Diabetic diet Last blood glucose 145. Gabapentin 300 mg for neuropathy HTN On amlodipine 5mg at home. Will hold since continuing cardizem. Continue Valsartan 160 mg daily CKD3A- stable Baseline Cr 1-1.2, 1 on admission Now Cr 1.02 and BUN 12. Fluids NS at 100ml/h Hypothyroid Last thyroid studies Jan 2019, TSH 0.654, Ft4 1.86 Continue alternating levothyroxine 125 mcg or 137 mcg every other day. TSH 3.1.19 is 0.6 within normal limits GERD 
 Continue home Protonix 20 mg daily and Ranitidine 150 mg BID Hypercholesterolemia Continue atorvastatin 10 mg daily Signed By: Beatriz Sahni   
 March 1, 2019

## 2019-03-01 NOTE — PROGRESS NOTES
Dr. Eleanor James was informed of patient's HR going up to 155 bpm and A-fib by telemetry at 0355. Assessed patient, she stated that she transferred from bed to bedside commode at that time, she denied having chest pain, dizziness or light headedness. Now HR is at 112- 1222 bpm. No new order was received.

## 2019-03-02 NOTE — PROGRESS NOTES
Pt discharged to home, to be transported by daughter. Pt confirms she can afford her medications. AVS reviewed with pt to include medications and follow up appts - pt verbalized understanding.

## 2019-06-01 ENCOUNTER — HOSPITAL ENCOUNTER (EMERGENCY)
Age: 79
Discharge: HOME | End: 2019-06-01
Attending: EMERGENCY MEDICINE
Payer: MEDICARE

## 2019-06-01 ENCOUNTER — APPOINTMENT (OUTPATIENT)
Dept: CT IMAGING | Age: 79
End: 2019-06-01
Attending: EMERGENCY MEDICINE
Payer: MEDICARE

## 2019-06-01 ENCOUNTER — APPOINTMENT (OUTPATIENT)
Dept: GENERAL RADIOLOGY | Age: 79
End: 2019-06-01
Attending: EMERGENCY MEDICINE
Payer: MEDICARE

## 2019-06-01 VITALS
OXYGEN SATURATION: 100 % | RESPIRATION RATE: 18 BRPM | SYSTOLIC BLOOD PRESSURE: 114 MMHG | HEART RATE: 72 BPM | DIASTOLIC BLOOD PRESSURE: 92 MMHG | WEIGHT: 140 LBS | BODY MASS INDEX: 26.43 KG/M2 | TEMPERATURE: 98.4 F | HEIGHT: 61 IN

## 2019-06-01 DIAGNOSIS — S09.90XA CLOSED HEAD INJURY, INITIAL ENCOUNTER: ICD-10-CM
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
## 2019-06-01 DIAGNOSIS — S46.811A STRAIN OF RIGHT TRAPEZIUS MUSCLE, INITIAL ENCOUNTER: ICD-10-CM

## 2019-06-01 DIAGNOSIS — M25.511 ACUTE PAIN OF RIGHT SHOULDER: ICD-10-CM

## 2019-06-01 DIAGNOSIS — E87.6 HYPOKALEMIA: ICD-10-CM

## 2019-06-01 LAB
ALBUMIN SERPL-MCNC: 3.7 G/DL (ref 3.4–5)
ALBUMIN/GLOB SERPL: 1.1 {RATIO} (ref 0.8–1.7)
ALP SERPL-CCNC: 59 U/L (ref 45–117)
ALT SERPL-CCNC: 25 U/L (ref 13–56)
ANION GAP SERPL CALC-SCNC: 9 MMOL/L (ref 3–18)
AST SERPL-CCNC: 27 U/L (ref 15–37)
BASOPHILS # BLD: 0 K/UL (ref 0–0.1)
BASOPHILS NFR BLD: 0 % (ref 0–2)
BILIRUB SERPL-MCNC: 0.2 MG/DL (ref 0.2–1)
BUN SERPL-MCNC: 24 MG/DL (ref 7–18)
BUN/CREAT SERPL: 18 (ref 12–20)
CALCIUM SERPL-MCNC: 9.4 MG/DL (ref 8.5–10.1)
CHLORIDE SERPL-SCNC: 100 MMOL/L (ref 100–108)
CO2 SERPL-SCNC: 29 MMOL/L (ref 21–32)
CREAT SERPL-MCNC: 1.36 MG/DL (ref 0.6–1.3)
DIFFERENTIAL METHOD BLD: ABNORMAL
EOSINOPHIL # BLD: 0.2 K/UL (ref 0–0.4)
EOSINOPHIL NFR BLD: 3 % (ref 0–5)
ERYTHROCYTE [DISTWIDTH] IN BLOOD BY AUTOMATED COUNT: 14.6 % (ref 11.6–14.5)
GLOBULIN SER CALC-MCNC: 3.4 G/DL (ref 2–4)
GLUCOSE SERPL-MCNC: 157 MG/DL (ref 74–99)
HCT VFR BLD AUTO: 31.7 % (ref 35–45)
HGB BLD-MCNC: 10.5 G/DL (ref 12–16)
INR PPP: 1 (ref 0.8–1.2)
LYMPHOCYTES # BLD: 1.7 K/UL (ref 0.9–3.6)
LYMPHOCYTES NFR BLD: 21 % (ref 21–52)
MCH RBC QN AUTO: 27 PG (ref 24–34)
MCHC RBC AUTO-ENTMCNC: 33.1 G/DL (ref 31–37)
MCV RBC AUTO: 81.5 FL (ref 74–97)
MONOCYTES # BLD: 0.5 K/UL (ref 0.05–1.2)
MONOCYTES NFR BLD: 6 % (ref 3–10)
NEUTS SEG # BLD: 5.7 K/UL (ref 1.8–8)
NEUTS SEG NFR BLD: 70 % (ref 40–73)
PLATELET # BLD AUTO: 183 K/UL (ref 135–420)
PMV BLD AUTO: 9.4 FL (ref 9.2–11.8)
POTASSIUM SERPL-SCNC: 3.2 MMOL/L (ref 3.5–5.5)
PROT SERPL-MCNC: 7.1 G/DL (ref 6.4–8.2)
PROTHROMBIN TIME: 13.2 SEC (ref 11.5–15.2)
RBC # BLD AUTO: 3.89 M/UL (ref 4.2–5.3)
SODIUM SERPL-SCNC: 138 MMOL/L (ref 136–145)
WBC # BLD AUTO: 8.1 K/UL (ref 4.6–13.2)

## 2019-06-01 PROCEDURE — 85025 COMPLETE CBC W/AUTO DIFF WBC: CPT

## 2019-06-01 PROCEDURE — 73090 X-RAY EXAM OF FOREARM: CPT

## 2019-06-01 PROCEDURE — 80053 COMPREHEN METABOLIC PANEL: CPT

## 2019-06-01 PROCEDURE — 74011250637 HC RX REV CODE- 250/637: Performed by: EMERGENCY MEDICINE

## 2019-06-01 PROCEDURE — 73030 X-RAY EXAM OF SHOULDER: CPT

## 2019-06-01 PROCEDURE — 93005 ELECTROCARDIOGRAM TRACING: CPT

## 2019-06-01 PROCEDURE — 70450 CT HEAD/BRAIN W/O DYE: CPT

## 2019-06-01 PROCEDURE — 99284 EMERGENCY DEPT VISIT MOD MDM: CPT

## 2019-06-01 PROCEDURE — 85610 PROTHROMBIN TIME: CPT

## 2019-06-01 RX ORDER — ACETAMINOPHEN 325 MG/1
975 TABLET ORAL ONCE
Status: COMPLETED | OUTPATIENT
Start: 2019-06-01 | End: 2019-06-01

## 2019-06-01 RX ORDER — TRAMADOL HYDROCHLORIDE 50 MG/1
50 TABLET ORAL
Qty: 10 TAB | Refills: 0 | Status: SHIPPED | OUTPATIENT
Start: 2019-06-01 | End: 2019-06-04

## 2019-06-01 RX ORDER — POTASSIUM CHLORIDE 1500 MG/1
20 TABLET, FILM COATED, EXTENDED RELEASE ORAL DAILY
Qty: 5 TAB | Refills: 0 | Status: SHIPPED | OUTPATIENT
Start: 2019-06-01 | End: 2019-06-06

## 2019-06-01 RX ORDER — TRAMADOL HYDROCHLORIDE 50 MG/1
50 TABLET ORAL
Status: COMPLETED | OUTPATIENT
Start: 2019-06-01 | End: 2019-06-01

## 2019-06-01 RX ADMIN — TRAMADOL HYDROCHLORIDE 50 MG: 50 TABLET, FILM COATED ORAL at 20:47

## 2019-06-01 RX ADMIN — ACETAMINOPHEN 975 MG: 325 TABLET ORAL at 20:26

## 2019-06-01 NOTE — ED TRIAGE NOTES
Pt brought in by [facility] from home after a fall. Per EMS pt slipped on the leg of table, fell onto her right side and hit her head, pt denies any LOC. Upon arrival no bruising noted to right arm or head, pt c/o 10/10 pain to right shoulder, no distress noted. Will continue to monitor pt's status.

## 2019-06-01 NOTE — LETTER
NOTIFICATION RETURN TO WORK / SCHOOL 
 
6/1/2019 9:58 PM 
 
Ms. Mayito Poe [address] [facility] [address] [facility] [id number] To Whom It May Concern: 
 
Mayito Poe is currently under the care of  [facility]. She is advised not to take any long trips, such as by plane, train, bus or other vehicle. She is not to lift any heavy objects for 1 week, and must follow-up with orthopedic specialist for further instruction. If there are questions or concerns please have the patient contact our office. Sincerely, 
 
 
Dr. Rose Liu

## 2019-06-01 NOTE — ED PROVIDER NOTES
EMERGENCY DEPARTMENT HISTORY AND PHYSICAL EXAM    7:12 PM      Date: 6/1/2019  Patient Name: Germaine Sanders    History of Presenting Illness     Chief Complaint   Patient presents with   [facility] Fall    Shoulder Pain         History Provided By: Patient    Additional History (Context): Germaine Sanders is a 78 y.o. female with diabetes, hypertension and And GERD who presents with chief complaint of fall prior to arrival to the ED. Patient states that he tripped on the table leg and slipped and fell on her right shoulder, and may have hit her head. She admits that she is on Eliquis. She complains of severe 10 out of 10 pain in the right shoulder. She denies headache, dizziness, nausea, chest pain, palpitations, abdominal pain, hip pain, back pain, neck pain, weakness or numbness. No other complaints.     PCP: Kamryn Lucio MD        Past History     Past Medical History:  Past Medical History:   Diagnosis Date    Acid reflux     Cancer ([address]     uterine    Diabetes ([address]     Dizzy spells     Essential hypertension     Gallbladder attack     Hearing loss     High cholesterol     Hypertension     Memory loss     Sinus complaint     SOB (shortness of breath)     Thyroid disease        Past Surgical History:  Past Surgical History:   Procedure Laterality Date    HX APPENDECTOMY      HX CATARACT REMOVAL      HX CHOLECYSTECTOMY      HX HYSTERECTOMY      partial hysterectomy    HX ORTHOPAEDIC      5 back surgeries    HX ORTHOPAEDIC      hand arm and left knee    NEUROLOGICAL PROCEDURE UNLISTED  1993    Compression Fracture Surgery    NEUROLOGICAL PROCEDURE UNLISTED  1994    NEUROLOGICAL PROCEDURE UNLISTED  1995       Family History:  Family History   Problem Relation Age of Onset    Cancer Mother 76        Cervical Cancer    Asthma Father     Heart Attack Father     Cancer Maternal Grandmother         colon cancer    Diabetes Maternal Grandmother     Hypertension Maternal Grandmother  Breast Cancer Daughter         two daughters    Diabetes Paternal Grandmother     Heart Disease Paternal Grandmother     Other Maternal Aunt         mental illness due to accident during infancy       Social History:  Social History     Tobacco Use    Smoking status: Never Smoker    Smokeless tobacco: Never Used   Substance Use Topics    Alcohol use: No     Alcohol/week: 0.0 oz    Drug use: No       Allergies: Allergies   Allergen Reactions    Codeine Swelling    Codeine Other (comments)     Severe swelling    Feldene [Piroxicam] Swelling     Severe swelling of hands, face and feet. Review of Systems       Review of Systems   Constitutional: Negative for chills and fever. HENT: Negative for congestion, rhinorrhea, sore throat and trouble swallowing. Eyes: Negative for visual disturbance. Respiratory: Negative for cough, shortness of breath and wheezing. Cardiovascular: Negative for chest pain and palpitations. Gastrointestinal: Negative for abdominal pain, nausea and vomiting. Endocrine: Negative for polyuria. Genitourinary: Negative for difficulty urinating, dysuria and flank pain. Musculoskeletal: Positive for arthralgias (.Right shoulder pain). Negative for back pain, gait problem, neck pain and neck stiffness. Skin: Negative for pallor and rash. Neurological: Negative for dizziness, syncope, weakness, numbness and headaches. Hematological: Does not bruise/bleed easily. Psychiatric/Behavioral: Negative for confusion and dysphoric mood. All other systems reviewed and are negative. Physical Exam     Visit Vitals  BP (!) 114/92   Pulse 72   Temp 98.4 °F (36.9 °C)   Resp 18   Ht 5' 1\" (1.549 m)   Wt 63.5 kg (140 lb)   SpO2 100%   BMI 26.45 kg/m²         Physical Exam   Constitutional: She is oriented to person, place, and time. She appears well-developed and well-nourished. No distress. HENT:   Head: Normocephalic.    Mouth/Throat: Oropharynx is clear and moist.   Eyes: Pupils are equal, round, and reactive to light. Conjunctivae are normal. No scleral icterus. Neck: Normal range of motion. Neck supple. Cardiovascular: Normal rate and intact distal pulses. Capillary refill < 3 seconds   Pulmonary/Chest: Effort normal and breath sounds normal. No respiratory distress. She has no wheezes. Abdominal: Soft. Bowel sounds are normal. She exhibits no distension. There is no tenderness. Musculoskeletal: Normal range of motion. She exhibits no edema. Tenderness anterior and superior right shoulder  Tenderness right trapezius  No midline spinal tenderness  No hip tenderness  Has full range of motion bilateral lower extremities  No other bony tenderness   Lymphadenopathy:     She has no cervical adenopathy. Neurological: She is alert and oriented to person, place, and time. No cranial nerve deficit. She exhibits normal muscle tone. Coordination normal.   Sensation intact  Strength 5 out of 5 with pain limitation with moving left shoulder  No slurred speech  No facial droop  EOMI   Skin: Skin is warm and dry. She is not diaphoretic. Psychiatric: She has a normal mood and affect. Her behavior is normal.   Nursing note and vitals reviewed. Diagnostic Study Results     Labs -  Recent Results (from the past 12 hour(s))   EKG, 12 LEAD, INITIAL    Collection Time: 06/01/19  6:18 PM   Result Value Ref Range    Ventricular Rate 70 BPM    Atrial Rate 70 BPM    QRS Duration 90 ms    Q-T Interval 432 ms    QTC Calculation (Bezet) 466 ms    Calculated R Axis 3 degrees    Calculated T Axis 33 degrees    Diagnosis       Accelerated Junctional rhythm  Septal infarct , age undetermined  Abnormal ECG  When compared with ECG of 28-FEB-2019 15:19,  Junctional rhythm has replaced Atrial fibrillation  Vent.  rate has decreased BY  44 BPM  Septal infarct is now present  Nonspecific T wave abnormality no longer evident in Lateral leads     CBC WITH AUTOMATED DIFF    Collection Time: 06/01/19  6:41 PM   Result Value Ref Range    WBC 8.1 4.6 - 13.2 K/uL    RBC 3.89 (L) 4.20 - 5.30 M/uL    HGB 10.5 (L) 12.0 - 16.0 g/dL    HCT 31.7 (L) 35.0 - 45.0 %    MCV 81.5 74.0 - 97.0 FL    MCH 27.0 24.0 - 34.0 PG    MCHC 33.1 31.0 - 37.0 g/dL    RDW 14.6 (H) 11.6 - 14.5 %    PLATELET 310 591 - 493 K/uL    MPV 9.4 9.2 - 11.8 FL    NEUTROPHILS 70 40 - 73 %    LYMPHOCYTES 21 21 - 52 %    MONOCYTES 6 3 - 10 %    EOSINOPHILS 3 0 - 5 %    BASOPHILS 0 0 - 2 %    ABS. NEUTROPHILS 5.7 1.8 - 8.0 K/UL    ABS. LYMPHOCYTES 1.7 0.9 - 3.6 K/UL    ABS. MONOCYTES 0.5 0.05 - 1.2 K/UL    ABS. EOSINOPHILS 0.2 0.0 - 0.4 K/UL    ABS. BASOPHILS 0.0 0.0 - 0.1 K/UL    DF AUTOMATED     METABOLIC PANEL, COMPREHENSIVE    Collection Time: 06/01/19  6:41 PM   Result Value Ref Range    Sodium 138 136 - 145 mmol/L    Potassium 3.2 (L) 3.5 - 5.5 mmol/L    Chloride 100 100 - 108 mmol/L    CO2 29 21 - 32 mmol/L    Anion gap 9 3.0 - 18 mmol/L    Glucose 157 (H) 74 - 99 mg/dL    BUN 24 (H) 7.0 - 18 MG/DL    Creatinine 1.36 (H) 0.6 - 1.3 MG/DL    BUN/Creatinine ratio 18 12 - 20      GFR est AA 45 (L) >60 ml/min/1.73m2    GFR est non-AA 37 (L) >60 ml/min/1.73m2    Calcium 9.4 8.5 - 10.1 MG/DL    Bilirubin, total 0.2 0.2 - 1.0 MG/DL    ALT (SGPT) 25 13 - 56 U/L    AST (SGOT) 27 15 - 37 U/L    Alk. phosphatase 59 45 - 117 U/L    Protein, total 7.1 6.4 - 8.2 g/dL    Albumin 3.7 3.4 - 5.0 g/dL    Globulin 3.4 2.0 - 4.0 g/dL    A-G Ratio 1.1 0.8 - 1.7     PROTHROMBIN TIME + INR    Collection Time: 06/01/19  6:41 PM   Result Value Ref Range    Prothrombin time 13.2 11.5 - 15.2 sec    INR 1.0 0.8 - 1.2         Radiologic Studies -   XR SHOULDER RT AP/LAT MIN 2 V   Final Result   IMPRESSION:   1. No evidence of acute fracture or dislocation. 2.  Calcific tendinitis of the rotator cuff. 3.  Moderate degenerative changes of the AC joint. XR FOREARM RT AP/LAT   Final Result   IMPRESSION:   1.   No evidence of acute fracture or dislocation. If continued clinical concern,   consider follow-up imaging in 10-14 days. CT HEAD WO CONT   Final Result   IMPRESSION:      1. No evidence of acute intracranial process   2. Mild chronic small vessel ischemic disease               Medical Decision Making   I am the first provider for this patient. I reviewed the vital signs, available nursing notes, past medical history, past surgical history, family history and social history. Vital Signs-Reviewed the patient's vital signs. Pulse Oximetry Analysis -  100 on room air (Interpretation) normal    EKG initially seen by another ED provider. I also reviewed the EKG which shows no ST elevations no ST depressions, no STEMI    Records Reviewed: Nursing Notes and Old Medical Records (Time of Review: 7:12 PM)    Provider Notes (Medical Decision Making): DDX: ICH, CHI, shoulder fracture, dislocation, sprain, contusion    Neuro grossly intact, patient is on Eliquis    CT brain x-rays, analgesia  MDM    Medications   acetaminophen (TYLENOL) tablet 975 mg (975 mg Oral Given 6/1/19 2026)   traMADol (ULTRAM) tablet 50 mg (50 mg Oral Given 6/1/19 2047)           ED Course: Progress Notes, Reevaluation, and Consults:  Nothing acute on CT brain or x-rays. Patient feeling better after pain medication      We will place her in a sling, and have her follow-up with Dr. Priya Gaytan post sling: neurovascular intact    I have reassessed the patient. I have discussed the workup, results and plan with the patient and patient is in agreement. Patient is feeling better. Patient will be prescribed tramadol, potassium. Patient was discharge in stable condition. Patient was given outpatient follow up. Patient is to return to emergency department if any new or worsening condition. Diagnosis     Clinical Impression:   1. Fall, initial encounter    2. Acute pain of right shoulder    3. Strain of right trapezius muscle, initial encounter    4.  Closed head injury, initial encounter    5. Hypokalemia        Disposition: Discharged    Follow-up Information     Follow up With Specialties Details Why Contact Info    Sundar Rodgers MD Orthopedic Surgery Schedule an appointment as soon as possible for a visit in 2 days  [address] [facility] [address] [facility]      Kamryn Lucio MD Family Practice Schedule an appointment as soon as possible for a visit in 3 days  [address] [facility]      [facility] EMERGENCY DEPT Emergency Medicine  As needed, If symptoms worsen [address] [facility] [address]  [phone]           Patient's Medications   Start Taking    POTASSIUM CHLORIDE SR (K-TAB) 20 MEQ TABLET    Take 1 Tab by mouth daily for 5 days. Indications: low amount of potassium in the blood    TRAMADOL (ULTRAM) 50 MG TABLET    Take 1 Tab by mouth every six (6) hours as needed for Pain for up to 3 days. Max Daily Amount: 200 mg. Continue Taking    ALENDRONATE (FOSAMAX) 70 MG TABLET    TAKE 1 TABLET EVERY 7 DAYS    AMLODIPINE (NORVASC) 5 MG TABLET    TAKE 1 TABLET DAILY    APIXABAN (ELIQUIS) 5 MG TABLET    Take 1 Tab by mouth two (2) times a day. DILTIAZEM CD (CARDIZEM CD) 120 MG ER CAPSULE    Take 1 Cap by mouth daily. FUROSEMIDE (LASIX) 40 MG TABLET    Take 1 Tab by mouth daily. Takes 40 mg every morning    GABAPENTIN (NEURONTIN) 300 MG CAPSULE    TAKE 1 CAPSULE NIGHTLY    LANCETS MISC    Precision X-tra. Check 1-2 times a day    LEVOTHYROXINE (SYNTHROID) 125 MCG TABLET    Take 125 mcg by mouth every other day. LEVOTHYROXINE (SYNTHROID) 137 MCG TABLET    Take 137 mcg by mouth Daily (before breakfast). Take one tablet every other day, alternating with levothyroxine 125mcg    MAGNESIUM OXIDE (MAG-OX) 400 MG TABLET    Take 1 Tab by mouth two (2) times a day.     METFORMIN (GLUCOPHAGE) 1,000 MG TABLET    TAKE 1 TABLET TWICE A DAY WITH MEALS    PRAMIPEXOLE (MIRAPEX) 0.5 MG TABLET    TAKE 1 TABLET THREE TIMES A DAY    PRECISION XTRA TEST STRIP    USE TO CHECK 1 TO 2 TIMES DAILY    PREDNISONE (DELTASONE) 20 MG TABLET    Take 20 mg by mouth daily as needed for Other (for gout flares). RANITIDINE (ZANTAC) 150 MG TABLET    Take 1 Tab by mouth two (2) times a day. SIMVASTATIN (ZOCOR) 20 MG TABLET    TAKE 1 TABLET NIGHTLY    SITAGLIPTIN (JANUVIA) 50 MG TABLET    Take 50 mg by mouth daily. TIZANIDINE (ZANAFLEX) 4 MG CAPSULE    Take 4 mg by mouth nightly. VALSARTAN (DIOVAN) 160 MG TABLET    TAKE 1 TABLET DAILY   These Medications have changed    No medications on file   Stop Taking    No medications on file         Elise Gaxiola DO    Dragon medical dictation software was used for portions of this report. Unintended transcription errors may occur. My signature above authenticates this document and my orders, the final    diagnosis (es), discharge prescription (s), and instructions in the Epic    record.

## 2019-06-02 LAB
ATRIAL RATE: 70 BPM
CALCULATED R AXIS, ECG10: 3 DEGREES
CALCULATED T AXIS, ECG11: 33 DEGREES
DIAGNOSIS, 93000: NORMAL
Q-T INTERVAL, ECG07: 432 MS
QRS DURATION, ECG06: 90 MS
QTC CALCULATION (BEZET), ECG08: 466 MS
VENTRICULAR RATE, ECG03: 70 BPM

## 2019-06-02 NOTE — DISCHARGE INSTRUCTIONS
Patient Education     If you were prescribed any medication take as directed. Do not drive or use heavy equipment if prescribed narcotics. Follow up with your primary care physician or with specialist as directed. Return to the emergency room with any new or worsening conditions. Preventing Falls: Care Instructions  Your Care Instructions    Getting around your home safely can be a challenge if you have injuries or health problems that make it easy for you to fall. Loose rugs and furniture in walkways are among the dangers for many older people who have problems walking or who have poor eyesight. People who have conditions such as arthritis, osteoporosis, or dementia also have to be careful not to fall. You can make your home safer with a few simple measures. Follow-up care is a key part of your treatment and safety. Be sure to make and go to all appointments, and call your doctor if you are having problems. It's also a good idea to know your test results and keep a list of the medicines you take. How can you care for yourself at home? Taking care of yourself  · You may get dizzy if you do not drink enough water. To prevent dehydration, drink plenty of fluids, enough so that your urine is light yellow or clear like water. Choose water and other caffeine-free clear liquids. If you have kidney, heart, or liver disease and have to limit fluids, talk with your doctor before you increase the amount of fluids you drink. · Exercise regularly to improve your strength, muscle tone, and balance. Walk if you can. Swimming may be a good choice if you cannot walk easily. · Have your vision and hearing checked each year or any time you notice a change. If you have trouble seeing and hearing, you might not be able to avoid objects and could lose your balance. · Know the side effects of the medicines you take. Ask your doctor or pharmacist whether the medicines you take can affect your balance.  Sleeping pills or sedatives can affect your balance. · Limit the amount of alcohol you drink. Alcohol can impair your balance and other senses. · Ask your doctor whether calluses or corns on your feet need to be removed. If you wear loose-fitting shoes because of calluses or corns, you can lose your balance and fall. · Talk to your doctor if you have numbness in your feet. Preventing falls at home  · Remove raised doorway thresholds, throw rugs, and clutter. Repair loose carpet or raised areas in the floor. · Move furniture and electrical cords to keep them out of walking paths. · Use nonskid floor wax, and wipe up spills right away, especially on ceramic tile floors. · If you use a walker or cane, put rubber tips on it. If you use crutches, clean the bottoms of them regularly with an abrasive pad, such as steel wool. · Keep your house well lit, especially Nicky Allis, and outside walkways. Use night-lights in areas such as hallways and bathrooms. Add extra light switches or use remote switches (such as switches that go on or off when you clap your hands) to make it easier to turn lights on if you have to get up during the night. · Install sturdy handrails on stairways. · Move items in your cabinets so that the things you use a lot are on the lower shelves (about waist level). · Keep a cordless phone and a flashlight with new batteries by your bed. If possible, put a phone in each of the main rooms of your house, or carry a cell phone in case you fall and cannot reach a phone. Or, you can wear a device around your neck or wrist. You push a button that sends a signal for help. · Wear low-heeled shoes that fit well and give your feet good support. Use footwear with nonskid soles. Check the heels and soles of your shoes for wear. Repair or replace worn heels or soles. · Do not wear socks without shoes on wood floors. · Walk on the grass when the sidewalks are slippery.  If you live in an area that gets snow and ice in the winter, sprinkle salt on slippery steps and sidewalks. Preventing falls in the bath  · Install grab bars and nonskid mats inside and outside your shower or tub and near the toilet and sinks. · Use shower chairs and bath benches. · Use a hand-held shower head that will allow you to sit while showering. · Get into a tub or shower by putting the weaker leg in first. Get out of a tub or shower with your strong side first.  · Repair loose toilet seats and consider installing a raised toilet seat to make getting on and off the toilet easier. · Keep your bathroom door unlocked while you are in the shower. Where can you learn more? Go to http://nathalia-kam.info/. Enter 0476 [id number] in the search box to learn more about \"Preventing Falls: Care Instructions. \"  Current as of: March 16, 2018  Content Version: 11.8  © 1768-8187 [employer]. Care instructions adapted under license by [employer] (which disclaims liability or warranty for this information). If you have questions about a medical condition or this instruction, always ask your healthcare professional. [place] [address] any warranty or liability for your use of this information. Patient Education        Learning About a Closed Head Injury  What is a closed head injury? A closed head injury happens when your head gets hit hard. The strong force of the blow causes your brain to shake in your skull. This movement can cause the brain to bruise, swell, or tear. Sometimes nerves or blood vessels also get damaged. This can cause bleeding in or around the brain. A concussion is a type of closed head injury. What are the symptoms? If you have a mild concussion, you may have a mild headache or feel \"not quite right. \" These symptoms are common. They usually go away over a few days to 4 weeks. But sometimes after a concussion, you feel like you can't function as well as before the injury.  And you have new symptoms. This is called postconcussive syndrome. You may:  · Find it harder to solve problems, think, concentrate, or remember. · Have headaches. · Have changes in your sleep patterns, such as not being able to sleep or sleeping all the time. · Have changes in your personality. · Not be interested in your usual activities. · Feel angry or anxious without a clear reason. · Lose your sense of taste or smell. · Be dizzy, lightheaded, or unsteady. It may be hard to stand or walk. How is a closed head injury treated? Any person who may have a concussion needs to see a doctor. Some people have to stay in the hospital to be watched. Others can go home safely. If you go home, follow your doctor's instructions. He or she will tell you if you need someone to watch you closely for the next 24 hours or longer. Rest is the best treatment. Get plenty of sleep at night. And try to rest during the day. · Avoid activities that are physically or mentally demanding. These include housework, exercise, and schoolwork. And don't play video games, send text messages, or use the computer. You may need to change your school or work schedule to be able to avoid these activities. · Ask your doctor when it's okay to drive, ride a bike, or operate machinery. · Take an over-the-counter pain medicine, such as acetaminophen (Tylenol), ibuprofen (Advil, Motrin), or naproxen (Aleve). Be safe with medicines. Read and follow all instructions on the label. · Check with your doctor before you use any other medicines for pain. · Do not drink alcohol or use illegal drugs. They can slow recovery. They can also increase your risk of getting a second head injury. Follow-up care is a key part of your treatment and safety. Be sure to make and go to all appointments, and call your doctor if you are having problems. It's also a good idea to know your test results and keep a list of the medicines you take. Where can you learn more?   Go to http://nathalia-kam.info/. Enter E235 in the search box to learn more about \"Learning About a Closed Head Injury. \"  Current as of: Raquel 3, 2018  Content Version: 11.9  © 2006-2018 [employer]. Care instructions adapted under license by [employer] (which disclaims liability or warranty for this information). If you have questions about a medical condition or this instruction, always ask your healthcare professional. [place] [address] any warranty or liability for your use of this information. Patient Education        Shoulder Pain: Care Instructions  Your Care Instructions    You can hurt your shoulder by using it too much during an activity, such as fishing or baseball. It can also happen as part of the everyday wear and tear of getting older. Shoulder injuries can be slow to heal, but your shoulder should get better with time. Your doctor may recommend a sling to rest your shoulder. If you have injured your shoulder, you may need testing and treatment. Follow-up care is a key part of your treatment and safety. Be sure to make and go to all appointments, and call your doctor if you are having problems. It's also a good idea to know your test results and keep a list of the medicines you take. How can you care for yourself at home? · Take pain medicines exactly as directed. ? If the doctor gave you a prescription medicine for pain, take it as prescribed. ? If you are not taking a prescription pain medicine, ask your doctor if you can take an over-the-counter medicine. ? Do not take two or more pain medicines at the same time unless the doctor told you to. Many pain medicines contain acetaminophen, which is Tylenol. Too much acetaminophen (Tylenol) can be harmful. · If your doctor recommends that you wear a sling, use it as directed. Do not take it off before your doctor tells you to.   · Put ice or a cold pack on the sore area for 10 to 20 minutes at a time. Put a thin cloth between the ice and your skin. · If there is no swelling, you can put moist heat, a heating pad, or a warm cloth on your shoulder. Some doctors suggest alternating between hot and cold. · Rest your shoulder for a few days. If your doctor recommends it, you can then begin gentle exercise of the shoulder, but do not lift anything heavy. When should you call for help? Call 911 anytime you think you may need emergency care. For example, call if:    · You have chest pain or pressure. This may occur with:  ? Sweating. ? Shortness of breath. ? Nausea or vomiting. ? Pain that spreads from the chest to the neck, jaw, or one or both shoulders or arms. ? Dizziness or lightheadedness. ? A fast or uneven pulse. After calling 911, chew 1 adult-strength aspirin. Wait for an ambulance. Do not try to drive yourself.     · Your arm or hand is cool or pale or changes color.    Call your doctor now or seek immediate medical care if:    · You have signs of infection, such as:  ? Increased pain, swelling, warmth, or redness in your shoulder. ? Red streaks leading from a place on your shoulder. ? Pus draining from an area of your shoulder. ? Swollen lymph nodes in your neck, armpits, or groin. ? A fever.    Watch closely for changes in your health, and be sure to contact your doctor if:    · You cannot use your shoulder.     · Your shoulder does not get better as expected. Where can you learn more? Go to http://nathalia-kam.info/. Enter C580 in the search box to learn more about \"Shoulder Pain: Care Instructions. \"  Current as of: September 20, 2018  Content Version: 11.9  © 2434-9450 [employer]. Care instructions adapted under license by [employer] (which disclaims liability or warranty for this information).  If you have questions about a medical condition or this instruction, always ask your healthcare professional. Anel Harrell disclaims any warranty or liability for your use of this information.

## 2019-06-07 ENCOUNTER — APPOINTMENT (OUTPATIENT)
Dept: GENERAL RADIOLOGY | Age: 79
End: 2019-06-07
Attending: PHYSICIAN ASSISTANT
Payer: MEDICARE

## 2019-06-07 ENCOUNTER — HOSPITAL ENCOUNTER (EMERGENCY)
Age: 79
Discharge: HOME | End: 2019-06-07
Attending: EMERGENCY MEDICINE
Payer: MEDICARE

## 2019-06-07 VITALS
SYSTOLIC BLOOD PRESSURE: 124 MMHG | HEIGHT: 60 IN | DIASTOLIC BLOOD PRESSURE: 77 MMHG | BODY MASS INDEX: 26.5 KG/M2 | HEART RATE: 98 BPM | WEIGHT: 135 LBS | OXYGEN SATURATION: 99 % | TEMPERATURE: 98.8 F | RESPIRATION RATE: 16 BRPM
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
## 2019-06-07 DIAGNOSIS — M79.641 PAIN OF RIGHT HAND: ICD-10-CM

## 2019-06-07 PROCEDURE — 73130 X-RAY EXAM OF HAND: CPT

## 2019-06-07 PROCEDURE — 99283 EMERGENCY DEPT VISIT LOW MDM: CPT

## 2019-06-07 PROCEDURE — 74011250637 HC RX REV CODE- 250/637: Performed by: PHYSICIAN ASSISTANT

## 2019-06-07 RX ORDER — TRAMADOL HYDROCHLORIDE 50 MG/1
50 TABLET ORAL
Qty: 20 TAB | Refills: 0 | Status: SHIPPED | OUTPATIENT
Start: 2019-06-07 | End: 2019-07-07

## 2019-06-07 RX ORDER — ACETAMINOPHEN 325 MG/1
650 TABLET ORAL
Status: COMPLETED | OUTPATIENT
Start: 2019-06-07 | End: 2019-06-07

## 2019-06-07 RX ORDER — TRAMADOL HYDROCHLORIDE 50 MG/1
50 TABLET ORAL
Status: COMPLETED | OUTPATIENT
Start: 2019-06-07 | End: 2019-06-07

## 2019-06-07 RX ADMIN — TRAMADOL HYDROCHLORIDE 50 MG: 50 TABLET, COATED ORAL at 19:43

## 2019-06-07 RX ADMIN — ACETAMINOPHEN 650 MG: 325 TABLET ORAL at 19:43

## 2019-06-07 NOTE — ED TRIAGE NOTES
Pt states \" I fell Last Saturday and was seen and had Xray and treated for shoulder injury but now my hand is swollen very bad\". Noted swelling to right hand.  Pt ambulatory in triage

## 2019-06-08 NOTE — ED PROVIDER NOTES
EMERGENCY DEPARTMENT HISTORY AND PHYSICAL EXAM    Date: 6/7/2019  Patient Name: Taz Ponce    History of Presenting Illness     Chief Complaint   Patient presents with    Hand Pain         History Provided By: patient    Chief Complaint: fall, hand pain  Duration: 1 week  Timing:acute  Location: R hand  Quality:aching  Severity:moderate  Modifying Factors: none   Associated Symptoms: swelling       Additional History (Context): Taz Ponce is a 78 y.o. female with PMH DM, htn, and thyroid disease who presents with c/o R hand pain and swelling since her fall 1 week ago. Pt was seen in the ED after the fall and had imaging of her R shoulder done. She was placed in a sling and referred to ortho, but now reports R hand pain and swelling. Pt concerned as she did not have imaging of her hand at the time of the fall. No other complaints. PCP: Martine Mack MD    Current Facility-Administered Medications   Medication Dose Route Frequency Provider Last Rate Last Dose    acetaminophen (TYLENOL) tablet 650 mg  650 mg Oral NOW [facility]        traMADol Mirela Ly) tablet 50 mg  50 mg Oral NOW [facility] [place], [place]         Current Outpatient Medications   Medication Sig Dispense Refill    traMADol (ULTRAM) 50 mg tablet Take 1 Tab by mouth every six (6) hours as needed for Pain for up to 30 days. Max Daily Amount: 200 mg. 20 Tab 0    levothyroxine (SYNTHROID) 125 mcg tablet Take 125 mcg by mouth every other day.  simvastatin (ZOCOR) 20 mg tablet TAKE 1 TABLET NIGHTLY 90 Tab 0    apixaban (ELIQUIS) 5 mg tablet Take 1 Tab by mouth two (2) times a day. 60 Tab 1    dilTIAZem CD (CARDIZEM CD) 120 mg ER capsule Take 1 Cap by mouth daily. 30 Cap 1    magnesium oxide (MAG-OX) 400 mg tablet Take 1 Tab by mouth two (2) times a day. 60 Tab 1    levothyroxine (SYNTHROID) 137 mcg tablet Take 137 mcg by mouth Daily (before breakfast).  Take one tablet every other day, alternating with levothyroxine 125mcg      SITagliptin (JANUVIA) 50 mg tablet Take 50 mg by mouth daily.  predniSONE (DELTASONE) 20 mg tablet Take 20 mg by mouth daily as needed for Other (for gout flares).  valsartan (DIOVAN) 160 mg tablet TAKE 1 TABLET DAILY 90 Tab 0    amLODIPine (NORVASC) 5 mg tablet TAKE 1 TABLET DAILY 90 Tab 1    metFORMIN (GLUCOPHAGE) 1,000 mg tablet TAKE 1 TABLET TWICE A DAY WITH MEALS 180 Tab 0    alendronate (FOSAMAX) 70 mg tablet TAKE 1 TABLET EVERY 7 DAYS 12 Tab 0    gabapentin (NEURONTIN) 300 mg capsule TAKE 1 CAPSULE NIGHTLY 30 Cap 0    raNITIdine (ZANTAC) 150 mg tablet Take 1 Tab by mouth two (2) times a day. 180 Tab 1    PRECISION XTRA TEST strip USE TO CHECK 1 TO 2 TIMES DAILY 100 Strip 6    pramipexole (MIRAPEX) 0.5 mg tablet TAKE 1 TABLET THREE TIMES A  Tab 0    furosemide (LASIX) 40 mg tablet Take 1 Tab by mouth daily. Takes 40 mg every morning (Patient taking differently: Take 40 mg by mouth daily as needed. Takes 40 mg every morning) 30 Tab 0    tiZANidine (ZANAFLEX) 4 mg capsule Take 4 mg by mouth nightly.  Lancets misc Precision X-tra.  Check 1-2 times a day 100 Each 6       Past History     Past Medical History:  Past Medical History:   Diagnosis Date    Acid reflux     Cancer ([address]     uterine    Diabetes ([address]     Dizzy spells     Essential hypertension     Gallbladder attack     Hearing loss     High cholesterol     Hypertension     Memory loss     Sinus complaint     SOB (shortness of breath)     Thyroid disease        Past Surgical History:  Past Surgical History:   Procedure Laterality Date    HX APPENDECTOMY      HX CATARACT REMOVAL      HX CHOLECYSTECTOMY      HX HYSTERECTOMY      partial hysterectomy    HX ORTHOPAEDIC      5 back surgeries    HX ORTHOPAEDIC      hand arm and left knee    NEUROLOGICAL PROCEDURE UNLISTED  1993    Compression Fracture Surgery    NEUROLOGICAL PROCEDURE UNLISTED  1994    NEUROLOGICAL PROCEDURE UNLISTED 1995       Family History:  Family History   Problem Relation Age of Onset    Cancer Mother 76        Cervical Cancer    Asthma Father     Heart Attack Father     Cancer Maternal Grandmother         colon cancer    Diabetes Maternal Grandmother     Hypertension Maternal Grandmother     Breast Cancer Daughter         two daughters    Diabetes Paternal Grandmother     Heart Disease Paternal Grandmother     Other Maternal Aunt         mental illness due to accident during infancy       Social History:  Social History     Tobacco Use    Smoking status: Never Smoker    Smokeless tobacco: Never Used   Substance Use Topics    Alcohol use: No     Alcohol/week: 0.0 oz    Drug use: No       Allergies: Allergies   Allergen Reactions    Codeine Swelling    Codeine Other (comments)     Severe swelling    Feldene [Piroxicam] Swelling     Severe swelling of hands, face and feet. Review of Systems   Review of Systems   Constitutional: Negative. Negative for chills and fever. HENT: Negative. Negative for congestion, ear pain and rhinorrhea. Eyes: Negative. Negative for pain and redness. Respiratory: Negative. Negative for cough, shortness of breath, wheezing and stridor. Cardiovascular: Negative. Negative for chest pain and leg swelling. Gastrointestinal: Negative. Negative for abdominal pain, constipation, diarrhea, nausea and vomiting. Genitourinary: Negative. Negative for dysuria and frequency. Musculoskeletal: Positive for arthralgias and joint swelling. Negative for back pain and neck pain. Skin: Negative. Negative for rash and wound. Neurological: Negative. Negative for dizziness, seizures, syncope and headaches. All other systems reviewed and are negative.     All Other Systems Negative  Physical Exam     Vitals:    06/07/19 1843   BP: 124/77   Pulse: 98   Resp: 16   Temp: 98.8 °F (37.1 °C)   SpO2: 99%   Weight: 61.2 kg (135 lb)   Height: 5' (1.524 m)     Physical Exam Constitutional: She is oriented to person, place, and time. She appears well-developed and well-nourished. No distress. HENT:   Head: Normocephalic and atraumatic. Eyes: Conjunctivae are normal. Right eye exhibits no discharge. Left eye exhibits no discharge. No scleral icterus. Neck: Normal range of motion. Neck supple. Cardiovascular: Normal rate, regular rhythm and normal heart sounds. Exam reveals no gallop and no friction rub. No murmur heard. Pulmonary/Chest: Effort normal and breath sounds normal. No stridor. No respiratory distress. She has no wheezes. She has no rales. Musculoskeletal: She exhibits edema and tenderness. She exhibits no deformity. RUE: intact pulses cap RF < 3 sec, mild diffuse edema noted to the dorsum of the hand, no deformity noted. TTP noted over the dorsum of the 1st 2nd and 3rd metacarpals. ROM decreased upon flexion of the 1st MCP joint, secondary to prior pin placement. Neurological: She is alert and oriented to person, place, and time. Coordination normal.   Gait is steady. Able to ambulate without difficulty. Skin: Skin is warm and dry. No rash noted. She is not diaphoretic. No erythema. Psychiatric: She has a normal mood and affect. Her behavior is normal. Thought content normal.   Nursing note and vitals reviewed. Diagnostic Study Results     Labs -   No results found for this or any previous visit (from the past 12 hour(s)). Radiologic Studies -   XR HAND RT MIN 3 V    (Results Pending)   hardware  noted to the 1st MCP joint with possible loosening of pins, no definite acute fx noted, DJD  CT Results  (Last 48 hours)    None        CXR Results  (Last 48 hours)    None            Medical Decision Making   I am the first provider for this patient. I reviewed the vital signs, available nursing notes, past medical history, past surgical history, family history and social history.     Vital Signs-Reviewed the patient's vital signs. Records Reviewed: Isabel Arita PA-C     Procedures:  Procedures    Provider Notes (Medical Decision Making): Impression:  Fall, hand pain    X-rays show hardware  noted to the 1st MCP joint with possible loosening of pins, no fx, DJD    Discussed these results with the pt, will plan to treat with ultram and tylenol, with ortho follow-up. Pt agrees. Isabel Arita PA-C 8:08 PM     MED RECONCILIATION:  Current Facility-Administered Medications   Medication Dose Route Frequency    acetaminophen (TYLENOL) tablet 650 mg  650 mg Oral NOW    traMADol (ULTRAM) tablet 50 mg  50 mg Oral NOW     Current Outpatient Medications   Medication Sig    traMADol (ULTRAM) 50 mg tablet Take 1 Tab by mouth every six (6) hours as needed for Pain for up to 30 days. Max Daily Amount: 200 mg.  levothyroxine (SYNTHROID) 125 mcg tablet Take 125 mcg by mouth every other day.  simvastatin (ZOCOR) 20 mg tablet TAKE 1 TABLET NIGHTLY    apixaban (ELIQUIS) 5 mg tablet Take 1 Tab by mouth two (2) times a day.  dilTIAZem CD (CARDIZEM CD) 120 mg ER capsule Take 1 Cap by mouth daily.  magnesium oxide (MAG-OX) 400 mg tablet Take 1 Tab by mouth two (2) times a day.  levothyroxine (SYNTHROID) 137 mcg tablet Take 137 mcg by mouth Daily (before breakfast). Take one tablet every other day, alternating with levothyroxine 125mcg    SITagliptin (JANUVIA) 50 mg tablet Take 50 mg by mouth daily.  predniSONE (DELTASONE) 20 mg tablet Take 20 mg by mouth daily as needed for Other (for gout flares).  valsartan (DIOVAN) 160 mg tablet TAKE 1 TABLET DAILY    amLODIPine (NORVASC) 5 mg tablet TAKE 1 TABLET DAILY    metFORMIN (GLUCOPHAGE) 1,000 mg tablet TAKE 1 TABLET TWICE A DAY WITH MEALS    alendronate (FOSAMAX) 70 mg tablet TAKE 1 TABLET EVERY 7 DAYS    gabapentin (NEURONTIN) 300 mg capsule TAKE 1 CAPSULE NIGHTLY    raNITIdine (ZANTAC) 150 mg tablet Take 1 Tab by mouth two (2) times a day.     PRECISION XTRA TEST strip USE TO CHECK 1 TO 2 TIMES DAILY    pramipexole (MIRAPEX) 0.5 mg tablet TAKE 1 TABLET THREE TIMES A DAY    furosemide (LASIX) 40 mg tablet Take 1 Tab by mouth daily. Takes 40 mg every morning (Patient taking differently: Take 40 mg by mouth daily as needed. Takes 40 mg every morning)    tiZANidine (ZANAFLEX) 4 mg capsule Take 4 mg by mouth nightly.  Lancets misc Precision X-tra. Check 1-2 times a day       Disposition:  D/c    DISCHARGE NOTE:   Patient is stable for discharge at this time. Rx for ultram given. Rest and follow-up with ortho this week. Return to the ED immediately for any new or worsening sx. April SHAYY Arita PA-C 8:09 PM     Follow-up Information     Follow up With Specialties Details Why Contact Info    your orthopedist  Schedule an appointment as soon as possible for a visit in 1 week      [facility] EMERGENCY DEPT Emergency Medicine  As needed, If symptoms worsen [address] [id number]  [phone]          Current Discharge Medication List      START taking these medications    Details   traMADol (ULTRAM) 50 mg tablet Take 1 Tab by mouth every six (6) hours as needed for Pain for up to 30 days. Max Daily Amount: 200 mg. Qty: 20 Tab, Refills: 0    Associated Diagnoses: Pain of right hand                 Diagnosis     Clinical Impression:   1. Fall, subsequent encounter    2.  Pain of right hand

## 2022-03-18 PROBLEM — E11.8 TYPE 2 DIABETES MELLITUS WITH COMPLICATION, WITHOUT LONG-TERM CURRENT USE OF INSULIN (HCC): Status: ACTIVE | Noted: 2017-11-07

## 2022-03-18 PROBLEM — N13.9 URINARY OBSTRUCTION: Status: ACTIVE | Noted: 2019-02-08

## 2022-03-18 PROBLEM — N17.9 AKI (ACUTE KIDNEY INJURY) (HCC): Status: ACTIVE | Noted: 2019-02-08

## 2022-03-19 PROBLEM — I48.91 NEW ONSET A-FIB (HCC): Status: ACTIVE | Noted: 2019-02-28

## 2022-03-19 PROBLEM — Z71.89 ADVANCE DIRECTIVE DISCUSSED WITH PATIENT: Status: ACTIVE | Noted: 2017-02-01

## 2022-03-19 PROBLEM — I48.91 ATRIAL FIBRILLATION WITH RVR (HCC): Status: ACTIVE | Noted: 2019-02-28

## 2022-10-18 ENCOUNTER — TRANSCRIBE ORDER (OUTPATIENT)
Dept: SCHEDULING | Age: 82
End: 2022-10-18

## 2022-10-18 DIAGNOSIS — N18.32 STAGE 3B CHRONIC KIDNEY DISEASE (HCC): Primary | ICD-10-CM

## 2022-12-13 ENCOUNTER — TRANSCRIBE ORDER (OUTPATIENT)
Dept: REGISTRATION | Age: 82
End: 2022-12-13

## 2022-12-13 ENCOUNTER — HOSPITAL ENCOUNTER (OUTPATIENT)
Dept: ULTRASOUND IMAGING | Age: 82
Discharge: HOME | End: 2022-12-13
Attending: INTERNAL MEDICINE
Payer: MEDICARE

## 2022-12-13 ENCOUNTER — HOSPITAL ENCOUNTER (OUTPATIENT)
Dept: LAB | Age: 82
Discharge: HOME | End: 2022-12-13
Attending: INTERNAL MEDICINE
Payer: MEDICARE

## 2022-12-13 DIAGNOSIS — N18.32 STAGE 3B CHRONIC KIDNEY DISEASE (HCC): Primary | ICD-10-CM

## 2022-12-13 DIAGNOSIS — N18.32 STAGE 3B CHRONIC KIDNEY DISEASE (HCC): ICD-10-CM

## 2022-12-13 LAB
ALBUMIN SERPL-MCNC: 3.7 G/DL (ref 3.4–5)
ANION GAP SERPL CALC-SCNC: 9 MMOL/L (ref 3–18)
BUN SERPL-MCNC: 14 MG/DL (ref 7–18)
BUN/CREAT SERPL: 13 (ref 12–20)
CALCIUM SERPL-MCNC: 10 MG/DL (ref 8.5–10.1)
CALCIUM SERPL-MCNC: 10 MG/DL (ref 8.5–10.1)
CHLORIDE SERPL-SCNC: 108 MMOL/L (ref 100–111)
CO2 SERPL-SCNC: 27 MMOL/L (ref 21–32)
CREAT SERPL-MCNC: 1.07 MG/DL (ref 0.6–1.3)
GLUCOSE SERPL-MCNC: 114 MG/DL (ref 74–99)
HBA1C MFR BLD: 6.7 % (ref 4.2–5.6)
PHOSPHATE SERPL-MCNC: 2.7 MG/DL (ref 2.5–4.9)
POTASSIUM SERPL-SCNC: 4.9 MMOL/L (ref 3.5–5.5)
PTH-INTACT SERPL-MCNC: 56.1 PG/ML (ref 18.4–88)
SODIUM SERPL-SCNC: 144 MMOL/L (ref 136–145)
URATE SERPL-MCNC: 7.5 MG/DL (ref 2.6–7.2)

## 2022-12-13 PROCEDURE — 84550 ASSAY OF BLOOD/URIC ACID: CPT

## 2022-12-13 PROCEDURE — 76770 US EXAM ABDO BACK WALL COMP: CPT

## 2022-12-13 PROCEDURE — 84165 PROTEIN E-PHORESIS SERUM: CPT

## 2022-12-13 PROCEDURE — 36415 COLL VENOUS BLD VENIPUNCTURE: CPT

## 2022-12-13 PROCEDURE — 83521 IG LIGHT CHAINS FREE EACH: CPT

## 2022-12-13 PROCEDURE — 80069 RENAL FUNCTION PANEL: CPT

## 2022-12-13 PROCEDURE — 83036 HEMOGLOBIN GLYCOSYLATED A1C: CPT

## 2022-12-13 PROCEDURE — 83970 ASSAY OF PARATHORMONE: CPT

## 2022-12-14 LAB
KAPPA LC FREE SER-MCNC: 45.3 MG/L (ref 3.3–19.4)
KAPPA LC FREE/LAMBDA FREE SER: 1.98 {RATIO} (ref 0.26–1.65)
LAMBDA LC FREE SERPL-MCNC: 22.9 MG/L (ref 5.7–26.3)

## 2022-12-16 LAB
ALBUMIN SERPL ELPH-MCNC: 3.5 G/DL (ref 2.9–4.4)
ALBUMIN/GLOB SERPL: 1.2 {RATIO} (ref 0.7–1.7)
ALPHA1 GLOB SERPL ELPH-MCNC: 0.3 G/DL (ref 0–0.4)
ALPHA2 GLOB SERPL ELPH-MCNC: 0.9 G/DL (ref 0.4–1)
B-GLOBULIN SERPL ELPH-MCNC: 1 G/DL (ref 0.7–1.3)
GAMMA GLOB SERPL ELPH-MCNC: 0.9 G/DL (ref 0.4–1.8)
GLOBULIN SER CALC-MCNC: 3 G/DL (ref 2.2–3.9)
M PROTEIN SERPL ELPH-MCNC: NORMAL G/DL
PROT SERPL-MCNC: 6.5 G/DL (ref 6–8.5)

## 2022-12-19 ENCOUNTER — TRANSCRIBE ORDER (OUTPATIENT)
Dept: SCHEDULING | Age: 82
End: 2022-12-19

## 2022-12-19 DIAGNOSIS — M81.0 AGE-RELATED OSTEOPOROSIS WITHOUT CURRENT PATHOLOGICAL FRACTURE: Primary | ICD-10-CM

## 2023-01-31 DIAGNOSIS — M81.0 AGE-RELATED OSTEOPOROSIS WITHOUT CURRENT PATHOLOGICAL FRACTURE: Primary | ICD-10-CM

## 2023-02-04 DIAGNOSIS — M81.0 AGE-RELATED OSTEOPOROSIS WITHOUT CURRENT PATHOLOGICAL FRACTURE: Primary | ICD-10-CM

## 2023-05-03 ENCOUNTER — OFFICE VISIT (OUTPATIENT)
Age: 83
End: 2023-05-03
Payer: MEDICARE

## 2023-05-03 VITALS
WEIGHT: 133 LBS | BODY MASS INDEX: 27.92 KG/M2 | SYSTOLIC BLOOD PRESSURE: 98 MMHG | HEART RATE: 87 BPM | HEIGHT: 58 IN | DIASTOLIC BLOOD PRESSURE: 57 MMHG

## 2023-05-03 DIAGNOSIS — R00.2 PALPITATIONS: ICD-10-CM

## 2023-05-03 DIAGNOSIS — I48.0 PAROXYSMAL ATRIAL FIBRILLATION (HCC): Primary | ICD-10-CM

## 2023-05-03 DIAGNOSIS — E11.9 TYPE 2 DIABETES MELLITUS WITHOUT COMPLICATION, WITHOUT LONG-TERM CURRENT USE OF INSULIN (HCC): ICD-10-CM

## 2023-05-03 DIAGNOSIS — I10 ESSENTIAL HYPERTENSION: ICD-10-CM

## 2023-05-03 DIAGNOSIS — R60.9 EDEMA, UNSPECIFIED TYPE: ICD-10-CM

## 2023-05-03 PROCEDURE — 93000 ELECTROCARDIOGRAM COMPLETE: CPT | Performed by: INTERNAL MEDICINE

## 2023-05-03 PROCEDURE — G8427 DOCREV CUR MEDS BY ELIG CLIN: HCPCS | Performed by: INTERNAL MEDICINE

## 2023-05-03 PROCEDURE — G8419 CALC BMI OUT NRM PARAM NOF/U: HCPCS | Performed by: INTERNAL MEDICINE

## 2023-05-03 PROCEDURE — 1090F PRES/ABSN URINE INCON ASSESS: CPT | Performed by: INTERNAL MEDICINE

## 2023-05-03 PROCEDURE — 99204 OFFICE O/P NEW MOD 45 MIN: CPT | Performed by: INTERNAL MEDICINE

## 2023-05-03 PROCEDURE — 1036F TOBACCO NON-USER: CPT | Performed by: INTERNAL MEDICINE

## 2023-05-03 PROCEDURE — 3074F SYST BP LT 130 MM HG: CPT | Performed by: INTERNAL MEDICINE

## 2023-05-03 PROCEDURE — G8400 PT W/DXA NO RESULTS DOC: HCPCS | Performed by: INTERNAL MEDICINE

## 2023-05-03 PROCEDURE — 3078F DIAST BP <80 MM HG: CPT | Performed by: INTERNAL MEDICINE

## 2023-05-03 PROCEDURE — 1123F ACP DISCUSS/DSCN MKR DOCD: CPT | Performed by: INTERNAL MEDICINE

## 2023-05-03 RX ORDER — ASCORBIC ACID 500 MG
500 TABLET ORAL DAILY
COMMUNITY

## 2023-05-03 ASSESSMENT — ENCOUNTER SYMPTOMS
EYES NEGATIVE: 1
RESPIRATORY NEGATIVE: 1
GASTROINTESTINAL NEGATIVE: 1

## 2023-05-03 NOTE — PROGRESS NOTES
Kenneth Apodaca is a 78y.o. year old female. 5/3/2023 seen as a new patient for history of atrial fibrillation. Cardiology opinion as she changed PCP. Continues to have palpitations every few days. These last for a few minutes. No associated nausea vomiting shortness of breath or diaphoresis. No significant chest pains. No significant cardiac symptoms when she is not having palpitations. Edema intermittently for years. Improves with diuretics. Has been worsening for 2-3 wks. Ambulation is limited due to back pain and leg pains        Review of Systems   Constitutional: Negative. HENT: Negative. Eyes: Negative. Respiratory: Negative. Cardiovascular:  Positive for palpitations and leg swelling. Gastrointestinal: Negative. Endocrine: Negative. Genitourinary: Negative. Musculoskeletal: Negative. Neurological: Negative. Psychiatric/Behavioral: Negative. All other systems reviewed and are negative. Physical Exam  Vitals and nursing note reviewed. Constitutional:       Appearance: Normal appearance. HENT:      Head: Normocephalic and atraumatic. Nose: Nose normal.   Eyes:      Conjunctiva/sclera: Conjunctivae normal.   Cardiovascular:      Rate and Rhythm: Normal rate and regular rhythm. Pulses: Normal pulses. Heart sounds: Normal heart sounds. Pulmonary:      Effort: Pulmonary effort is normal.      Breath sounds: Normal breath sounds. Abdominal:      General: Bowel sounds are normal.      Palpations: Abdomen is soft. Musculoskeletal:         General: Normal range of motion. Right lower leg: Edema (Puffy but not pitting) present. Left lower leg: Edema (Puffy but not pitting) present. Skin:     General: Skin is warm and dry. Neurological:      General: No focal deficit present. Mental Status: She is alert and oriented to person, place, and time.    Psychiatric:         Mood and Affect: Mood normal.         Behavior: Behavior

## 2023-05-17 ENCOUNTER — HOSPITAL ENCOUNTER (OUTPATIENT)
Facility: HOSPITAL | Age: 83
Discharge: HOME | End: 2023-05-20
Payer: MEDICARE

## 2023-05-17 DIAGNOSIS — M48.00 SPINAL STENOSIS, UNSPECIFIED SPINAL REGION: ICD-10-CM

## 2023-05-17 PROCEDURE — 72040 X-RAY EXAM NECK SPINE 2-3 VW: CPT

## 2023-05-17 PROCEDURE — 72070 X-RAY EXAM THORAC SPINE 2VWS: CPT

## 2023-05-17 PROCEDURE — 72100 X-RAY EXAM L-S SPINE 2/3 VWS: CPT

## 2023-05-30 NOTE — INTERVAL H&P NOTE
H&P Update:  Taz Ponce was seen and examined. History and physical has been reviewed. The patient has been examined.  There have been no significant clinical changes since the completion of the originally dated History and Physical.    Signed By: Ngozi Cifuentes MD     February 21, 2017 10:36 AM Glycopyrrolate Pregnancy And Lactation Text: This medication is Pregnancy Category B and is considered safe during pregnancy. It is unknown if it is excreted breast milk.

## 2023-07-31 ENCOUNTER — HOSPITAL ENCOUNTER (OUTPATIENT)
Facility: HOSPITAL | Age: 83
Discharge: HOME | End: 2023-08-03
Payer: MEDICARE

## 2023-07-31 DIAGNOSIS — E05.90 HYPERTHYROIDISM: ICD-10-CM

## 2023-07-31 DIAGNOSIS — E78.00 HYPERCHOLESTEREMIA: ICD-10-CM

## 2023-07-31 DIAGNOSIS — I48.0 PAROXYSMAL ATRIAL FIBRILLATION (HCC): ICD-10-CM

## 2023-07-31 LAB
ALBUMIN SERPL-MCNC: 4.1 G/DL (ref 3.4–5)
ALBUMIN/GLOB SERPL: 1.4 (ref 0.8–1.7)
ALP SERPL-CCNC: 69 U/L (ref 45–117)
ALT SERPL-CCNC: 15 U/L (ref 13–56)
AST SERPL-CCNC: 15 U/L (ref 10–38)
BILIRUB DIRECT SERPL-MCNC: 0.2 MG/DL (ref 0–0.2)
BILIRUB SERPL-MCNC: 0.8 MG/DL (ref 0.2–1)
CHOLEST SERPL-MCNC: 140 MG/DL
GLOBULIN SER CALC-MCNC: 3 G/DL (ref 2–4)
HDLC SERPL-MCNC: 47 MG/DL (ref 40–60)
HDLC SERPL: 3 (ref 0–5)
LDLC SERPL CALC-MCNC: 71.2 MG/DL (ref 0–100)
LIPID PANEL: NORMAL
PROT SERPL-MCNC: 7.1 G/DL (ref 6.4–8.2)
T4 FREE SERPL-MCNC: 2 NG/DL (ref 0.7–1.5)
TRIGL SERPL-MCNC: 109 MG/DL
TSH SERPL DL<=0.05 MIU/L-ACNC: 0.04 UIU/ML (ref 0.36–3.74)
VLDLC SERPL CALC-MCNC: 21.8 MG/DL

## 2023-07-31 PROCEDURE — 36415 COLL VENOUS BLD VENIPUNCTURE: CPT

## 2023-07-31 PROCEDURE — 80076 HEPATIC FUNCTION PANEL: CPT

## 2023-07-31 PROCEDURE — 84443 ASSAY THYROID STIM HORMONE: CPT

## 2023-07-31 PROCEDURE — 84480 ASSAY TRIIODOTHYRONINE (T3): CPT

## 2023-07-31 PROCEDURE — 84439 ASSAY OF FREE THYROXINE: CPT

## 2023-07-31 PROCEDURE — 80061 LIPID PANEL: CPT

## 2023-07-31 NOTE — RESULT ENCOUNTER NOTE
Please contact patient and do the following asap  Reduce Synthroid to 88 mcg a day  TSH and free T4 in 2 months

## 2023-08-01 DIAGNOSIS — E05.90 HYPERTHYROIDISM: Primary | ICD-10-CM

## 2023-08-01 LAB — T3 SERPL-MCNC: 90 NG/DL (ref 71–180)

## 2023-08-01 RX ORDER — LEVOTHYROXINE SODIUM 88 UG/1
88 TABLET ORAL DAILY
COMMUNITY
End: 2023-08-01 | Stop reason: SDUPTHER

## 2023-08-01 RX ORDER — LEVOTHYROXINE SODIUM 88 UG/1
88 TABLET ORAL DAILY
Qty: 90 TABLET | Refills: 3 | Status: SHIPPED | OUTPATIENT
Start: 2023-08-01

## 2023-08-01 NOTE — TELEPHONE ENCOUNTER
Requested Prescriptions     Pending Prescriptions Disp Refills    levothyroxine (SYNTHROID) 88 MCG tablet 90 tablet 3     Sig: Take 1 tablet by mouth Daily

## 2023-08-01 NOTE — TELEPHONE ENCOUNTER
Patient made aware of Dr. Brenden Tomlinson message below.      Mira Arriaza MD      Please contact patient and do the following asap   Reduce Synthroid to 88 mcg a day   TSH and free T4 in 2 months

## 2023-08-25 ENCOUNTER — OFFICE VISIT (OUTPATIENT)
Age: 83
End: 2023-08-25

## 2023-08-25 VITALS
OXYGEN SATURATION: 100 % | HEIGHT: 58 IN | BODY MASS INDEX: 27.29 KG/M2 | SYSTOLIC BLOOD PRESSURE: 123 MMHG | DIASTOLIC BLOOD PRESSURE: 72 MMHG | WEIGHT: 130 LBS | HEART RATE: 77 BPM

## 2023-08-25 DIAGNOSIS — E05.90 HYPERTHYROIDISM: ICD-10-CM

## 2023-08-25 DIAGNOSIS — I48.0 PAROXYSMAL ATRIAL FIBRILLATION (HCC): Primary | ICD-10-CM

## 2023-08-25 DIAGNOSIS — I50.32 CHRONIC DIASTOLIC CONGESTIVE HEART FAILURE (HCC): ICD-10-CM

## 2023-08-25 DIAGNOSIS — R07.2 PRECORDIAL PAIN: ICD-10-CM

## 2023-08-25 DIAGNOSIS — I10 ESSENTIAL HYPERTENSION: ICD-10-CM

## 2023-08-25 ASSESSMENT — ENCOUNTER SYMPTOMS
EYES NEGATIVE: 1
RESPIRATORY NEGATIVE: 1
GASTROINTESTINAL NEGATIVE: 1

## 2023-08-25 ASSESSMENT — PATIENT HEALTH QUESTIONNAIRE - PHQ9
2. FEELING DOWN, DEPRESSED OR HOPELESS: 0
DEPRESSION UNABLE TO ASSESS: FUNCTIONAL CAPACITY MOTIVATION LIMITS ACCURACY
SUM OF ALL RESPONSES TO PHQ9 QUESTIONS 1 & 2: 0
1. LITTLE INTEREST OR PLEASURE IN DOING THINGS: 0
SUM OF ALL RESPONSES TO PHQ QUESTIONS 1-9: 0

## 2023-08-25 NOTE — PROGRESS NOTES
1. Have you been to the ER, urgent care clinic since your last visit? Hospitalized since your last visit? No      2. Where do you normally have your labs drawn? [address] [facility]    3. Do you need any refills today? No    4. Which local pharmacy do you use (enter pharmacy)? [facility]    5. Which mail order pharmacy do you use (enter pharmacy)? [facility]     6. Are you here for surgical clearance and if so who will be doing your     procedure/surgery (care team)?    No

## 2023-08-25 NOTE — PROGRESS NOTES
Cherie Workman is a 78y.o. year old female. 5/3/2023 seen as a new patient for history of atrial fibrillation. Cardiology opinion as she changed PCP. Continues to have palpitations every few days. These last for a few minutes. No associated nausea vomiting shortness of breath or diaphoresis. No significant chest pains. No significant cardiac symptoms when she is not having palpitations. Edema intermittently for years. Improves with diuretics. Has been worsening for 2-3 wks. Ambulation is limited due to back pain and leg pains  6/13/2023 work-up reveals normal stress test, enlarged left atrium, hyperthyroidism and elevated lipids. Dyspnea is felt when she does vacuum or mop floors. Edema is persisting.  8/25/2023 work-up revealing hyperthyroidism. Dose of Synthroid reduced on 7/31/2023. Dyspnea is about the same. Edema is improving. Lipids are excellent now. Diffuse tenderness/pain in the left precordium and left shoulder which is positional.        Review of Systems   Constitutional: Negative. HENT: Negative. Eyes: Negative. Respiratory: Negative. Cardiovascular:  Positive for chest pain, palpitations and leg swelling. Gastrointestinal: Negative. Endocrine: Negative. Genitourinary: Negative. Musculoskeletal: Negative. Neurological: Negative. Psychiatric/Behavioral: Negative. All other systems reviewed and are negative. Physical Exam  Vitals and nursing note reviewed. Constitutional:       Appearance: Normal appearance. HENT:      Head: Normocephalic and atraumatic. Nose: Nose normal.   Eyes:      Conjunctiva/sclera: Conjunctivae normal.   Cardiovascular:      Rate and Rhythm: Normal rate and regular rhythm. Pulses: Normal pulses. Heart sounds: Normal heart sounds. Pulmonary:      Effort: Pulmonary effort is normal.      Breath sounds: Normal breath sounds.    Chest:      Chest wall: Tenderness (Reproducible diffuse left precordial)

## 2023-08-30 ENCOUNTER — TELEPHONE (OUTPATIENT)
Age: 83
End: 2023-08-30

## 2023-08-30 NOTE — TELEPHONE ENCOUNTER
Patient PA for Carola Kumar has been from now until 12/31/2099 as long as she has this insurance. Patient and Pharmacy are aware.

## 2023-10-11 DIAGNOSIS — E78.00 HYPERCHOLESTEREMIA: ICD-10-CM

## 2023-10-11 RX ORDER — ATORVASTATIN CALCIUM 10 MG/1
10 TABLET, FILM COATED ORAL DAILY
Qty: 90 TABLET | Refills: 3 | Status: SHIPPED | OUTPATIENT
Start: 2023-10-11

## 2023-10-27 RX ORDER — LEVOTHYROXINE SODIUM 88 UG/1
88 TABLET ORAL DAILY
Qty: 90 TABLET | Refills: 3 | Status: SHIPPED | OUTPATIENT
Start: 2023-10-27

## 2024-01-03 DIAGNOSIS — I50.32 CHRONIC DIASTOLIC CONGESTIVE HEART FAILURE (HCC): ICD-10-CM

## 2024-01-03 NOTE — TELEPHONE ENCOUNTER
Requested Prescriptions     Pending Prescriptions Disp Refills    dapagliflozin (FARXIGA) 10 MG tablet 90 tablet 1     Sig: Take 1 tablet by mouth every morning

## 2024-02-01 ENCOUNTER — OFFICE VISIT (OUTPATIENT)
Age: 84
End: 2024-02-01

## 2024-02-01 VITALS
SYSTOLIC BLOOD PRESSURE: 128 MMHG | DIASTOLIC BLOOD PRESSURE: 68 MMHG | BODY MASS INDEX: 26.87 KG/M2 | WEIGHT: 128 LBS | OXYGEN SATURATION: 98 % | HEART RATE: 75 BPM | HEIGHT: 58 IN

## 2024-02-01 DIAGNOSIS — E78.00 PURE HYPERCHOLESTEROLEMIA: ICD-10-CM

## 2024-02-01 DIAGNOSIS — I10 ESSENTIAL HYPERTENSION: ICD-10-CM

## 2024-02-01 DIAGNOSIS — I50.32 CHRONIC DIASTOLIC CONGESTIVE HEART FAILURE (HCC): ICD-10-CM

## 2024-02-01 DIAGNOSIS — I48.0 PAROXYSMAL ATRIAL FIBRILLATION (HCC): Primary | ICD-10-CM

## 2024-02-01 DIAGNOSIS — E05.90 HYPERTHYROIDISM: ICD-10-CM

## 2024-02-01 ASSESSMENT — PATIENT HEALTH QUESTIONNAIRE - PHQ9
SUM OF ALL RESPONSES TO PHQ QUESTIONS 1-9: 0
SUM OF ALL RESPONSES TO PHQ9 QUESTIONS 1 & 2: 0
2. FEELING DOWN, DEPRESSED OR HOPELESS: 0
SUM OF ALL RESPONSES TO PHQ QUESTIONS 1-9: 0
DEPRESSION UNABLE TO ASSESS: FUNCTIONAL CAPACITY MOTIVATION LIMITS ACCURACY
1. LITTLE INTEREST OR PLEASURE IN DOING THINGS: 0

## 2024-02-01 ASSESSMENT — ENCOUNTER SYMPTOMS
GASTROINTESTINAL NEGATIVE: 1
SHORTNESS OF BREATH: 1
EYES NEGATIVE: 1

## 2024-02-01 NOTE — PROGRESS NOTES
1. Have you been to the ER, urgent care clinic since your last visit?  Hospitalized since your last visit?     No      2.  Where do you normally have your labs drawn?   PCP    3. Do you need any refills today?   No    4. Which local pharmacy do you use (enter pharmacy)?   [facility]    5. Which mail order pharmacy do you use (enter pharmacy)?   [facility]     6. Are you here for surgical clearance and if so who will be doing your     procedure/surgery (care team)?   No      
the last 2 to 3 weeks.  Her ambulation is limited but she is not getting any dyspnea.  Will need a cardiac work-up with echo and stress test as well as an event monitor to check the frequency and duration of A-fib if it is still happening.  Blood pressure is low normal.  Will discontinue diltiazem and watch for any palpitations.  This may help in her edema as well.  Continue anticoagulation.    6/13/2023 work-up reveals normal stress test, enlarged left atrium, hyperthyroidism and elevated lipids.  Atrial fibrillation could be related to hyper thyroidism discussion.  Reduce the dose of Synthroid.  Follow-up labs and refer to endocrinology.  Start statins as LDL is very high.  Reduce diuresis as there is no fluid overload.  No swelling around ankles is likely from synovial thickening.  Reduce valsartan as blood pressure is low normal.  Follow-up home BP chart    8/25/2023  Plan for medicines : Since dyspnea continues, I will try Farxiga for diastolic heart failure.  She will need most likely to reduce metformin.  And will discuss with PCP about it.  I told her to monitor her sugars very carefully.  Palpitations are described less but will follow the thyroid function also and adjust Synthroid dose if need be.  Blood pressure is controlled well.  Chest pain is musculoskeletal and recent stress test was normal.  No further work-up needed for now.  Exercise Plan: Try to stay active as now.    Kaley was seen today for follow-up.    Diagnoses and all orders for this visit:    Paroxysmal atrial fibrillation (HCC)    Essential hypertension  -     EKG 12 Lead    Chronic diastolic congestive heart failure (HCC)  -     Basic Metabolic Panel; Future    Hyperthyroidism  -     TSH + Free T4 Panel; Future    Pure hypercholesterolemia          See patient instructions also for other medical advice given    Medications Discontinued During This Encounter   Medication Reason    vitamin D (CHOLECALCIFEROL) 125 MCG (5000 UT) CAPS

## 2024-03-04 RX ORDER — LEVOTHYROXINE SODIUM 88 UG/1
88 TABLET ORAL DAILY
Qty: 90 TABLET | Refills: 1 | Status: SHIPPED | OUTPATIENT
Start: 2024-03-04

## 2024-03-04 RX ORDER — LEVOTHYROXINE SODIUM 88 UG/1
88 TABLET ORAL DAILY
Qty: 90 TABLET | Refills: 3 | OUTPATIENT
Start: 2024-03-04

## 2024-03-04 NOTE — TELEPHONE ENCOUNTER
This RN contacted the patient at telephone number listed on file. Name and  verified with patient as identifiers.     Per written instruction from Dr. Lizama, the following information was relayed to Pt:  She should get the refill of levothyroxine from PCP as it is not due to any medication that I gave.      Pt advised that PCP stated she should obtain refill from our office as dosage was amended by this office. Pt requesting one time refill.

## 2024-09-23 DIAGNOSIS — I50.32 CHRONIC DIASTOLIC CONGESTIVE HEART FAILURE (HCC): ICD-10-CM

## 2024-09-23 RX ORDER — DAPAGLIFLOZIN 10 MG/1
10 TABLET, FILM COATED ORAL EVERY MORNING
Qty: 90 TABLET | Refills: 3 | Status: SHIPPED | OUTPATIENT
Start: 2024-09-23

## 2024-09-26 ENCOUNTER — OFFICE VISIT (OUTPATIENT)
Age: 84
End: 2024-09-26
Payer: MEDICARE

## 2024-09-26 VITALS
OXYGEN SATURATION: 96 % | HEIGHT: 58 IN | HEART RATE: 102 BPM | DIASTOLIC BLOOD PRESSURE: 69 MMHG | SYSTOLIC BLOOD PRESSURE: 118 MMHG | WEIGHT: 128 LBS | BODY MASS INDEX: 26.87 KG/M2

## 2024-09-26 DIAGNOSIS — E78.00 PURE HYPERCHOLESTEROLEMIA: ICD-10-CM

## 2024-09-26 DIAGNOSIS — I10 ESSENTIAL HYPERTENSION: Primary | ICD-10-CM

## 2024-09-26 DIAGNOSIS — R42 DIZZINESS: ICD-10-CM

## 2024-09-26 DIAGNOSIS — I50.32 CHRONIC DIASTOLIC CONGESTIVE HEART FAILURE (HCC): ICD-10-CM

## 2024-09-26 DIAGNOSIS — I48.0 PAROXYSMAL ATRIAL FIBRILLATION (HCC): ICD-10-CM

## 2024-09-26 PROCEDURE — 99214 OFFICE O/P EST MOD 30 MIN: CPT | Performed by: INTERNAL MEDICINE

## 2024-09-26 PROCEDURE — 1036F TOBACCO NON-USER: CPT | Performed by: INTERNAL MEDICINE

## 2024-09-26 PROCEDURE — G8428 CUR MEDS NOT DOCUMENT: HCPCS | Performed by: INTERNAL MEDICINE

## 2024-09-26 PROCEDURE — 93000 ELECTROCARDIOGRAM COMPLETE: CPT | Performed by: INTERNAL MEDICINE

## 2024-09-26 PROCEDURE — G8419 CALC BMI OUT NRM PARAM NOF/U: HCPCS | Performed by: INTERNAL MEDICINE

## 2024-09-26 PROCEDURE — 1123F ACP DISCUSS/DSCN MKR DOCD: CPT | Performed by: INTERNAL MEDICINE

## 2024-09-26 PROCEDURE — 1090F PRES/ABSN URINE INCON ASSESS: CPT | Performed by: INTERNAL MEDICINE

## 2024-09-26 RX ORDER — VALSARTAN 40 MG/1
40 TABLET ORAL DAILY
Qty: 90 TABLET | Refills: 1 | Status: SHIPPED | OUTPATIENT
Start: 2024-09-26

## 2024-09-26 RX ORDER — FUROSEMIDE 20 MG
20 TABLET ORAL DAILY PRN
Qty: 90 TABLET | Refills: 1 | Status: SHIPPED | OUTPATIENT
Start: 2024-09-26

## 2024-09-26 ASSESSMENT — PATIENT HEALTH QUESTIONNAIRE - PHQ9
2. FEELING DOWN, DEPRESSED OR HOPELESS: NOT AT ALL
SUM OF ALL RESPONSES TO PHQ9 QUESTIONS 1 & 2: 0
SUM OF ALL RESPONSES TO PHQ QUESTIONS 1-9: 0
SUM OF ALL RESPONSES TO PHQ QUESTIONS 1-9: 0
DEPRESSION UNABLE TO ASSESS: FUNCTIONAL CAPACITY MOTIVATION LIMITS ACCURACY
1. LITTLE INTEREST OR PLEASURE IN DOING THINGS: NOT AT ALL
SUM OF ALL RESPONSES TO PHQ QUESTIONS 1-9: 0
SUM OF ALL RESPONSES TO PHQ QUESTIONS 1-9: 0

## 2024-09-26 ASSESSMENT — ENCOUNTER SYMPTOMS
EYES NEGATIVE: 1
SHORTNESS OF BREATH: 1
GASTROINTESTINAL NEGATIVE: 1

## 2024-10-31 DIAGNOSIS — I50.32 CHRONIC DIASTOLIC CONGESTIVE HEART FAILURE (HCC): ICD-10-CM

## 2024-10-31 RX ORDER — DAPAGLIFLOZIN 10 MG/1
10 TABLET, FILM COATED ORAL EVERY MORNING
Qty: 90 TABLET | Refills: 3 | Status: SHIPPED | OUTPATIENT
Start: 2024-10-31

## 2024-12-19 ENCOUNTER — OFFICE VISIT (OUTPATIENT)
Age: 84
End: 2024-12-19
Payer: MEDICARE

## 2024-12-19 VITALS
DIASTOLIC BLOOD PRESSURE: 65 MMHG | HEART RATE: 84 BPM | BODY MASS INDEX: 25.19 KG/M2 | HEIGHT: 58 IN | SYSTOLIC BLOOD PRESSURE: 107 MMHG | WEIGHT: 120 LBS

## 2024-12-19 DIAGNOSIS — I10 ESSENTIAL HYPERTENSION: ICD-10-CM

## 2024-12-19 DIAGNOSIS — I50.32 CHRONIC DIASTOLIC CONGESTIVE HEART FAILURE (HCC): Primary | ICD-10-CM

## 2024-12-19 DIAGNOSIS — I48.20 CHRONIC ATRIAL FIBRILLATION (HCC): ICD-10-CM

## 2024-12-19 DIAGNOSIS — I34.0 NONRHEUMATIC MITRAL VALVE REGURGITATION: ICD-10-CM

## 2024-12-19 DIAGNOSIS — Z01.810 PREOP CARDIOVASCULAR EXAM: ICD-10-CM

## 2024-12-19 DIAGNOSIS — E78.00 PURE HYPERCHOLESTEROLEMIA: ICD-10-CM

## 2024-12-19 PROCEDURE — G8484 FLU IMMUNIZE NO ADMIN: HCPCS | Performed by: INTERNAL MEDICINE

## 2024-12-19 PROCEDURE — 1090F PRES/ABSN URINE INCON ASSESS: CPT | Performed by: INTERNAL MEDICINE

## 2024-12-19 PROCEDURE — 1160F RVW MEDS BY RX/DR IN RCRD: CPT | Performed by: INTERNAL MEDICINE

## 2024-12-19 PROCEDURE — 1159F MED LIST DOCD IN RCRD: CPT | Performed by: INTERNAL MEDICINE

## 2024-12-19 PROCEDURE — G8427 DOCREV CUR MEDS BY ELIG CLIN: HCPCS | Performed by: INTERNAL MEDICINE

## 2024-12-19 PROCEDURE — 1126F AMNT PAIN NOTED NONE PRSNT: CPT | Performed by: INTERNAL MEDICINE

## 2024-12-19 PROCEDURE — 99214 OFFICE O/P EST MOD 30 MIN: CPT | Performed by: INTERNAL MEDICINE

## 2024-12-19 PROCEDURE — G8419 CALC BMI OUT NRM PARAM NOF/U: HCPCS | Performed by: INTERNAL MEDICINE

## 2024-12-19 PROCEDURE — 1123F ACP DISCUSS/DSCN MKR DOCD: CPT | Performed by: INTERNAL MEDICINE

## 2024-12-19 PROCEDURE — 1036F TOBACCO NON-USER: CPT | Performed by: INTERNAL MEDICINE

## 2024-12-19 ASSESSMENT — ENCOUNTER SYMPTOMS
SHORTNESS OF BREATH: 1
EYES NEGATIVE: 1
GASTROINTESTINAL NEGATIVE: 1

## 2024-12-19 ASSESSMENT — PATIENT HEALTH QUESTIONNAIRE - PHQ9
SUM OF ALL RESPONSES TO PHQ QUESTIONS 1-9: 0
SUM OF ALL RESPONSES TO PHQ QUESTIONS 1-9: 0
DEPRESSION UNABLE TO ASSESS: FUNCTIONAL CAPACITY MOTIVATION LIMITS ACCURACY
1. LITTLE INTEREST OR PLEASURE IN DOING THINGS: NOT AT ALL
SUM OF ALL RESPONSES TO PHQ9 QUESTIONS 1 & 2: 0
2. FEELING DOWN, DEPRESSED OR HOPELESS: NOT AT ALL
SUM OF ALL RESPONSES TO PHQ QUESTIONS 1-9: 0
SUM OF ALL RESPONSES TO PHQ QUESTIONS 1-9: 0

## 2024-12-19 NOTE — PROGRESS NOTES
1. Have you been to the ER, urgent care clinic since your last visit?  Hospitalized since your last visit?     No      2.  Where do you normally have your labs drawn?   PCP    3. Do you need any refills today?   No    4. Which local pharmacy do you use (enter pharmacy)?   [facility]    5. Which mail order pharmacy do you use (enter pharmacy)?   [facility]     6. Are you here for surgical clearance and if so who will be doing your     procedure/surgery (care team)?   No

## 2024-12-19 NOTE — PROGRESS NOTES
Kaley Ward is a 84 y.o. year old female.    Follow-up of atrial fibrillation, hypertension, CHF  History of hyperthyroidism    5/3/2023 seen as a new patient for history of atrial fibrillation. Cardiology opinion as she changed PCP.  Continues to have palpitations every few days.  These last for a few minutes.  No associated nausea vomiting shortness of breath or diaphoresis.  No significant chest pains.  No significant cardiac symptoms when she is not having palpitations. Edema intermittently for years. Improves with diuretics. Has been worsening for 2-3 wks. Ambulation is limited due to back pain and leg pains  6/13/2023 work-up reveals normal stress test, enlarged left atrium, hyperthyroidism and elevated lipids.  Dyspnea is felt when she does vacuum or mop floors.  Edema is persisting.  8/25/2023 work-up revealing hyperthyroidism.  Dose of Synthroid reduced on 7/31/2023.  Dyspnea is about the same.  Edema is improving.  Lipids are excellent now.  Diffuse tenderness/pain in the left precordium and left shoulder which is positional.  2/1/2024 palpitations are still persisting happening 2-3 times a week for a couple of minutes.  No associated symptoms.  Continues to have mild edema in the ankles.  Dyspnea persists on vacuuming or mopping the floor.  Feels significantly better since Synthroid dose was reduced.  Chest discomfort continue every few days which are not related to food or activity lasting a couple of minutes as before.  9/26/2024 had 3 falls with dizziness but no injuries fortunately.  Blood pressure low at home at times.  Getting short of breath lately with activities like vacuuming at home.  Denies chest pain.  Has mild puffiness/edema specially in the left lower leg  12/19/2024 no further falls.  No significant dizziness.  Shortness of breath is same or some better.  No chest pain.          Review of Systems   Constitutional: Negative.    HENT: Negative.     Eyes: Negative.    Respiratory:

## 2025-01-06 ENCOUNTER — TELEPHONE (OUTPATIENT)
Age: 85
End: 2025-01-06

## 2025-01-06 NOTE — TELEPHONE ENCOUNTER
----- Message from Shantell CAREY sent at 12/19/2024 12:15 PM EST -----  Regarding: cath and NAMRATA  Please schedule NAMRATA with Dr. EDEL Iraheta as soon as possible also please schedule cardioversion

## 2025-01-06 NOTE — TELEPHONE ENCOUNTER
[facility]      Transesophageal Echocardiogram and Cardioversion Instructions    You are scheduled to have a Transesophageal Echocardiogram and Cardioversion on 1/22/2025 at [facility]. Please arrive and check in at 6:30a.m.     Please go to [facility] ([address] [place] [address]) and park in the outpatient parking lot that is located around to the back of the hospital ([facility]). You will enter through the [facility] entrance. Once you arrive, check in with the  at the [facility]  with the .     You are not to eat or drink anything after midnight the night before your procedure. Small sips of water with medications is OK.    If you are diabetic, do not take your insulin/sugar pill the morning of the procedure.     Medication Instructions:  Please Take your morning medications with the following special instructions:    [] Please make sure to take your blood pressure medications    []Take your Aspirin and or Plavix/Brilinta     [] Hold (DO NOT TAKE) your [Eliquis, Xarelto, Coumadin, and or Metformin] on . Ask your nurse after the procedure when to resume these mediations.     []  If you have an allergy to Iodine, Contrast, or Shellfish: take Prednisone 60 mg and Benadryl 25 mg by mouth at at bed time the night before the procedure and again morning of the procedure.     6.  We encourage families to wait in the waiting room on the first floor while the procedure is being done. The Doctor will come out and talk with you as soon as the procedure is through. You will need someone to drive you home after you have been discharged from the hospital.     7.  There is a possibility you may spend the night in the hospital depending on the results of the procedure. This will be determined after the procedure is done.     8. If you or your family have any questions, please call our office Monday-Friday 8:30am to 4:00pm at

## 2025-01-16 NOTE — FLOWSHEET NOTE
Called to verify arrival time of 0630 for 0730 procedure on 1/22. Pre-procedure instructions verified including NPO after midnight and which meds to take and/or hold. All questions answered, patient's daughter verbalized understanding.

## 2025-01-16 NOTE — PROGRESS NOTES
1/16 1412 Attempted to contact pt w/ pre-procedure info/arrival time, no answer. VM left with instructions to call back and/or access information on [employer]

## 2025-02-05 NOTE — FLOWSHEET NOTE
Called to verify arrival time of 0630 for 0730 procedure on 2/12. Pre-procedure instructions verified including NPO after midnight and which meds to take and/or hold. All questions answered, patient verbalized understanding.

## 2025-02-12 ENCOUNTER — ANESTHESIA (OUTPATIENT)
Facility: HOSPITAL | Age: 85
End: 2025-02-12
Payer: MEDICARE

## 2025-02-12 ENCOUNTER — ANESTHESIA EVENT (OUTPATIENT)
Facility: HOSPITAL | Age: 85
End: 2025-02-12
Payer: MEDICARE

## 2025-02-12 ENCOUNTER — HOSPITAL ENCOUNTER (OUTPATIENT)
Facility: HOSPITAL | Age: 85
Setting detail: OUTPATIENT SURGERY
Discharge: HOME | End: 2025-02-12
Attending: INTERNAL MEDICINE | Admitting: INTERNAL MEDICINE
Payer: MEDICARE

## 2025-02-12 ENCOUNTER — HOSPITAL ENCOUNTER (OUTPATIENT)
Facility: HOSPITAL | Age: 85
Discharge: HOME | End: 2025-02-14
Attending: INTERNAL MEDICINE
Payer: MEDICARE

## 2025-02-12 VITALS
OXYGEN SATURATION: 97 % | HEART RATE: 87 BPM | RESPIRATION RATE: 16 BRPM | SYSTOLIC BLOOD PRESSURE: 129 MMHG | DIASTOLIC BLOOD PRESSURE: 92 MMHG

## 2025-02-12 VITALS — HEIGHT: 58 IN | WEIGHT: 120 LBS | BODY MASS INDEX: 25.19 KG/M2

## 2025-02-12 DIAGNOSIS — I50.32 CHRONIC DIASTOLIC HEART FAILURE (HCC): ICD-10-CM

## 2025-02-12 DIAGNOSIS — I48.92 ATRIAL FLUTTER, UNSPECIFIED TYPE (HCC): ICD-10-CM

## 2025-02-12 DIAGNOSIS — I34.0 MITRAL VALVE INSUFFICIENCY, UNSPECIFIED ETIOLOGY: ICD-10-CM

## 2025-02-12 DIAGNOSIS — I48.20 CHRONIC ATRIAL FIBRILLATION (HCC): ICD-10-CM

## 2025-02-12 LAB
ECHO AO ASC DIAM: 3.3 CM
ECHO AO ASCENDING AORTA INDEX: 2.24 CM/M2
ECHO BSA: 1.49 M2
ECHO MV EROA PISA: 0.3 CM2
ECHO MV REGURGITANT ALIASING (NYQUIST) VELOCITY: 43 CM/S
ECHO MV REGURGITANT RADIUS PISA: 0.68 CM
ECHO MV REGURGITANT VELOCITY PISA: 4.9 M/S
ECHO MV REGURGITANT VOLUME PISA: 32.97 ML
ECHO MV REGURGITANT VTIA: 129.4 CM
ECHO MV VENA CONTRACTA AREA: 0.9 CM2
ECHO MV VENA CONTRACTA: 0.6 CM
EKG ATRIAL RATE: 79 BPM
EKG DIAGNOSIS: NORMAL
EKG P AXIS: 61 DEGREES
EKG P-R INTERVAL: 198 MS
EKG Q-T INTERVAL: 380 MS
EKG QRS DURATION: 82 MS
EKG QTC CALCULATION (BAZETT): 435 MS
EKG R AXIS: 4 DEGREES
EKG T AXIS: 1 DEGREES
EKG VENTRICULAR RATE: 79 BPM

## 2025-02-12 PROCEDURE — 3700000000 HC ANESTHESIA ATTENDED CARE: Performed by: INTERNAL MEDICINE

## 2025-02-12 PROCEDURE — 92960 CARDIOVERSION ELECTRIC EXT: CPT | Performed by: INTERNAL MEDICINE

## 2025-02-12 PROCEDURE — 7100000011 HC PHASE II RECOVERY - ADDTL 15 MIN

## 2025-02-12 PROCEDURE — 92960 CARDIOVERSION ELECTRIC EXT: CPT

## 2025-02-12 PROCEDURE — 93010 ELECTROCARDIOGRAM REPORT: CPT | Performed by: INTERNAL MEDICINE

## 2025-02-12 PROCEDURE — 93312 ECHO TRANSESOPHAGEAL: CPT

## 2025-02-12 PROCEDURE — 6360000002 HC RX W HCPCS: Performed by: NURSE ANESTHETIST, CERTIFIED REGISTERED

## 2025-02-12 PROCEDURE — 3700000001 HC ADD 15 MINUTES (ANESTHESIA): Performed by: INTERNAL MEDICINE

## 2025-02-12 PROCEDURE — 7100000010 HC PHASE II RECOVERY - FIRST 15 MIN: Performed by: INTERNAL MEDICINE

## 2025-02-12 PROCEDURE — 99152 MOD SED SAME PHYS/QHP 5/>YRS: CPT | Performed by: INTERNAL MEDICINE

## 2025-02-12 PROCEDURE — 80047 BASIC METABLC PNL IONIZED CA: CPT

## 2025-02-12 PROCEDURE — 7100000011 HC PHASE II RECOVERY - ADDTL 15 MIN: Performed by: INTERNAL MEDICINE

## 2025-02-12 PROCEDURE — 7100000010 HC PHASE II RECOVERY - FIRST 15 MIN

## 2025-02-12 PROCEDURE — 7100000000 HC PACU RECOVERY - FIRST 15 MIN: Performed by: INTERNAL MEDICINE

## 2025-02-12 PROCEDURE — 2580000003 HC RX 258: Performed by: INTERNAL MEDICINE

## 2025-02-12 PROCEDURE — 93005 ELECTROCARDIOGRAM TRACING: CPT | Performed by: INTERNAL MEDICINE

## 2025-02-12 RX ORDER — ACETAMINOPHEN 325 MG/1
650 TABLET ORAL
Status: DISCONTINUED | OUTPATIENT
Start: 2025-02-12 | End: 2025-02-12 | Stop reason: HOSPADM

## 2025-02-12 RX ORDER — SODIUM CHLORIDE 9 MG/ML
INJECTION, SOLUTION INTRAVENOUS PRN
Status: DISCONTINUED | OUTPATIENT
Start: 2025-02-12 | End: 2025-02-12 | Stop reason: HOSPADM

## 2025-02-12 RX ORDER — SODIUM CHLORIDE 0.9 % (FLUSH) 0.9 %
5-40 SYRINGE (ML) INJECTION PRN
Status: DISCONTINUED | OUTPATIENT
Start: 2025-02-12 | End: 2025-02-12 | Stop reason: HOSPADM

## 2025-02-12 RX ORDER — MEPERIDINE HYDROCHLORIDE 25 MG/ML
12.5 INJECTION INTRAMUSCULAR; INTRAVENOUS; SUBCUTANEOUS EVERY 5 MIN PRN
Status: DISCONTINUED | OUTPATIENT
Start: 2025-02-12 | End: 2025-02-12 | Stop reason: HOSPADM

## 2025-02-12 RX ORDER — ACYCLOVIR 200 MG/1
CAPSULE ORAL
Status: DISCONTINUED
Start: 2025-02-12 | End: 2025-02-12 | Stop reason: HOSPADM

## 2025-02-12 RX ORDER — INSULIN LISPRO 100 [IU]/ML
0-15 INJECTION, SOLUTION INTRAVENOUS; SUBCUTANEOUS ONCE
Status: DISCONTINUED | OUTPATIENT
Start: 2025-02-12 | End: 2025-02-12 | Stop reason: HOSPADM

## 2025-02-12 RX ORDER — SODIUM CHLORIDE 0.9 % (FLUSH) 0.9 %
5-40 SYRINGE (ML) INJECTION EVERY 12 HOURS SCHEDULED
Status: DISCONTINUED | OUTPATIENT
Start: 2025-02-12 | End: 2025-02-12 | Stop reason: HOSPADM

## 2025-02-12 RX ORDER — DEXTROSE MONOHYDRATE 100 MG/ML
INJECTION, SOLUTION INTRAVENOUS CONTINUOUS PRN
Status: DISCONTINUED | OUTPATIENT
Start: 2025-02-12 | End: 2025-02-12 | Stop reason: HOSPADM

## 2025-02-12 RX ORDER — FENTANYL CITRATE 50 UG/ML
25 INJECTION, SOLUTION INTRAMUSCULAR; INTRAVENOUS EVERY 5 MIN PRN
Status: DISCONTINUED | OUTPATIENT
Start: 2025-02-12 | End: 2025-02-12 | Stop reason: HOSPADM

## 2025-02-12 RX ORDER — PROPOFOL 10 MG/ML
INJECTION, EMULSION INTRAVENOUS
Status: DISCONTINUED | OUTPATIENT
Start: 2025-02-12 | End: 2025-02-12 | Stop reason: SDUPTHER

## 2025-02-12 RX ORDER — ONDANSETRON 2 MG/ML
4 INJECTION INTRAMUSCULAR; INTRAVENOUS
Status: DISCONTINUED | OUTPATIENT
Start: 2025-02-12 | End: 2025-02-12 | Stop reason: HOSPADM

## 2025-02-12 RX ORDER — DIPHENHYDRAMINE HYDROCHLORIDE 50 MG/ML
12.5 INJECTION INTRAMUSCULAR; INTRAVENOUS
Status: DISCONTINUED | OUTPATIENT
Start: 2025-02-12 | End: 2025-02-12 | Stop reason: HOSPADM

## 2025-02-12 RX ORDER — NALOXONE HYDROCHLORIDE 0.4 MG/ML
INJECTION, SOLUTION INTRAMUSCULAR; INTRAVENOUS; SUBCUTANEOUS PRN
Status: DISCONTINUED | OUTPATIENT
Start: 2025-02-12 | End: 2025-02-12 | Stop reason: HOSPADM

## 2025-02-12 RX ORDER — LABETALOL HYDROCHLORIDE 5 MG/ML
10 INJECTION, SOLUTION INTRAVENOUS
Status: DISCONTINUED | OUTPATIENT
Start: 2025-02-12 | End: 2025-02-12 | Stop reason: HOSPADM

## 2025-02-12 RX ADMIN — LIDOCAINE HYDROCHLORIDE 40 MG: 20 INJECTION, SOLUTION INFILTRATION; PERINEURAL at 08:32

## 2025-02-12 RX ADMIN — PROPOFOL 10 MG: 10 INJECTION, EMULSION INTRAVENOUS at 08:42

## 2025-02-12 RX ADMIN — PROPOFOL 20 MG: 10 INJECTION, EMULSION INTRAVENOUS at 08:34

## 2025-02-12 RX ADMIN — PROPOFOL 20 MG: 10 INJECTION, EMULSION INTRAVENOUS at 08:46

## 2025-02-12 RX ADMIN — PROPOFOL 10 MG: 10 INJECTION, EMULSION INTRAVENOUS at 08:43

## 2025-02-12 RX ADMIN — SODIUM CHLORIDE: 9 INJECTION, SOLUTION INTRAVENOUS at 08:32

## 2025-02-12 RX ADMIN — PROPOFOL 20 MG: 10 INJECTION, EMULSION INTRAVENOUS at 08:39

## 2025-02-12 RX ADMIN — PROPOFOL 50 MG: 10 INJECTION, EMULSION INTRAVENOUS at 08:32

## 2025-02-12 ASSESSMENT — ENCOUNTER SYMPTOMS: SHORTNESS OF BREATH: 1

## 2025-02-12 NOTE — ANESTHESIA PRE PROCEDURE
Department of Anesthesiology  Preprocedure Note       Name:  Kaley Ward   Age:  84 y.o.  :  1940                                          MRN:  [id number]         Date:  2025      Surgeon: Surgeon(s):  Scott Iraheta MD    Procedure: Procedure(s):  Ep cardioversion    Medications prior to admission:   Prior to Admission medications    Medication Sig Start Date End Date Taking? Authorizing Provider   dapagliflozin (FARXIGA) 10 MG tablet Take 1 tablet by mouth every morning 10/31/24   Peter Thomas APRN - NP   valsartan (DIOVAN) 40 MG tablet Take 1 tablet by mouth daily 24   Riley Lizama MD   furosemide (LASIX) 20 MG tablet Take 1 tablet by mouth daily as needed (Edema) 24   Riley Lizama MD   apixaban (ELIQUIS) 2.5 MG TABS tablet Take 1 tablet by mouth 2 times daily 24   Riley Lizama MD   levothyroxine (SYNTHROID) 88 MCG tablet Take 1 tablet by mouth Daily 3/4/24   Riley Lizama MD   atorvastatin (LIPITOR) 10 MG tablet Take 1 tablet by mouth daily 10/11/23   Peter Thomas APRN - NP   Ascorbic Acid (VITAMIN C) 1000 MG tablet Take 1 tablet by mouth daily    Provider, Historical, MD   Lancets [facility] Precision X-tra. Check 1-2 times a day 16   Automatic Reconciliation, Ar   gabapentin (NEURONTIN) 300 MG capsule Take 1 capsule by mouth 2 times daily. 18   Automatic Reconciliation, Ar   metFORMIN (GLUCOPHAGE) 500 MG tablet Take 1 tablet by mouth 2 times daily (with meals) 18   Automatic Reconciliation, Ar   SITagliptin (JANUVIA) 50 MG tablet Take 1 tablet by mouth daily    Automatic Reconciliation, Ar       Current medications:    No current facility-administered medications for this encounter.     No current outpatient medications on file.     Facility-Administered Medications Ordered in Other Encounters   Medication Dose Route Frequency Provider Last Rate Last Admin   • 0.9 % sodium chloride infusion   IntraVENous PRN Scott Iraheta MD

## 2025-02-12 NOTE — ANESTHESIA POSTPROCEDURE EVALUATION
Department of Anesthesiology  Postprocedure Note    Patient: Kaley Ward  MRN: [id number]  YOB: 1940  Date of evaluation: 2/12/2025    Procedure Summary       Date: 02/12/25 Room / Location: [facility] CATH HOLDING / [facility] CARDIAC CATH LAB    Anesthesia Start: 0723 Anesthesia Stop: 0857    Procedure: Ep cardioversion Diagnosis:       Atrial flutter, unspecified type (HCC)      Chronic diastolic heart failure (HCC)      Mitral valve insufficiency, unspecified etiology      (Atrial flutter, unspecified type (HCC) [I48.92])      (Chronic diastolic heart failure (HCC) [I50.32])      (Mitral valve insufficiency, unspecified etiology [I34.0])    Providers: Scott Iraheta MD Responsible Provider: Daniel Benavides MD    Anesthesia Type: MAC ASA Status: 3            Anesthesia Type: MAC    Petros Phase I:      Petros Phase II:      Anesthesia Post Evaluation    Patient location during evaluation: PACU  Patient participation: complete - patient participated  Level of consciousness: sleepy but conscious  Pain score: 0  Airway patency: patent  Nausea & Vomiting: no nausea and no vomiting  Cardiovascular status: blood pressure returned to baseline  Respiratory status: acceptable  Hydration status: euvolemic  Pain management: adequate    No notable events documented.

## 2025-02-14 ENCOUNTER — TELEPHONE (OUTPATIENT)
Age: 85
End: 2025-02-14

## 2025-02-14 NOTE — TELEPHONE ENCOUNTER
Patient had and Cardioversion and was told she need to have EKG in 30 days , patient not scheduled to see you until/ 4/16. Please advise if she need EKG right a way or she can wait until appointment.

## 2025-02-17 NOTE — TELEPHONE ENCOUNTER
Spoke to patient per Dr. Dupree regarding cardioversion. She had a post EKG already. She voices understanding and acceptance of this advice and will call back if any further questions or concerns.

## 2025-02-18 LAB
ANION GAP BLD CALC-SCNC: 10 (ref 10–20)
CA-I BLD-MCNC: 1.12 MMOL/L (ref 1.15–1.33)
CHLORIDE BLD-SCNC: 108 MMOL/L (ref 100–111)
CO2 BLD-SCNC: 25 MMOL/L (ref 22–29)
CREAT UR-MCNC: 0.8 MG/DL (ref 0.6–1.3)
GLUCOSE BLD STRIP.AUTO-MCNC: 143 MG/DL (ref 74–99)
POTASSIUM BLD-SCNC: 4.5 MMOL/L (ref 3.5–5.5)
SODIUM BLD-SCNC: 143 MMOL/L (ref 136–145)

## 2025-03-07 DIAGNOSIS — I10 ESSENTIAL HYPERTENSION: ICD-10-CM

## 2025-03-07 DIAGNOSIS — I48.0 PAROXYSMAL ATRIAL FIBRILLATION (HCC): ICD-10-CM

## 2025-03-07 RX ORDER — VALSARTAN 40 MG/1
40 TABLET ORAL DAILY
Qty: 90 TABLET | Refills: 3 | Status: SHIPPED | OUTPATIENT
Start: 2025-03-07

## 2025-05-02 ENCOUNTER — OFFICE VISIT (OUTPATIENT)
Age: 85
End: 2025-05-02
Payer: MEDICARE

## 2025-05-02 VITALS
BODY MASS INDEX: 27.08 KG/M2 | SYSTOLIC BLOOD PRESSURE: 105 MMHG | OXYGEN SATURATION: 98 % | DIASTOLIC BLOOD PRESSURE: 58 MMHG | WEIGHT: 129 LBS | HEART RATE: 81 BPM | HEIGHT: 58 IN

## 2025-05-02 DIAGNOSIS — I48.0 PAROXYSMAL ATRIAL FIBRILLATION (HCC): Primary | ICD-10-CM

## 2025-05-02 PROCEDURE — 1090F PRES/ABSN URINE INCON ASSESS: CPT | Performed by: NURSE PRACTITIONER

## 2025-05-02 PROCEDURE — 1123F ACP DISCUSS/DSCN MKR DOCD: CPT | Performed by: NURSE PRACTITIONER

## 2025-05-02 PROCEDURE — G8428 CUR MEDS NOT DOCUMENT: HCPCS | Performed by: NURSE PRACTITIONER

## 2025-05-02 PROCEDURE — G8419 CALC BMI OUT NRM PARAM NOF/U: HCPCS | Performed by: NURSE PRACTITIONER

## 2025-05-02 PROCEDURE — 1126F AMNT PAIN NOTED NONE PRSNT: CPT | Performed by: NURSE PRACTITIONER

## 2025-05-02 PROCEDURE — 99214 OFFICE O/P EST MOD 30 MIN: CPT | Performed by: NURSE PRACTITIONER

## 2025-05-02 PROCEDURE — 93000 ELECTROCARDIOGRAM COMPLETE: CPT | Performed by: NURSE PRACTITIONER

## 2025-05-02 PROCEDURE — 1036F TOBACCO NON-USER: CPT | Performed by: NURSE PRACTITIONER

## 2025-05-02 ASSESSMENT — PATIENT HEALTH QUESTIONNAIRE - PHQ9
SUM OF ALL RESPONSES TO PHQ QUESTIONS 1-9: 0
2. FEELING DOWN, DEPRESSED OR HOPELESS: NOT AT ALL
1. LITTLE INTEREST OR PLEASURE IN DOING THINGS: NOT AT ALL

## 2025-05-02 NOTE — PROGRESS NOTES
1. Have you been to the ER, urgent care clinic since your last visit?  Hospitalized since your last visit?     no    2. Have you seen or consulted any other health care providers outside of the [facility] since your last visit?  Include any pap smears or colon screening.      Yes pcp   
Admits noticing swelling in ankles during the day but resolves at night. Denies chest pain, palpitations, shortness of breath, dizziness.           Review of Systems   Constitutional: Negative.    HENT: Negative.     Eyes: Negative.    Respiratory:  Negative for shortness of breath.    Cardiovascular:  Positive for leg swelling. Negative for chest pain and palpitations.   Gastrointestinal: Negative.    Endocrine: Negative.    Genitourinary: Negative.    Musculoskeletal: Negative.    Neurological: Negative.    Psychiatric/Behavioral: Negative.     All other systems reviewed and are negative.        Physical Exam  Vitals and nursing note reviewed.   Constitutional:       Appearance: Normal appearance.   HENT:      Head: Normocephalic and atraumatic.      Nose: Nose normal.   Eyes:      Conjunctiva/sclera: Conjunctivae normal.   Cardiovascular:      Rate and Rhythm: Normal rate and regular rhythm.      Pulses: Normal pulses.      Heart sounds: Murmur heard.      Harsh early systolic murmur is present with a grade of 3/6 at the upper right sternal border.   Pulmonary:      Effort: Pulmonary effort is normal.      Breath sounds: Normal breath sounds.   Chest:      Chest wall: No tenderness.   Abdominal:      General: Bowel sounds are normal.      Palpations: Abdomen is soft.   Musculoskeletal:         General: Normal range of motion.      Right lower leg: Edema (Puffy but not pitting) present.      Left lower leg: Edema (Puffy but not pitting) present.   Skin:     General: Skin is warm and dry.   Neurological:      General: No focal deficit present.      Mental Status: She is alert and oriented to person, place, and time.   Psychiatric:         Mood and Affect: Mood normal.         Behavior: Behavior normal.        Allergies   Allergen Reactions    Codeine Other (See Comments) and Swelling     Severe swelling      Piroxicam Swelling     Severe swelling of hands, face and feet.       Past Medical History:   Diagnosis Date

## (undated) DEVICE — (D)BNDG ADHESIVE FABRIC 3/4X3 -- DISC BY MFR USE ITEM 357960

## (undated) DEVICE — ELECTRODE ES AD DISPER HYDRGEL THN FOAM ADH SCALLOPED EDGE

## (undated) DEVICE — DRAPE,REIN 53X77,STERILE: Brand: MEDLINE

## (undated) DEVICE — DRAPE TWL SURG 16X26IN BLU ORB04] ALLCARE INC]

## (undated) DEVICE — MIRAGE SWIFT II PILLOW LGE: Brand: MIRAGE SWIFT II

## (undated) DEVICE — TRAY SUPP STD NO DRUG W EXTENSION SET

## (undated) DEVICE — CUFF BLD PRESSURE MONITORING LNG AD 23-33 CM 1 TUBE MY CUF

## (undated) DEVICE — NEEDLE SPNL L3.5IN DIA25GA QNCKE TYP BVL SPINOCAN

## (undated) DEVICE — AVANOS* SHORT BEVEL NEEDLE: Brand: AVANOS